# Patient Record
Sex: MALE | Race: WHITE | NOT HISPANIC OR LATINO | Employment: OTHER | URBAN - METROPOLITAN AREA
[De-identification: names, ages, dates, MRNs, and addresses within clinical notes are randomized per-mention and may not be internally consistent; named-entity substitution may affect disease eponyms.]

---

## 2017-01-09 RX ORDER — BUPROPION HYDROCHLORIDE 150 MG/1
TABLET, EXTENDED RELEASE ORAL
Qty: 180 TABLET | Refills: 1 | Status: SHIPPED | OUTPATIENT
Start: 2017-01-09 | End: 2017-05-15

## 2017-01-16 ENCOUNTER — OFFICE VISIT (OUTPATIENT)
Dept: PAIN MEDICINE | Facility: CLINIC | Age: 67
End: 2017-01-16
Payer: OTHER GOVERNMENT

## 2017-01-16 VITALS
WEIGHT: 189.81 LBS | DIASTOLIC BLOOD PRESSURE: 70 MMHG | RESPIRATION RATE: 18 BRPM | SYSTOLIC BLOOD PRESSURE: 130 MMHG | HEART RATE: 74 BPM | BODY MASS INDEX: 26.47 KG/M2 | TEMPERATURE: 98 F

## 2017-01-16 DIAGNOSIS — M25.561 CHRONIC PAIN OF RIGHT KNEE: ICD-10-CM

## 2017-01-16 DIAGNOSIS — G89.29 CHRONIC RIGHT SHOULDER PAIN: Primary | ICD-10-CM

## 2017-01-16 DIAGNOSIS — M25.511 CHRONIC RIGHT SHOULDER PAIN: Primary | ICD-10-CM

## 2017-01-16 DIAGNOSIS — G89.4 CHRONIC PAIN SYNDROME: ICD-10-CM

## 2017-01-16 DIAGNOSIS — G89.29 CHRONIC PAIN OF RIGHT KNEE: ICD-10-CM

## 2017-01-16 PROCEDURE — 99999 PR PBB SHADOW E&M-EST. PATIENT-LVL IV: CPT | Mod: PBBFAC,,, | Performed by: PHYSICIAN ASSISTANT

## 2017-01-16 PROCEDURE — 99213 OFFICE O/P EST LOW 20 MIN: CPT | Mod: S$PBB,,, | Performed by: PHYSICIAN ASSISTANT

## 2017-01-16 PROCEDURE — 99214 OFFICE O/P EST MOD 30 MIN: CPT | Mod: PBBFAC,PO | Performed by: PHYSICIAN ASSISTANT

## 2017-01-16 RX ORDER — BUPRENORPHINE 20 UG/H
1 PATCH, EXTENDED RELEASE TRANSDERMAL
Qty: 4 PATCH | Refills: 0 | Status: SHIPPED | OUTPATIENT
Start: 2017-01-20 | End: 2017-02-17

## 2017-01-16 RX ORDER — BUPRENORPHINE 20 UG/H
1 PATCH, EXTENDED RELEASE TRANSDERMAL
Qty: 4 PATCH | Refills: 0 | Status: SHIPPED | OUTPATIENT
Start: 2017-03-17 | End: 2017-04-13 | Stop reason: SDUPTHER

## 2017-01-16 RX ORDER — METHYLPREDNISOLONE 4 MG/1
TABLET ORAL
Qty: 1 PACKAGE | Refills: 0 | Status: SHIPPED | OUTPATIENT
Start: 2017-01-16 | End: 2017-02-06

## 2017-01-16 RX ORDER — BUPRENORPHINE 20 UG/H
1 PATCH, EXTENDED RELEASE TRANSDERMAL
Qty: 4 PATCH | Refills: 0 | Status: SHIPPED | OUTPATIENT
Start: 2017-02-17 | End: 2017-03-17

## 2017-01-16 NOTE — PROGRESS NOTES
This note was completed with dictation software and grammatical errors may exist.    CC:Knee pain, shoulder pain    HPI: The patient is a 66-year-old man with history of CAD and stent, hypertension, degenerative joint disease in the right shoulder and knee who presents in referral from Dr. Traore for help with pain.  He is status post right knee geniculate nerve block on 11/4/16 with 0% relief so radiofrequency ablation was not scheduled.  He complains of right shoulder greater than right knee pain.  This is worse with use and improved with pain medication and rest.  He denies any major back pain at this time, only reports low back pain from time to time.  He denies weakness, numbness, bladder or bowel incontinence.  He will be moving back to [place] for his job so this will be his last visit.    Pain intervention history:  He is status post right L3 and L4 transforaminal epidural steroid injections on 3/14/14 with 85% relief at first, now reporting what sounds like about 25% relief.  He is status post right L3 and L4 transforaminal epidural steroid injection on 6/4/14 with 0% relief.  He is status post right knee geniculate nerve block on 11/4/16 with 0% relief so radiofrequency ablation was not scheduled.    Urine drug screen on 1/22/14 was positive for buprenorphine and tramadol but negative for hydrocodone which he was apparently taking.    Repeat drug screen on 2/20/14 again positive for buprenorphine and tramadol but negative for hydrocodone.  5/20/14 and 6/25/14 urine drug screens both consistent.    ROS:He reports fatigability, chest pain at times, easy bruising, joint stiffness, joint swelling and back pain.  Balance of review of systems negative.    Medical, surgical, family and social history reviewed elsewhere in record.    Medications/Allergies: See med card    Vitals:    01/16/17 0746   BP: 130/70   Pulse: 74   Resp: 18   Temp: 98.2 °F (36.8 °C)   TempSrc: Oral   Weight: 86.1 kg (189 lb 13.1 oz)    PainSc:   6   PainLoc: Shoulder         Physical exam:  Gen: A and O x3, pleasant, well-groomed  Skin: No rashes or obvious lesions  HEENT: PERRLA  CVS: Regular rate and rhythm, normal S1 and S2, no murmurs.  Resp: Clear to auscultation bilaterally, no wheezes or rales.  Abdomen: Soft, NT/ND, normal bowel sounds present.  Musculoskeletal:  No antalgic gait.     Neuro:  Lower extremities: 5/5 strength bilaterally  Reflexes: Patellar 2+, Achilles 2+ bilaterally  Sensory:  Intact and symmetrical to light touch and pinprick in L2-S1 dermatomes bilaterally.    Right knee: Mild crepitus on range of motion with pain on passive flexion and extension appeared mild tenderness to superior portion of the joint, lateral portion, no redness or erythema or swelling.  Right shoulder: Range of motion is full but painful with flexion, abduction and internal/external rotation.  Empty can test positive.  Moderate generalized tenderness to palpation to the anterior and lateral shoulder.  Mild tenderness to palpation to the right scapular region.    Imagin11 Xray right knee:   THERE IS SPURRING OF THE SUPERIOR PATELLAR FACET AND NARROWING OF THE PATELLOFEMORAL JOINT SPACE. NO JOINT EFFUSION OR ACUTE OSSEOUS ABNORMALITY IS SEEN.    14 MRI lumbar spine  At the T12-L1 level, no significant disk bulge, central canal stenosis, or neural foraminal stenosis is noted. Mild ligamentous hypertrophy is noted  At the L1-L2 level, no significant disk bulge, central canal stenosis, or neural foraminal stenosis is noted. Mild ligamentous hypertrophy is noted   At the L2-L3 level, minimal disk bulging may be noted one to 2 mm. Facet arthropathy and ligamentous hypertrophy is noted. Minimal bilateral neural foraminal narrowing is noted. The central canal is approximately 11 mm and may be minimally narrowed.  At the L3-L4 level, broad-based disk bulging appears to be present of approximately 3 to 4 mm with an asymmetric component greater  towards the right neuroforamen a 4 mm that may relate to protrusion. Facet arthropathy and ligamentous hypertrophy is noted. Mild central canal stenosis is noted to 9 mm. Mild to moderate right neuroforaminal narrowing appears to present with possible contact of the exiting nerve root on the right. Mild left neural foraminal narrowing is noted.  At the L4-L5 level the disk osteophyte protrusion appears to be present paracentric to the right of approximately 5 mm. Mild to moderate central canal narrowing is noted to 8 mm. Mild to moderate right nerve foraminal narrowing is noted with mild left neuroforaminal narrowing. Contact of the exiting right nerve root may be present. Increased T2 signal is noted suggestive of an annular tear paracentric to the right  At the L5-S1 level, ligamentous hypertrophy is noted in. No significant disk bulge, central canal stenosis, or neural foraminal stenosis is noted.      Assessment:  The patient is a 66-year-old man with history of CAD and stent, hypertension, degenerative joint disease in the right shoulder and knee who presents in referral from Dr. Traore for help with pain.   1. Chronic right shoulder pain     2. Chronic pain of right knee     3. Chronic pain syndrome         Plan:  1.  He did not have relief following the geniculate nerve block so RFA will not be scheduled.  2.  His right shoulder pain is getting worse and he will make an appointment with an orthopedic surgeon in [place].  In the meantime, I have provided a Medrol Dosepak to see if this provides some relief.  3.  He continues to take Flexeril occasionally and currently does not need a prescription.  4.  Dr. Giron provided prescriptions for Butrans 20 mcg/h.  I have reviewed the [place] Board of Pharmacy website and there are no abberancies.    5.  He will not follow up because he is moving to [place] but is welcome to return here if needed.

## 2017-02-03 RX ORDER — ATORVASTATIN CALCIUM 40 MG/1
TABLET, FILM COATED ORAL
Qty: 30 TABLET | Refills: 0 | Status: SHIPPED | OUTPATIENT
Start: 2017-02-03 | End: 2017-05-11 | Stop reason: SDUPTHER

## 2017-02-16 ENCOUNTER — TELEPHONE (OUTPATIENT)
Dept: PAIN MEDICINE | Facility: CLINIC | Age: 67
End: 2017-02-16

## 2017-02-16 NOTE — TELEPHONE ENCOUNTER
----- Message from Arin Ayala sent at 2/15/2017  3:05 PM CST -----  Has a question regarding a refill (no further info)  /call wife  Ale [phone]

## 2017-02-16 NOTE — TELEPHONE ENCOUNTER
Spoke with the wife and patient is in [place] for a month. Refill on pain patches are due on Friday and wanted to find out if she can get a refill today. She will be heading out to see him.    Please advise.

## 2017-03-24 RX ORDER — LISINOPRIL 5 MG/1
TABLET ORAL
Qty: 90 TABLET | Refills: 2 | Status: SHIPPED | OUTPATIENT
Start: 2017-03-24 | End: 2017-12-21 | Stop reason: SDUPTHER

## 2017-04-04 ENCOUNTER — TELEPHONE (OUTPATIENT)
Dept: PAIN MEDICINE | Facility: CLINIC | Age: 67
End: 2017-04-04

## 2017-04-04 NOTE — TELEPHONE ENCOUNTER
"----- Message from Rochelle Farooq sent at 4/4/2017  3:12 PM CDT -----  Contact: pt's wife [phone]  Pt's wife called requesting "Jocelyne" to call her back on her cell # [phone]  Wants to discuss apt for her  with either one Dr Giron or Mr Allen upon his arrival.  Pt's wife was requesting 4/17.    Please call her at [phone]    Thanks  liz    "

## 2017-04-07 RX ORDER — RANOLAZINE 500 MG/1
TABLET, FILM COATED, EXTENDED RELEASE ORAL
Qty: 90 TABLET | Refills: 3 | Status: SHIPPED | OUTPATIENT
Start: 2017-04-07 | End: 2018-04-02 | Stop reason: SDUPTHER

## 2017-04-13 ENCOUNTER — TELEPHONE (OUTPATIENT)
Dept: PAIN MEDICINE | Facility: CLINIC | Age: 67
End: 2017-04-13

## 2017-04-13 RX ORDER — BUPRENORPHINE 20 UG/H
1 PATCH, EXTENDED RELEASE TRANSDERMAL
Qty: 4 PATCH | Refills: 0 | Status: SHIPPED | OUTPATIENT
Start: 2017-05-12 | End: 2017-06-09

## 2017-04-13 RX ORDER — BUPRENORPHINE 20 UG/H
1 PATCH, EXTENDED RELEASE TRANSDERMAL
Qty: 4 PATCH | Refills: 0 | Status: SHIPPED | OUTPATIENT
Start: 2017-04-14 | End: 2017-05-12

## 2017-04-13 RX ORDER — BUPRENORPHINE 20 UG/H
1 PATCH, EXTENDED RELEASE TRANSDERMAL
Qty: 4 PATCH | Refills: 0 | Status: SHIPPED | OUTPATIENT
Start: 2017-06-09 | End: 2017-05-15 | Stop reason: SDUPTHER

## 2017-04-13 RX ORDER — BUPRENORPHINE 20 UG/H
1 PATCH, EXTENDED RELEASE TRANSDERMAL
Qty: 4 PATCH | Refills: 0 | Status: SHIPPED | OUTPATIENT
Start: 2017-04-14 | End: 2017-04-13 | Stop reason: SDUPTHER

## 2017-04-17 ENCOUNTER — TELEPHONE (OUTPATIENT)
Dept: PAIN MEDICINE | Facility: CLINIC | Age: 67
End: 2017-04-17

## 2017-04-17 ENCOUNTER — OFFICE VISIT (OUTPATIENT)
Dept: PAIN MEDICINE | Facility: CLINIC | Age: 67
End: 2017-04-17
Payer: OTHER GOVERNMENT

## 2017-04-17 ENCOUNTER — HOSPITAL ENCOUNTER (OUTPATIENT)
Dept: RADIOLOGY | Facility: HOSPITAL | Age: 67
Discharge: HOME | End: 2017-04-17
Attending: ANESTHESIOLOGY
Payer: OTHER GOVERNMENT

## 2017-04-17 VITALS
BODY MASS INDEX: 26.58 KG/M2 | RESPIRATION RATE: 18 BRPM | WEIGHT: 190.63 LBS | TEMPERATURE: 98 F | HEART RATE: 72 BPM | SYSTOLIC BLOOD PRESSURE: 110 MMHG | DIASTOLIC BLOOD PRESSURE: 70 MMHG

## 2017-04-17 DIAGNOSIS — G89.4 CHRONIC PAIN SYNDROME: Primary | ICD-10-CM

## 2017-04-17 DIAGNOSIS — M25.561 CHRONIC PAIN OF RIGHT KNEE: ICD-10-CM

## 2017-04-17 DIAGNOSIS — G89.29 CHRONIC PAIN OF RIGHT KNEE: ICD-10-CM

## 2017-04-17 DIAGNOSIS — G89.29 CHRONIC RIGHT SHOULDER PAIN: ICD-10-CM

## 2017-04-17 DIAGNOSIS — M25.511 CHRONIC RIGHT SHOULDER PAIN: ICD-10-CM

## 2017-04-17 DIAGNOSIS — M25.511 STERNOCLAVICULAR JOINT PAIN, RIGHT: ICD-10-CM

## 2017-04-17 DIAGNOSIS — Z79.891 OPIOID CONTRACT EXISTS: ICD-10-CM

## 2017-04-17 PROCEDURE — 99999 PR PBB SHADOW E&M-EST. PATIENT-LVL IV: CPT | Mod: PBBFAC,,, | Performed by: PHYSICIAN ASSISTANT

## 2017-04-17 PROCEDURE — 71130 X-RAY STRENOCLAVIC JT 3/>VWS: CPT | Mod: TC,PO

## 2017-04-17 PROCEDURE — 99214 OFFICE O/P EST MOD 30 MIN: CPT | Mod: S$PBB,,, | Performed by: PHYSICIAN ASSISTANT

## 2017-04-17 PROCEDURE — 71130 X-RAY STRENOCLAVIC JT 3/>VWS: CPT | Mod: 26,,, | Performed by: RADIOLOGY

## 2017-04-17 PROCEDURE — 99214 OFFICE O/P EST MOD 30 MIN: CPT | Mod: PBBFAC,PO | Performed by: PHYSICIAN ASSISTANT

## 2017-04-17 NOTE — TELEPHONE ENCOUNTER
Patient came in this morning and on his way out wanted to inquire to see if Dr. Giron had any recommendations on providers in the [place] area that he can contact to take over his case. He has contacted several but is looking for someone with  experience. Please advise.

## 2017-04-18 NOTE — PROGRESS NOTES
This note was completed with dictation software and grammatical errors may exist.    CC:Knee pain, shoulder pain    HPI: The patient is a 66-year-old man with history of CAD and stent, hypertension, degenerative joint disease in the right shoulder and knee who presents in referral from Dr. Traore for help with pain.  He returns in follow-up today with multiple pain complaints.  He has been working in [place] and currently lives there but has not established care with a new pain management physician there to take over his medication.  He continues to have right shoulder and right knee pain, denies any changes to this.  He does have a new pain located in the right sternoclavicular joint.  This is been present for about 6 months and he describes it as searing.  It is a little swollen as compared to the left.    Pain intervention history:  He is status post right L3 and L4 transforaminal epidural steroid injections on 3/14/14 with 85% relief at first, now reporting what sounds like about 25% relief.  He is status post right L3 and L4 transforaminal epidural steroid injection on 6/4/14 with 0% relief.  He is status post right knee geniculate nerve block on 11/4/16 with 0% relief so radiofrequency ablation was not scheduled.    Urine drug screen on 1/22/14 was positive for buprenorphine and tramadol but negative for hydrocodone which he was apparently taking.    Repeat drug screen on 2/20/14 again positive for buprenorphine and tramadol but negative for hydrocodone.  5/20/14 and 6/25/14 urine drug screens both consistent.    ROS:He reports fatigability, chest pain at times, easy bruising, joint stiffness, joint swelling and back pain.  Balance of review of systems negative.    Medical, surgical, family and social history reviewed elsewhere in record.    Medications/Allergies: See med card    Vitals:    04/17/17 0817   BP: 110/70   Pulse: 72   Resp: 18   Temp: 98.2 °F (36.8 °C)   TempSrc: Oral   Weight: 86.5 kg (190 lb 9.6  oz)   PainSc:   4   PainLoc: Shoulder         Physical exam:  Gen: A and O x3, pleasant, well-groomed  Skin: No rashes or obvious lesions  HEENT: PERRLA  CVS: Regular rate and rhythm, normal S1 and S2, no murmurs.  Resp: Clear to auscultation bilaterally, no wheezes or rales.  Abdomen: Soft, NT/ND, normal bowel sounds present.  Musculoskeletal:  No antalgic gait.  Mild swelling of the right sternoclavicular joint with moderate tenderness to palpation.    Neuro:  Lower extremities: 5/5 strength bilaterally  Reflexes: Patellar 2+, Achilles 2+ bilaterally  Sensory:  Intact and symmetrical to light touch and pinprick in L2-S1 dermatomes bilaterally.    Right knee: Mild crepitus on range of motion with pain on passive flexion and extension appeared mild tenderness to superior portion of the joint, lateral portion, no redness or erythema or swelling.  Right shoulder: Range of motion is full but painful with flexion, abduction and internal/external rotation.  Empty can test positive.  Moderate generalized tenderness to palpation to the anterior and lateral shoulder.  Mild tenderness to palpation to the right scapular region.    Imagin11 Xray right knee:   THERE IS SPURRING OF THE SUPERIOR PATELLAR FACET AND NARROWING OF THE PATELLOFEMORAL JOINT SPACE. NO JOINT EFFUSION OR ACUTE OSSEOUS ABNORMALITY IS SEEN.    14 MRI lumbar spine  At the T12-L1 level, no significant disk bulge, central canal stenosis, or neural foraminal stenosis is noted. Mild ligamentous hypertrophy is noted  At the L1-L2 level, no significant disk bulge, central canal stenosis, or neural foraminal stenosis is noted. Mild ligamentous hypertrophy is noted   At the L2-L3 level, minimal disk bulging may be noted one to 2 mm. Facet arthropathy and ligamentous hypertrophy is noted. Minimal bilateral neural foraminal narrowing is noted. The central canal is approximately 11 mm and may be minimally narrowed.  At the L3-L4 level, broad-based disk  bulging appears to be present of approximately 3 to 4 mm with an asymmetric component greater towards the right neuroforamen a 4 mm that may relate to protrusion. Facet arthropathy and ligamentous hypertrophy is noted. Mild central canal stenosis is noted to 9 mm. Mild to moderate right neuroforaminal narrowing appears to present with possible contact of the exiting nerve root on the right. Mild left neural foraminal narrowing is noted.  At the L4-L5 level the disk osteophyte protrusion appears to be present paracentric to the right of approximately 5 mm. Mild to moderate central canal narrowing is noted to 8 mm. Mild to moderate right nerve foraminal narrowing is noted with mild left neuroforaminal narrowing. Contact of the exiting right nerve root may be present. Increased T2 signal is noted suggestive of an annular tear paracentric to the right  At the L5-S1 level, ligamentous hypertrophy is noted in. No significant disk bulge, central canal stenosis, or neural foraminal stenosis is noted.      Assessment:  The patient is a 66-year-old man with history of CAD and stent, hypertension, degenerative joint disease in the right shoulder and knee who presents in referral from Dr. Traore for help with pain.   1. Chronic pain syndrome     2. Sternoclavicular joint pain, right  X-Ray Sternoclavicular Joints Min 3 View   3. Chronic right shoulder pain     4. Chronic pain of right knee     5. Opioid contract exists         Plan:  1.  I have ordered an x-ray of the sternoclavicular joints for further evaluation.  2.  Dr. Giron provided prescriptions for Butrans 20 mcg/h.  I have reviewed the [place] Board of Pharmacy website and there are no abberancies.    3.  We will make a 3 month follow-up but he may establish care with a physician in [place] in the future.

## 2017-05-11 RX ORDER — ATORVASTATIN CALCIUM 40 MG/1
40 TABLET, FILM COATED ORAL DAILY
Qty: 30 TABLET | Refills: 0 | Status: SHIPPED | OUTPATIENT
Start: 2017-05-11 | End: 2017-05-15 | Stop reason: SDUPTHER

## 2017-05-11 NOTE — TELEPHONE ENCOUNTER
----- Message from Gaye Ayala sent at 5/11/2017  2:16 PM CDT -----  Contact: pt   Pt needs a refill on lipitor ,,, [facility], but [facility] need a local prescription sent  to [facility] for a 30 days supply until the mail order come,, please call pt back [phone] or [phone]

## 2017-05-11 NOTE — TELEPHONE ENCOUNTER
wife informed OV needed, working in [place], will be in on the 15th, OV scheduled , wife informed, will you do 30 day supply for now?

## 2017-05-12 ENCOUNTER — TELEPHONE (OUTPATIENT)
Dept: PAIN MEDICINE | Facility: CLINIC | Age: 67
End: 2017-05-12

## 2017-05-12 ENCOUNTER — PATIENT OUTREACH (OUTPATIENT)
Dept: ADMINISTRATIVE | Facility: HOSPITAL | Age: 67
End: 2017-05-12
Payer: OTHER GOVERNMENT

## 2017-05-12 DIAGNOSIS — I10 ESSENTIAL HYPERTENSION: ICD-10-CM

## 2017-05-12 DIAGNOSIS — Z11.59 NEED FOR HEPATITIS C SCREENING TEST: Primary | ICD-10-CM

## 2017-05-12 DIAGNOSIS — E78.5 HYPERLIPIDEMIA WITH TARGET LDL LESS THAN 100: ICD-10-CM

## 2017-05-12 NOTE — TELEPHONE ENCOUNTER
----- Message from Kelle Figueroa sent at 5/11/2017  3:24 PM CDT -----  Wife Ale called asking for a call back regarding the pt/pls call back at [phone]

## 2017-05-12 NOTE — TELEPHONE ENCOUNTER
Patient was told that patient could not get medication filled today. Spoke with pharmacy and they stated patient's medication is ready for . Start date is today. Informed patient's wife of this information.

## 2017-05-15 ENCOUNTER — TELEPHONE (OUTPATIENT)
Dept: PAIN MEDICINE | Facility: CLINIC | Age: 67
End: 2017-05-15

## 2017-05-15 ENCOUNTER — OFFICE VISIT (OUTPATIENT)
Dept: FAMILY MEDICINE | Facility: CLINIC | Age: 67
End: 2017-05-15
Payer: OTHER GOVERNMENT

## 2017-05-15 VITALS
WEIGHT: 185.31 LBS | SYSTOLIC BLOOD PRESSURE: 121 MMHG | HEIGHT: 70 IN | HEART RATE: 74 BPM | BODY MASS INDEX: 26.53 KG/M2 | DIASTOLIC BLOOD PRESSURE: 68 MMHG

## 2017-05-15 DIAGNOSIS — Z00.00 ROUTINE HISTORY AND PHYSICAL EXAMINATION OF ADULT: Primary | ICD-10-CM

## 2017-05-15 DIAGNOSIS — Z11.59 NEED FOR HEPATITIS C SCREENING TEST: ICD-10-CM

## 2017-05-15 DIAGNOSIS — M65.341 TRIGGER RING FINGER OF RIGHT HAND: ICD-10-CM

## 2017-05-15 DIAGNOSIS — F32.5 DEPRESSION, MAJOR, IN REMISSION: ICD-10-CM

## 2017-05-15 DIAGNOSIS — I10 ESSENTIAL HYPERTENSION: ICD-10-CM

## 2017-05-15 DIAGNOSIS — E78.5 HYPERLIPIDEMIA WITH TARGET LDL LESS THAN 100: ICD-10-CM

## 2017-05-15 DIAGNOSIS — I25.10 CAD IN NATIVE ARTERY: ICD-10-CM

## 2017-05-15 PROCEDURE — 99999 PR PBB SHADOW E&M-EST. PATIENT-LVL III: CPT | Mod: PBBFAC,,, | Performed by: FAMILY MEDICINE

## 2017-05-15 PROCEDURE — 99213 OFFICE O/P EST LOW 20 MIN: CPT | Mod: PBBFAC,PO | Performed by: FAMILY MEDICINE

## 2017-05-15 PROCEDURE — 99396 PREV VISIT EST AGE 40-64: CPT | Mod: S$PBB,,, | Performed by: FAMILY MEDICINE

## 2017-05-15 RX ORDER — ATORVASTATIN CALCIUM 40 MG/1
40 TABLET, FILM COATED ORAL DAILY
Qty: 90 TABLET | Refills: 3 | Status: SHIPPED | OUTPATIENT
Start: 2017-05-15 | End: 2018-05-11 | Stop reason: SDUPTHER

## 2017-05-15 RX ORDER — BUPROPION HYDROCHLORIDE 300 MG/1
300 TABLET ORAL DAILY
Qty: 90 TABLET | Refills: 3 | Status: SHIPPED | OUTPATIENT
Start: 2017-05-15 | End: 2018-04-23 | Stop reason: SDUPTHER

## 2017-05-15 NOTE — MR AVS SNAPSHOT
[facility]  [id number] [address] [facility] [address]  Phone: [phone]  Fax: [phone]                  Luigi Murillo   5/15/2017 11:20 AM   Office Visit    Description:  Male : 1950   Provider:  Sharath Traore MD   Department:  [facility]           Reason for Visit     Annual Exam           Diagnoses this Visit        Comments    Routine history and physical examination of adult    -  Primary     Essential hypertension         Hyperlipidemia with target LDL less than 100         CAD in native artery         Trigger ring finger of right hand         Need for hepatitis C screening test         Depression, major, in remission                To Do List           Future Appointments        Provider Department Dept Phone    2017 11:05 AM LABORATORY, [place] [facility] [phone]    2017 8:00 AM Vaughn Boyd MD [facility] [phone]    2017 3:00 PM Marcus Evans OD [facility] - Ophthalmology [phone]    2017 8:00 AM [facility]ST[facility] [place] - Pain Management [phone]      Goals (5 Years of Data)     None       These Medications        Disp Refills Start End    atorvastatin (LIPITOR) 40 MG tablet 90 tablet 3 5/15/2017     Take 1 tablet (40 mg total) by mouth once daily. - Oral    Ph[facility]acy: [facility] - [address] Ph #: [phone]       buPROPion (WELLBUTRIN XL) 300 MG 24 hr tablet 90 tablet 3 5/15/2017     Take 1 tablet (300 mg total) by mouth once daily. - Oral    Pharmacy: [facility] - [address] Ph #: [phone]         [facility]     [place] On Call Nurse Care Line - 24/ Assistance  Unless otherwise directed by your provider, please contact [facility], our nurse care line that is available for 24/7 assistance.     Registered nurses in the [facility]  provide: appointment scheduling, clinical advisement, health education, and other advisory services.  Call: [phone] (toll free)               Medications           Message regarding Medications     Verify the changes and/or additions to your medication regime listed below are the same as discussed with your clinician today.  If any of these changes or additions are incorrect, please notify your healthcare provider.        START taking these NEW medications        Refills    buPROPion (WELLBUTRIN XL) 300 MG 24 hr tablet 3    Sig: Take 1 tablet (300 mg total) by mouth once daily.    Class: Normal    Route: Oral      STOP taking these medications     buPROPion (WELLBUTRIN SR) 150 MG TBSR 12 hr tablet TAKE 1 TABLET TWICE A DAY           Verify that the below list of medications is an accurate representation of the medications you are currently taking.  If none reported, the list may be blank. If incorrect, please contact your healthcare provider. Carry this list with you in case of emergency.           Current Medications     aspirin (ECOTRIN) 81 MG EC tablet Take 81 mg by mouth once daily.     atorvastatin (LIPITOR) 40 MG tablet Take 1 tablet (40 mg total) by mouth once daily.    BUTRANS 20 mcg/hour PTWK Apply 1 patch topically every 7 days.    cyclobenzaprine (FLEXERIL) 10 MG tablet Take 1 tablet (10 mg total) by mouth nightly as needed for Muscle spasms.    diphenhydrAMINE (BENADRYL) 25 mg capsule Take 25 mg by mouth every 6 (six) hours as needed for Itching.    lisinopril (PRINIVIL,ZESTRIL) 5 MG tablet TAKE 1 TABLET DAILY    metoprolol succinate (TOPROL-XL) 25 MG 24 hr tablet Take 1 tablet (25 mg total) by mouth once daily.    naproxen (NAPROSYN) 500 MG tablet Take 1 tablet (500 mg total) by mouth 2 (two) times daily as needed.    RANEXA 500 mg Tb12 TAKE 1 TABLET NIGHTLY    tamsulosin (FLOMAX) 0.4 mg Cp24 TAKE 1 CAPSULE EVERY EVENING    buPROPion (WELLBUTRIN XL) 300 MG 24 hr tablet Take 1 tablet (300 mg  "total) by mouth once daily.    nitroGLYCERIN (NITROSTAT) 0.4 MG SL tablet Place 1 tablet (0.4 mg total) under the tongue every 5 (five) minutes as needed for Chest pain. Don't take more than 3 doses.           Clinical Reference Information           Your Vitals Were     BP Pulse Height Weight BMI    121/68 74 5' 10" (1.778 m) 84.1 kg (185 lb 4.8 oz) 26.59 kg/m2      Blood Pressure          Most Recent Value    BP  121/68      Allergies as of 5/15/2017     Acetaminophen-codeine    Codeine    Latex, Natural Rubber      Immunizations Administered on Date of Encounter - 5/15/2017     None      Orders Placed During Today's Visit      Normal Orders This Visit    Ambulatory referral to Orthopedics     Case request GI: COLONOSCOPY     Future Labs/Procedures Expected by Expires    Comprehensive metabolic panel  5/15/2017 5/15/2018    Hepatitis C antibody  5/15/2017 7/14/2018    Lipid panel  5/15/2017 (Approximate) 5/15/2018    PSA, Screening  5/15/2017 5/15/2018      Language Assistance Services     ATTENTION: Language assistance services are available, free of charge. Please call [phone].      ATENCIÓN: Si habla español, tiene a torres disposición servicios gratuitos de asistencia lingüística. Llame al [phone].     FAUZIA Ý: N?u b?n nói Ti?ng Vi?t, có các d?ch v? h? tr? ngôn ng? mi?n phí dành cho b?n. G?i s? [phone].         [facility] complies with applicable Federal civil rights laws and does not discriminate on the basis of race, color, national origin, age, disability, or sex.        "

## 2017-05-15 NOTE — TELEPHONE ENCOUNTER
----- Message from Jocelyne Bach sent at 5/12/2017 11:13 AM CDT -----  Contact: wife Ale [phone]  Please call her regarding the Butrans Patches.  He needs to change it one day prior to when the pharmacy will fill it.  She called in yesterday with no response.  Thank you!

## 2017-05-16 NOTE — PROGRESS NOTES
Patient presents for physical exam and follow-up medical problems as below to include coronary disease hypertension hyperlipidemia..  States compliance with medication.  No complaint of side effects.  He does complain of triggering of the right ring finger present for couple months.  Due for colonoscopy.  No chest pain or shortness of breath.  Depression controlled.  Would like to continue medication    Past Medical History:  Past Medical History:   Diagnosis Date    Anticoagulant long-term use     ASA 81mg, effient    BPH (benign prostatic hypertrophy) with urinary obstruction     CAD (coronary artery disease)     2 stents    Chronic pain     DDD (degenerative disc disease), lumbar     Depression     DJD (degenerative joint disease) of knee     Hyperlipidemia LDL goal < 100     Hypertension     MRSA cellulitis     S/P coronary artery stent placement May and Yarely 2011    x2     Past Surgical History:   Procedure Laterality Date    CORONARY ANGIOPLASTY      CORONARY STENT PLACEMENT      x 2    Epidural steroid injection      Pain management    INJURY      KNEE SURGERY      Right, x2    MANDIBLE FRACTURE SURGERY      accident during  service    ROTATOR CUFF REPAIR      right times 2    VASECTOMY       Social History     Social History    Marital status:      Spouse name: N/A    Number of children: N/A    Years of education: N/A     Occupational History    Not on file.     Social History Main Topics    Smoking status: Former Smoker     Packs/day: 1.00     Years: 25.00     Quit date: 5/11/2007    Smokeless tobacco: Never Used    Alcohol use No    Drug use: Yes     Special: Hydrocodone    Sexual activity: Not on file     Other Topics Concern    Not on file     Social History Narrative     Family History   Problem Relation Age of Onset    Heart attack Father     Heart disease Mother     Emphysema Mother     Cancer Mother      unknown    Prostate cancer Brother     Diabetes  Paternal Aunt      Review of patient's allergies indicates:   Allergen Reactions    Acetaminophen-codeine Rash     #3    Codeine Nausea Only    Latex, natural rubber Rash     Current Outpatient Prescriptions on File Prior to Visit   Medication Sig Dispense Refill    aspirin (ECOTRIN) 81 MG EC tablet Take 81 mg by mouth once daily.       BUTRANS 20 mcg/hour PTWK Apply 1 patch topically every 7 days. 4 patch 0    cyclobenzaprine (FLEXERIL) 10 MG tablet Take 1 tablet (10 mg total) by mouth nightly as needed for Muscle spasms. 90 tablet 2    diphenhydrAMINE (BENADRYL) 25 mg capsule Take 25 mg by mouth every 6 (six) hours as needed for Itching.      lisinopril (PRINIVIL,ZESTRIL) 5 MG tablet TAKE 1 TABLET DAILY 90 tablet 2    metoprolol succinate (TOPROL-XL) 25 MG 24 hr tablet Take 1 tablet (25 mg total) by mouth once daily. (Patient taking differently: Take 25 mg by mouth once daily. 12.5 mg) 90 tablet 3    naproxen (NAPROSYN) 500 MG tablet Take 1 tablet (500 mg total) by mouth 2 (two) times daily as needed. 30 tablet 0    RANEXA 500 mg Tb12 TAKE 1 TABLET NIGHTLY 90 tablet 3    tamsulosin (FLOMAX) 0.4 mg Cp24 TAKE 1 CAPSULE EVERY EVENING 90 capsule 3    [DISCONTINUED] atorvastatin (LIPITOR) 40 MG tablet Take 1 tablet (40 mg total) by mouth once daily. 30 tablet 0    [DISCONTINUED] buPROPion (WELLBUTRIN SR) 150 MG TBSR 12 hr tablet TAKE 1 TABLET TWICE A  tablet 1    nitroGLYCERIN (NITROSTAT) 0.4 MG SL tablet Place 1 tablet (0.4 mg total) under the tongue every 5 (five) minutes as needed for Chest pain. Don't take more than 3 doses. 25 tablet 6    [DISCONTINUED] BUTRANS 20 mcg/hour PTWK Apply 1 patch topically every 7 days. 4 patch 0     No current facility-administered medications on file prior to visit.              ROS:  GENERAL: No fever, chills,  or significant weight changes.  HEENT: No headache, vision or hearing complaints.  No dysphagia  CHEST: Denies ESCAMILLA, cyanosis, wheezing, cough and  "sputum production.  CARDIOVASCULAR: Denies chest pain, PND, orthopnea or reduced exercise tolerance.  ABDOMEN: Appetite fine. Denies diarrhea, abdominal pain, hematemesis or blood in stool.  URINARY: No flank pain, dysuria or hematuria.  MUSCULOSKELETAL: No warmth swelling or tenderness of the joints  NEUROLOGIC: No focal weakness numbness or paresthesia  PSYCHIATRIC: Denies depression      OBJECTIVE:   Vitals:    05/15/17 1137   BP: 121/68   Pulse: 74   Weight: 84.1 kg (185 lb 4.8 oz)   Height: 5' 10" (1.778 m)       Wt Readings from Last 3 Encounters:   05/15/17 84.1 kg (185 lb 4.8 oz)   04/17/17 86.5 kg (190 lb 9.6 oz)   01/16/17 86.1 kg (189 lb 13.1 oz)       APPEARANCE: Well nourished, well developed, in no acute distress.    HEAD: Normocephalic.  Atraumatic.  No sinus tenderness.  EYES:   Right eye: Pupil reactive.  Conjunctiva clear.    Left eye: Pupil reactive.  Conjunctiva clear.    Both fundi:  Grossly normal to nondilated exam. EOMI.    EARS: TM's intact. Light reflex normal. No retraction or perforation.    NOSE:  clear.  MOUTH & THROAT:  No pharyngeal erythema or exudate. No lesions.  NECK: Supple. No bruits.  No JVD.  No cervical lymphadenopathy.  No thyromegaly.    CHEST: Breath sounds clear bilaterally.  Normal respiratory effort  CARDIOVASCULAR: Normal rate.  Regular rhythm.  No murmurs.  No rub.  No gallops.  ABDOMEN: Bowel sounds normal.  Soft.  No tenderness.  No organomegaly.  PERIPHERAL VASCULAR: No cyanosis.  No clubbing.  No edema.  NEUROLOGIC: No focal findings.  MENTAL STATUS: Alert.  Oriented x 3.      Luigi was seen today for annual exam.    Diagnoses and all orders for this visit:    Routine history and physical examination of adult  -     Comprehensive metabolic panel; Future  -     Lipid panel; Future  -     PSA, Screening; Future  -     Case request GI: COLONOSCOPY    Essential hypertension  -     Comprehensive metabolic panel; Future    Hyperlipidemia with target LDL less than " 100  -     Lipid panel; Future    CAD in native artery    Trigger ring finger of right hand  -     Ambulatory referral to Orthopedics    Need for hepatitis C screening test  -     Hepatitis C antibody; Future    Depression, major, in remission    Other orders  -     atorvastatin (LIPITOR) 40 MG tablet; Take 1 tablet (40 mg total) by mouth once daily.  -     buPROPion (WELLBUTRIN XL) 300 MG 24 hr tablet; Take 1 tablet (300 mg total) by mouth once daily.        Continue current medication with refills as above.   Anticipatory guidance: Don't smoke.  Healthy diet and regular exercise recommended.

## 2017-05-29 ENCOUNTER — LAB VISIT (OUTPATIENT)
Dept: LAB | Facility: HOSPITAL | Age: 67
End: 2017-05-29
Attending: FAMILY MEDICINE
Payer: OTHER GOVERNMENT

## 2017-05-29 DIAGNOSIS — E78.5 HYPERLIPIDEMIA WITH TARGET LDL LESS THAN 100: ICD-10-CM

## 2017-05-29 DIAGNOSIS — I10 ESSENTIAL HYPERTENSION: ICD-10-CM

## 2017-05-29 DIAGNOSIS — Z00.00 ROUTINE HISTORY AND PHYSICAL EXAMINATION OF ADULT: ICD-10-CM

## 2017-05-29 DIAGNOSIS — Z11.59 NEED FOR HEPATITIS C SCREENING TEST: ICD-10-CM

## 2017-05-29 LAB
ALBUMIN SERPL BCP-MCNC: 3.8 G/DL
ALP SERPL-CCNC: 81 U/L
ALT SERPL W/O P-5'-P-CCNC: 14 U/L
ANION GAP SERPL CALC-SCNC: 7 MMOL/L
AST SERPL-CCNC: 16 U/L
BILIRUB SERPL-MCNC: 0.9 MG/DL
BUN SERPL-MCNC: 14 MG/DL
CALCIUM SERPL-MCNC: 9.2 MG/DL
CHLORIDE SERPL-SCNC: 105 MMOL/L
CHOLEST/HDLC SERPL: 3 {RATIO}
CO2 SERPL-SCNC: 27 MMOL/L
COMPLEXED PSA SERPL-MCNC: 0.62 NG/ML
CREAT SERPL-MCNC: 1.4 MG/DL
EST. GFR  (AFRICAN AMERICAN): >60 ML/MIN/1.73 M^2
EST. GFR  (NON AFRICAN AMERICAN): 52.7 ML/MIN/1.73 M^2
GLUCOSE SERPL-MCNC: 98 MG/DL
HDL/CHOLESTEROL RATIO: 33.1 %
HDLC SERPL-MCNC: 151 MG/DL
HDLC SERPL-MCNC: 50 MG/DL
LDLC SERPL CALC-MCNC: 84 MG/DL
NONHDLC SERPL-MCNC: 101 MG/DL
POTASSIUM SERPL-SCNC: 4.7 MMOL/L
PROT SERPL-MCNC: 7 G/DL
SODIUM SERPL-SCNC: 139 MMOL/L
TRIGL SERPL-MCNC: 85 MG/DL

## 2017-05-29 PROCEDURE — 36415 COLL VENOUS BLD VENIPUNCTURE: CPT | Mod: PO

## 2017-05-29 PROCEDURE — 80061 LIPID PANEL: CPT

## 2017-05-29 PROCEDURE — 80053 COMPREHEN METABOLIC PANEL: CPT

## 2017-05-29 PROCEDURE — 86803 HEPATITIS C AB TEST: CPT

## 2017-05-29 PROCEDURE — 84153 ASSAY OF PSA TOTAL: CPT

## 2017-05-30 LAB — HCV AB SERPL QL IA: NEGATIVE

## 2017-06-22 DIAGNOSIS — M79.646 PAIN OF FINGER, UNSPECIFIED LATERALITY: Primary | ICD-10-CM

## 2017-07-07 ENCOUNTER — TELEPHONE (OUTPATIENT)
Dept: FAMILY MEDICINE | Facility: CLINIC | Age: 67
End: 2017-07-07

## 2017-07-07 NOTE — TELEPHONE ENCOUNTER
This patient had an appointment with orthopedic dr. Vaughn pulido. He rescheduled with Dr Roberto Gonzales for 7/17/17. Do I need to put in a new referral for Dr. Gonzales?

## 2017-07-14 DIAGNOSIS — M79.641 PAIN OF RIGHT HAND: Primary | ICD-10-CM

## 2017-07-17 ENCOUNTER — OFFICE VISIT (OUTPATIENT)
Dept: PAIN MEDICINE | Facility: CLINIC | Age: 67
End: 2017-07-17
Payer: OTHER GOVERNMENT

## 2017-07-17 ENCOUNTER — HOSPITAL ENCOUNTER (OUTPATIENT)
Dept: RADIOLOGY | Facility: HOSPITAL | Age: 67
Discharge: HOME | End: 2017-07-17
Attending: ORTHOPAEDIC SURGERY
Payer: OTHER GOVERNMENT

## 2017-07-17 ENCOUNTER — OFFICE VISIT (OUTPATIENT)
Dept: ORTHOPEDICS | Facility: CLINIC | Age: 67
End: 2017-07-17
Payer: OTHER GOVERNMENT

## 2017-07-17 VITALS
SYSTOLIC BLOOD PRESSURE: 130 MMHG | BODY MASS INDEX: 26.9 KG/M2 | HEART RATE: 78 BPM | TEMPERATURE: 98 F | WEIGHT: 187.5 LBS | DIASTOLIC BLOOD PRESSURE: 70 MMHG

## 2017-07-17 VITALS — HEART RATE: 75 BPM | RESPIRATION RATE: 12 BRPM | DIASTOLIC BLOOD PRESSURE: 82 MMHG | SYSTOLIC BLOOD PRESSURE: 133 MMHG

## 2017-07-17 DIAGNOSIS — G89.29 CHRONIC PAIN OF RIGHT KNEE: ICD-10-CM

## 2017-07-17 DIAGNOSIS — G89.29 CHRONIC RIGHT SHOULDER PAIN: Primary | ICD-10-CM

## 2017-07-17 DIAGNOSIS — M79.641 PAIN OF RIGHT HAND: ICD-10-CM

## 2017-07-17 DIAGNOSIS — Z79.891 OPIOID CONTRACT EXISTS: ICD-10-CM

## 2017-07-17 DIAGNOSIS — M25.561 CHRONIC PAIN OF RIGHT KNEE: ICD-10-CM

## 2017-07-17 DIAGNOSIS — R25.3 TWITCHING: ICD-10-CM

## 2017-07-17 DIAGNOSIS — M65.341 TRIGGER FINGER, RIGHT RING FINGER: Primary | ICD-10-CM

## 2017-07-17 DIAGNOSIS — M25.511 STERNOCLAVICULAR JOINT PAIN, RIGHT: ICD-10-CM

## 2017-07-17 DIAGNOSIS — M79.18 MYOFASCIAL PAIN: ICD-10-CM

## 2017-07-17 DIAGNOSIS — M25.511 CHRONIC RIGHT SHOULDER PAIN: Primary | ICD-10-CM

## 2017-07-17 PROCEDURE — 73130 X-RAY EXAM OF HAND: CPT | Mod: TC,RT

## 2017-07-17 PROCEDURE — 99215 OFFICE O/P EST HI 40 MIN: CPT | Mod: PBBFAC,PO | Performed by: PHYSICIAN ASSISTANT

## 2017-07-17 PROCEDURE — 99213 OFFICE O/P EST LOW 20 MIN: CPT | Mod: PBBFAC,25,27 | Performed by: ORTHOPAEDIC SURGERY

## 2017-07-17 PROCEDURE — 99999 PR PBB SHADOW E&M-EST. PATIENT-LVL V: CPT | Mod: PBBFAC,,, | Performed by: PHYSICIAN ASSISTANT

## 2017-07-17 PROCEDURE — 99999 PR PBB SHADOW E&M-EST. PATIENT-LVL III: CPT | Mod: PBBFAC,,, | Performed by: ORTHOPAEDIC SURGERY

## 2017-07-17 PROCEDURE — 99204 OFFICE O/P NEW MOD 45 MIN: CPT | Mod: S$PBB,,, | Performed by: ORTHOPAEDIC SURGERY

## 2017-07-17 PROCEDURE — 73130 X-RAY EXAM OF HAND: CPT | Mod: 26,RT,, | Performed by: RADIOLOGY

## 2017-07-17 PROCEDURE — 99214 OFFICE O/P EST MOD 30 MIN: CPT | Mod: S$PBB,,, | Performed by: PHYSICIAN ASSISTANT

## 2017-07-17 RX ORDER — MELOXICAM 15 MG/1
15 TABLET ORAL DAILY
Qty: 30 TABLET | Refills: 2 | Status: SHIPPED | OUTPATIENT
Start: 2017-07-17 | End: 2017-08-21

## 2017-07-17 RX ORDER — BUPRENORPHINE 20 UG/H
1 PATCH, EXTENDED RELEASE TRANSDERMAL
Qty: 4 PATCH | Refills: 0 | Status: SHIPPED | OUTPATIENT
Start: 2017-09-27 | End: 2017-10-23 | Stop reason: SDUPTHER

## 2017-07-17 RX ORDER — BUPRENORPHINE 20 UG/H
1 PATCH, EXTENDED RELEASE TRANSDERMAL
Qty: 4 PATCH | Refills: 0 | Status: ON HOLD | OUTPATIENT
Start: 2017-08-30 | End: 2017-09-14 | Stop reason: HOSPADM

## 2017-07-17 RX ORDER — BUPRENORPHINE 20 UG/H
1 PATCH, EXTENDED RELEASE TRANSDERMAL
Qty: 4 PATCH | Refills: 0 | Status: SHIPPED | OUTPATIENT
Start: 2017-08-02 | End: 2017-08-30

## 2017-07-17 NOTE — PROGRESS NOTES
Subjective:      Patient ID: Luigi Murillo is a 66 y.o. male.    Chief Complaint: Finger Pain of the Right Hand    HPI: The patient presents with classic triggering of his right ring finger.  The patient is right-hand dominant.  He denies any injury.  He has been having progressively worsening and painful symptoms over the last several months.    Review of Systems   Constitution: Negative for chills, decreased appetite, weight gain and weight loss.   HENT: Negative for congestion, ear pain, hearing loss and sore throat.    Eyes: Negative for blurred vision, double vision, vision loss in left eye, vision loss in right eye and visual disturbance.   Cardiovascular: Negative for chest pain, irregular heartbeat, leg swelling and palpitations.   Respiratory: Negative for cough and shortness of breath.    Endocrine: Negative for cold intolerance and heat intolerance.   Hematologic/Lymphatic: Negative for adenopathy. Does not bruise/bleed easily.   Skin: Negative for color change, nail changes, rash and suspicious lesions.   Musculoskeletal:        Positive triggering of right ring finger   Gastrointestinal: Negative for abdominal pain, constipation, diarrhea, heartburn, nausea and vomiting.   Genitourinary: Negative for bladder incontinence and dysuria.   Neurological: Negative for dizziness, paresthesias, sensory change and tremors.   Psychiatric/Behavioral: Negative for altered mental status, depression, memory loss and substance abuse.   Allergic/Immunologic: Negative for hives and persistent infections.         Objective:            General    Nursing note and vitals reviewed.  Constitutional: He is oriented to person, place, and time. He appears well-developed and well-nourished.   HENT:   Head: Normocephalic.   Nose: Nose normal.   Eyes: EOM are normal. Pupils are equal, round, and reactive to light.   Neck: Normal range of motion. Neck supple.   Cardiovascular: Normal rate and regular rhythm.    Pulmonary/Chest:  Effort normal.   Abdominal: Soft. He exhibits no distension. There is no tenderness.   Neurological: He is alert and oriented to person, place, and time.   Psychiatric: He has a normal mood and affect. His behavior is normal.     General Musculoskeletal Exam   Gait: normal   Pelvic Obliquity: none    Right Ankle/Foot Exam   Right ankle exam is normal.    Range of Motion   The patient has normal right ankle ROM.    Alignment   Forefoot Alignment: normal    Muscle Strength   The patient has normal right ankle strength.    Tests   Anterior drawer: negative  Varus tilt: negative  Heel Walk: able to perform  Tiptoe Walk: able to perform    Other   Sensation: normal  Peroneal Subluxation: negative    Left Ankle/Foot Exam   Left ankle exam is normal.    Range of Motion   The patient has normal left ankle ROM.     Alignment   Forefoot Alignment: normal    Muscle Strength   The patient has normal left ankle strength.    Tests   Anterior drawer: negative  Varus tilt: negative  Heel Walk: able to perform  Tiptoe Walk: able to perform    Other   Sensation: normal  Peroneal Subluxation: negative    Right Knee Exam   Right knee exam is normal.    Inspection   Erythema: absent  Swelling: absent  Effusion: effusion  Deformity: deformity  Bruising: absent    Range of Motion   The patient has normal right knee ROM.    Tests   Meniscus   Goran:  Medial - negative Lateral - negative  Ligament Examination Lachman: normal (-1 to 2mm) PCL-Posterior Drawer: normal (0 to 2mm)     MCL - Valgus: normal (0 to 2mm)  LCL - Varus: normalPivot Shift: normal (Equal)  Posterolateral Corner: unstable (>15 degrees difference)  Patella   Patellar Apprehension: negative  Passive Patellar Tilt: neutral  Patellar Tracking: normal  Patellar Grind: negative    Other   Sensation: normal    Left Knee Exam   Left knee exam is normal.    Inspection   Erythema: absent  Swelling: absent  Effusion: absent  Deformity: deformity  Bruising: absent    Range of  Motion   The patient has normal left knee ROM.    Tests   Meniscus   Goran:  Medial - negative Lateral - negative  Stability Lachman: normal (-1 to 2mm) PCL-Posterior Drawer: normal (0 to 2mm)  MCL - Valgus: normal (0 to 2mm)  LCL - Varus: normal (0 to 2mm)Pivot Shift: normal (Equal)  Posterolateral Corner: unstable (>15 degrees difference)  Patella   Patellar Apprehension: negative  Passive Patellar Tilt: neutral  Patellar Tracking: normal  Patellar Grind: negative    Other   Sensation: normal    Right Hip Exam   Right hip exam is normal.     Inspection   Swelling: absent  Bruising: absent    Muscle Strength   The patient has normal right hip strength.    Other   Sensation: normal  Left Hip Exam   Left hip exam is normal.    Inspection   Swelling: absent  Bruising: absent    Range of Motion   The patient has normal left hip ROM.    Muscle Strength   The patient has normal left hip strength.     Other   Sensation: normal      Back (L-Spine & T-Spine) / Neck (C-Spine) Exam   Back exam is normal.    Neck (C-Spine) Range of Motion   Flexion:     Normal  Extension: Normal  Right Lateral Bend: normal  Left Lateral Bend: normal  Right Rotation: normal  Left Rotation: normal    Spinal Sensation   Right Side Sensation  C-Spine Level: normal   L-Spine Level: normal  S-Spine Level: normal  T-Spine Level: normal  Left Side Sensation  C-Spine Level: normal  L-Spine Level: normal  S-Spine Level: normal  T-Spine Level: normal    Other He has no scoliosis .  Head Tilt:  Negative      Right Hand/Wrist Exam     Inspection   Deformity: Wrist - deformity     Pain   Hand - The patient exhibits pain of the ring MCP.    Swelling   Hand - The patient is swollen on the ring MCP.    Tenderness   The patient is tender to palpation of the watson area.    Range of Motion   The patient has normal right hand/wrist ROM.    Tests   Phalens Sign: negative  Tinels Sign (Medial Nerve): negative  Finkelstein: negative  Cubital Tunnel Compression  Test: negative      Other     Neuorologic Exam    Median Distribution: normal  Ulnar Distribution: normal  Radial Distribution: normal    Comments:  Positive triggering of right ring finger, with pain over the A1 pulley      Left Hand/Wrist Exam   Left hand exam is normal.    Inspection   Deformity: Wrist - absent     Range of Motion   The patient has normal left hand/wrist ROM.    Tests   Phalens Sign: negative  Tinels Sign (Medial Nerve): negative  Finkelstein: negative  Cubital Tunnel Compression Test: negative      Other     Sensory Exam  Median Distribution: normal  Ulnar Distribution: normal  Radial Distribution: normal      Right Elbow Exam   Right elbow exam is normal.    Inspection   Effusion: absent  Bruising: absent  Deformity: absent    Range of Motion   The patient has normal right elbow ROM.    Tests Tinel's Sign (cubital tunnel): negative    Other   Sensation: normal      Left Elbow Exam   Left elbow exam is normal.    Inspection   Effusion: absent  Bruising: absent  Deformity: absent    Range of Motion   The patient has normal left elbow ROM.    Tests Tinel's Sign (cubital tunnel): negative    Other   Sensation: normal    Right Shoulder Exam   Right shoulder exam is normal.    Tenderness   The patient is tender to palpation of the greater tuberosity.    Range of Motion   Active Abduction: normal   Passive Abduction: normal   Extension: normal   Forward Flexion: normal   Forward Elevation: normal  Adduction: normal  External Rotation 0 degrees: normal   Internal Rotation 0 degrees: normal     Muscle Strength   The patient has normal right shoulder strength.    Tests & Signs   Apprehension: negative  Cross Arm: negative  Impingement: negative    Other   Sensation: normal    Left Shoulder Exam   Left shoulder exam is normal.    Range of Motion   Active Abduction: normal   Passive Abduction: normal   Extension: normal   Forward Flexion: normal   Forward Elevation: normal  Adduction: normal  External  Rotation 0 degrees: normal   Internal Rotation 0 degrees: normal     Muscle Strength   The patient has normal left shoulder strength.    Tests & Signs   Apprehension: negative  Cross Arm: negative  Impingement: negative    Other   Sensation: normal       Muscle Strength   Right Upper Extremity   Wrist Extension:    Wrist Flexion:    :    Elbow Pronation:     Elbow Supination:     Elbow Extension:   Elbow Flexion:   Intrinsics:   Left Upper Extremity  Wrist Extension:    Wrist Flexion:    :     Elbow Pronation:     Elbow Supination:     Elbow Extension:   Elbow Flexion:   Intrinsics:   Right Lower Extremity   Hip Abduction:    Quadriceps:     Hamstrin/5   Left Lower Extremity   Hip Abduction:    Quadriceps:     Hamstrin/5     Reflexes     Left Side  Biceps:  2+  Triceps:  2+  Quadriceps:  2+  Achilles:  2+    Right Side   Biceps:  2+  Triceps:  2+  Quadriceps:  2+  Achilles:  2+    Vascular Exam     Right Pulses  Dorsalis Pedis:      2+  Posterior Tibial:      2+  Radial:                    2+      Left Pulses  Dorsalis Pedis:      2+  Posterior Tibial:      2+  Radial:                    2+      Capillary Refill  Right Hand: normal capillary refill  Left Hand: normal capillary refill                X-RAY: Hand x-rays are negative for acute fracture or bony destructive lesion.    Assessment:           Encounter Diagnosis   Name Primary?    Trigger finger, right ring finger Yes          Plan:     The patient is not a particularly good candidate for a local steroid injection due to his essentially locked triggering of the right ring finger.  He has been scheduled for an outpatient trigger finger release on 2017.  The patient understands the material risks of surgery, what is involved, and desires to proceed.

## 2017-07-17 NOTE — PROGRESS NOTES
This note was completed with dictation software and grammatical errors may exist.    CC:Knee pain, shoulder pain    HPI: The patient is a 66-year-old man with history of CAD and stent, hypertension, degenerative joint disease in the right shoulder and knee who presents in referral from Dr. Traore for help with pain.  He returns in follow-up today with multiple pain complaints.  He denies any changes to his chronic right knee or right shoulder pain but has a newer pain in the right neck.  He feels that this is connected to his right shoulder.  He also continues to have the pain that he mentioned to me during last visit and has right sternoclavicular joint.  He also feels that when his right shoulder bothers him more, this does as well.  He is going to see an orthopedic surgeon in [place] for right fourth digit trigger finger.  He also has another complaint of a random twitching.  He is not able to pinpoint a pattern of when this happens but it is starting to concern him.  He states this may have been going on for several months now.  He denies any major back pain at this time.    Pain intervention history:  He is status post right L3 and L4 transforaminal epidural steroid injections on 3/14/14 with 85% relief at first, now reporting what sounds like about 25% relief.  He is status post right L3 and L4 transforaminal epidural steroid injection on 6/4/14 with 0% relief.  He is status post right knee geniculate nerve block on 11/4/16 with 0% relief so radiofrequency ablation was not scheduled.    Urine drug screen on 1/22/14 was positive for buprenorphine and tramadol but negative for hydrocodone which he was apparently taking.    Repeat drug screen on 2/20/14 again positive for buprenorphine and tramadol but negative for hydrocodone.  5/20/14 and 6/25/14 urine drug screens both consistent.    ROS:He reports fatigability, chest pain at times, easy bruising, joint stiffness, joint swelling and back pain.  Balance of  review of systems negative.    Medical, surgical, family and social history reviewed elsewhere in record.    Medications/Allergies: See med card    Vitals:    17 0752   BP: 130/70   Pulse: 78   Temp: 98.2 °F (36.8 °C)   TempSrc: Oral   Weight: 85 kg (187 lb 8 oz)   PainSc:   4   PainLoc: Back         Physical exam:  Gen: A and O x3, pleasant, well-groomed  Skin: No rashes or obvious lesions  HEENT: PERRLA  CVS: Regular rate and rhythm, normal S1 and S2, no murmurs.  Resp: Clear to auscultation bilaterally, no wheezes or rales.  Abdomen: Soft, NT/ND, normal bowel sounds present.  Musculoskeletal:  No antalgic gait.  Mild swelling of the right sternoclavicular joint with moderate tenderness to palpation.    Neuro:  Upper extremities: 5/5 strength bilaterally   Lower extremities: 5/5 strength bilaterally  Reflexes: Brachioradialis 2+, Bicep 2+, Tricep 2+. Patellar 2+, Achilles 2+ bilaterally  Sensory: Intact and symmetrical to light touch and pinprick in C2-T1 dermatomes bilaterally.  Intact and symmetrical to light touch and pinprick in L2-S1 dermatomes bilaterally.    Cervical Spine:  Cervical spine: ROM is full in flexion, extension and lateral rotation without increased pain.  Spurling's maneuver causes no neck pain to either side.  Myofascial exam: Mild tenderness to palpation to the right cervical paraspinous muscles and right trapezius muscle.    Right knee: Mild crepitus on range of motion with pain on passive flexion and extension appeared mild tenderness to superior portion of the joint, lateral portion, no redness or erythema or swelling.  Right shoulder: Range of motion is full but painful with flexion, abduction and internal/external rotation.  Empty can test positive.  Moderate generalized tenderness to palpation to the anterior and lateral shoulder.  Mild tenderness to palpation to the right scapular region.    Imagin11 Xray right knee:   THERE IS SPURRING OF THE SUPERIOR PATELLAR FACET  AND NARROWING OF THE PATELLOFEMORAL JOINT SPACE. NO JOINT EFFUSION OR ACUTE OSSEOUS ABNORMALITY IS SEEN.    1/22/14 MRI lumbar spine  At the T12-L1 level, no significant disk bulge, central canal stenosis, or neural foraminal stenosis is noted. Mild ligamentous hypertrophy is noted  At the L1-L2 level, no significant disk bulge, central canal stenosis, or neural foraminal stenosis is noted. Mild ligamentous hypertrophy is noted   At the L2-L3 level, minimal disk bulging may be noted one to 2 mm. Facet arthropathy and ligamentous hypertrophy is noted. Minimal bilateral neural foraminal narrowing is noted. The central canal is approximately 11 mm and may be minimally narrowed.  At the L3-L4 level, broad-based disk bulging appears to be present of approximately 3 to 4 mm with an asymmetric component greater towards the right neuroforamen a 4 mm that may relate to protrusion. Facet arthropathy and ligamentous hypertrophy is noted. Mild central canal stenosis is noted to 9 mm. Mild to moderate right neuroforaminal narrowing appears to present with possible contact of the exiting nerve root on the right. Mild left neural foraminal narrowing is noted.  At the L4-L5 level the disk osteophyte protrusion appears to be present paracentric to the right of approximately 5 mm. Mild to moderate central canal narrowing is noted to 8 mm. Mild to moderate right nerve foraminal narrowing is noted with mild left neuroforaminal narrowing. Contact of the exiting right nerve root may be present. Increased T2 signal is noted suggestive of an annular tear paracentric to the right  At the L5-S1 level, ligamentous hypertrophy is noted in. No significant disk bulge, central canal stenosis, or neural foraminal stenosis is noted.      Assessment:  The patient is a 66-year-old man with history of CAD and stent, hypertension, degenerative joint disease in the right shoulder and knee who presents in referral from Dr. Traore for help with pain.   1.  Chronic right shoulder pain  Ambulatory Referral to Physical Medicine Rehab   2. Sternoclavicular joint pain, right  Ambulatory Referral to Physical Medicine Rehab   3. Myofascial pain  Ambulatory Referral to Physical Medicine Rehab   4. Twitching  Ambulatory consult to Neurology   5. Chronic pain of right knee     6. Opioid contract exists         Plan:  1.  Dr. Giron provided prescriptions for Butrans 20 µg per hour.  I have reviewed the [place] Board of Pharmacy website and there are no abberancies.    2.  I discussed his right shoulder, right neck and right sternoclavicular joint symptoms with him.  His right neck pain may be facet mediated but he feels like it is connected to his shoulder.  I will have him see Dr. Faust for further evaluation.  Consideration can be made for cervical medial branch blocks in the future if necessary but I would obtain cervical spine x-rays first.  3.  In regards to the twitching that he is describing, I have placed a referral to neurology for further evaluation.  4.  Follow-up in 3 months or sooner as needed.

## 2017-07-17 NOTE — LETTER
July 17, 2017      Sharath Traore MD  [address] [facility] [address]           [facility]  [address] [place] [id number]  Phone: [phone]  Fax: [phone]          Patient: Luigi Murillo   MR Number: [id number]   YOB: 1950   Date of Visit: 7/17/2017       Dear Dr. Sharath Traore:    Thank you for referring Luigi Murillo to me for evaluation. Attached you will find relevant portions of my assessment and plan of care.    If you have questions, please do not hesitate to call me. I look forward to following Luigi Murillo along with you.    Sincerely,    Roberto Gonzales MD    Enclosure  CC:  No Recipients    If you would like to receive this communication electronically, please contact [email or web address] or [phone] to request more information on [employer] Link access.    For providers and/or their staff who would like to refer a patient to [facility], please contact us through our one-stop-shop provider referral line, [facility], at [phone].    If you feel you have received this communication in error or would no longer like to receive these types of communications, please e-mail [email or web address]

## 2017-07-25 DIAGNOSIS — M65.341 TRIGGER RING FINGER OF RIGHT HAND: Primary | ICD-10-CM

## 2017-07-25 DIAGNOSIS — Z01.818 PREOP TESTING: Primary | ICD-10-CM

## 2017-08-07 ENCOUNTER — CLINICAL SUPPORT (OUTPATIENT)
Dept: CARDIOLOGY | Facility: CLINIC | Age: 67
End: 2017-08-07
Payer: OTHER GOVERNMENT

## 2017-08-07 ENCOUNTER — HOSPITAL ENCOUNTER (OUTPATIENT)
Dept: RADIOLOGY | Facility: HOSPITAL | Age: 67
Discharge: HOME | End: 2017-08-07
Attending: ORTHOPAEDIC SURGERY
Payer: OTHER GOVERNMENT

## 2017-08-07 DIAGNOSIS — Z01.818 PREOP TESTING: ICD-10-CM

## 2017-08-07 PROCEDURE — 71020 XR CHEST PA AND LATERAL PRE-OP: CPT | Mod: 26,,, | Performed by: RADIOLOGY

## 2017-08-07 PROCEDURE — 93005 ELECTROCARDIOGRAM TRACING: CPT | Mod: PBBFAC | Performed by: NUCLEAR MEDICINE

## 2017-08-07 PROCEDURE — 71020 XR CHEST PA AND LATERAL PRE-OP: CPT | Mod: TC

## 2017-08-07 PROCEDURE — 93010 ELECTROCARDIOGRAM REPORT: CPT | Mod: S$PBB,,, | Performed by: NUCLEAR MEDICINE

## 2017-08-09 ENCOUNTER — TELEPHONE (OUTPATIENT)
Dept: FAMILY MEDICINE | Facility: CLINIC | Age: 67
End: 2017-08-09

## 2017-08-09 NOTE — TELEPHONE ENCOUNTER
Per wife, has appt Monday for surgery clearance, wants to know why he needs to come in as you saw him 5/15/17 and did the surgery referral, please advise.

## 2017-08-09 NOTE — TELEPHONE ENCOUNTER
If this is for the orthopedic surgery for the trigger finger that we had looked at before, then Unless something has changed I probably don't need to see him and he is clear for surgery.    If he is having any kind of chest pain or shortness of breath  we would need to see him.  We would also need to see him if his surgeon or anesthesiologist requires us to see him before the surgery .

## 2017-08-09 NOTE — TELEPHONE ENCOUNTER
----- Message from Gaye Ayala sent at 8/9/2017  1:24 PM CDT -----  Contact: pt wife Ale  Pt calling and wants to know why he has to come to the appt on Monday,,, please call pt wife back at [phone]

## 2017-08-21 ENCOUNTER — OFFICE VISIT (OUTPATIENT)
Dept: FAMILY MEDICINE | Facility: CLINIC | Age: 67
End: 2017-08-21
Payer: OTHER GOVERNMENT

## 2017-08-21 VITALS
SYSTOLIC BLOOD PRESSURE: 121 MMHG | TEMPERATURE: 98 F | HEART RATE: 88 BPM | WEIGHT: 182 LBS | BODY MASS INDEX: 25.48 KG/M2 | HEIGHT: 71 IN | DIASTOLIC BLOOD PRESSURE: 79 MMHG

## 2017-08-21 DIAGNOSIS — M65.341 TRIGGER FINGER, RIGHT RING FINGER: ICD-10-CM

## 2017-08-21 DIAGNOSIS — E78.5 HYPERLIPIDEMIA WITH TARGET LDL LESS THAN 100: ICD-10-CM

## 2017-08-21 DIAGNOSIS — N13.8 BENIGN PROSTATIC HYPERPLASIA WITH URINARY OBSTRUCTION: ICD-10-CM

## 2017-08-21 DIAGNOSIS — N40.1 BENIGN PROSTATIC HYPERPLASIA WITH URINARY OBSTRUCTION: ICD-10-CM

## 2017-08-21 DIAGNOSIS — G89.4 CHRONIC PAIN SYNDROME: ICD-10-CM

## 2017-08-21 DIAGNOSIS — F32.5 DEPRESSION, MAJOR, IN REMISSION: ICD-10-CM

## 2017-08-21 DIAGNOSIS — I10 ESSENTIAL HYPERTENSION: ICD-10-CM

## 2017-08-21 DIAGNOSIS — Z01.818 PRE-OPERATIVE EXAMINATION: Primary | ICD-10-CM

## 2017-08-21 DIAGNOSIS — I25.10 CAD IN NATIVE ARTERY: ICD-10-CM

## 2017-08-21 DIAGNOSIS — I20.9 AP (ANGINA PECTORIS): ICD-10-CM

## 2017-08-21 PROCEDURE — 99999 PR PBB SHADOW E&M-EST. PATIENT-LVL III: CPT | Mod: PBBFAC,,, | Performed by: FAMILY MEDICINE

## 2017-08-21 PROCEDURE — 99244 OFF/OP CNSLTJ NEW/EST MOD 40: CPT | Mod: S$PBB,,, | Performed by: FAMILY MEDICINE

## 2017-08-21 PROCEDURE — 99213 OFFICE O/P EST LOW 20 MIN: CPT | Mod: PBBFAC,PO | Performed by: FAMILY MEDICINE

## 2017-08-21 RX ORDER — METOPROLOL SUCCINATE 25 MG/1
12.5 TABLET, EXTENDED RELEASE ORAL DAILY
Qty: 45 TABLET | Refills: 3 | Status: SHIPPED | OUTPATIENT
Start: 2017-08-21 | End: 2017-12-21 | Stop reason: SDUPTHER

## 2017-08-21 NOTE — Clinical Note
Hilton, Patient is not clear for surgery due to some recent chest pains.  We'll have him see cardiology for clearance. Thanks

## 2017-08-21 NOTE — PROGRESS NOTES
Patient presents for preoperative clearance exam for trigger finger release to be performed by Dr. Gonzales in September.  Medical problems as per below past medical history to include coronary disease, hypertension, hyperlipidemia.  He has actually had some intermittent chest pains during the past several months with some increased symptoms last night.  Symptoms seem to be both with exertion or without exertion.  Does get some cramping sensation at the left lower chest.  The longest episode was yesterday at 30 minutes.  He has had some symptoms similar to prior to previous coronary stents.  He has not seen cardiology in over 2 years.    Past Medical History:  Past Medical History:   Diagnosis Date    Anticoagulant long-term use     ASA 81mg, effient    BPH (benign prostatic hypertrophy) with urinary obstruction     CAD (coronary artery disease)     2 stents    Chronic pain     DDD (degenerative disc disease), lumbar     Depression     DJD (degenerative joint disease) of knee     Hyperlipidemia LDL goal < 100     Hypertension     MRSA cellulitis     S/P coronary artery stent placement May and Yarely 2011    x2     Past Surgical History:   Procedure Laterality Date    CORONARY ANGIOPLASTY      CORONARY STENT PLACEMENT      x 2    Epidural steroid injection      Pain management    INJURY      KNEE SURGERY      Right, x2    MANDIBLE FRACTURE SURGERY      accident during  service    ROTATOR CUFF REPAIR      right times 2    VASECTOMY       Social History     Social History    Marital status:      Spouse name: N/A    Number of children: N/A    Years of education: N/A     Occupational History    Not on file.     Social History Main Topics    Smoking status: Former Smoker     Packs/day: 1.00     Years: 25.00     Quit date: 5/11/2007    Smokeless tobacco: Never Used    Alcohol use No    Drug use:      Types: Hydrocodone    Sexual activity: Not on file     Other Topics Concern    Not on  file     Social History Narrative    No narrative on file     Family History   Problem Relation Age of Onset    Heart attack Father     Heart disease Mother     Emphysema Mother     Cancer Mother      unknown    Prostate cancer Brother     Diabetes Paternal Aunt      Review of patient's allergies indicates:   Allergen Reactions    Acetaminophen-codeine Rash     #3    Codeine Nausea Only    Latex, natural rubber Rash     Current Outpatient Prescriptions on File Prior to Visit   Medication Sig Dispense Refill    aspirin (ECOTRIN) 81 MG EC tablet Take 81 mg by mouth once daily.       atorvastatin (LIPITOR) 40 MG tablet Take 1 tablet (40 mg total) by mouth once daily. 90 tablet 3    buPROPion (WELLBUTRIN XL) 300 MG 24 hr tablet Take 1 tablet (300 mg total) by mouth once daily. 90 tablet 3    BUTRANS 20 mcg/hour PTWK Apply 1 patch topically every 7 days. 4 patch 0    [START ON 8/30/2017] BUTRANS 20 mcg/hour PTWK Apply 1 patch topically every 7 days. 4 patch 0    [START ON 9/27/2017] BUTRANS 20 mcg/hour PTWK Apply 1 patch topically every 7 days. 4 patch 0    cyclobenzaprine (FLEXERIL) 10 MG tablet Take 1 tablet (10 mg total) by mouth nightly as needed for Muscle spasms. 90 tablet 2    lisinopril (PRINIVIL,ZESTRIL) 5 MG tablet TAKE 1 TABLET DAILY 90 tablet 2    RANEXA 500 mg Tb12 TAKE 1 TABLET NIGHTLY 90 tablet 3    tamsulosin (FLOMAX) 0.4 mg Cp24 TAKE 1 CAPSULE EVERY EVENING 90 capsule 3    [DISCONTINUED] metoprolol succinate (TOPROL-XL) 25 MG 24 hr tablet Take 1 tablet (25 mg total) by mouth once daily. (Patient taking differently: Take 25 mg by mouth once daily. 12.5 mg) 90 tablet 3    [DISCONTINUED] diphenhydrAMINE (BENADRYL) 25 mg capsule Take 25 mg by mouth every 6 (six) hours as needed for Itching.      [DISCONTINUED] meloxicam (MOBIC) 15 MG tablet Take 1 tablet (15 mg total) by mouth once daily. 30 tablet 2    [DISCONTINUED] nitroGLYCERIN (NITROSTAT) 0.4 MG SL tablet Place 1 tablet (0.4 mg  "total) under the tongue every 5 (five) minutes as needed for Chest pain. Don't take more than 3 doses. 25 tablet 6     No current facility-administered medications on file prior to visit.            ROS:  GENERAL: No fever, chills,  or significant weight changes.   CARDIOVASCULAR: Positive chest pain as above  ABDOMEN: Appetite fine. Denies diarrhea, abdominal pain, hematemesis or blood in stool.  URINARY: No flank pain, dysuria or hematuria.      OBJECTIVE:     Vitals:    08/21/17 0805   BP: 121/79   Pulse: 88   Temp: 98.2 °F (36.8 °C)   Weight: 82.6 kg (182 lb)   Height: 5' 11" (1.803 m)     Wt Readings from Last 3 Encounters:   08/21/17 82.6 kg (182 lb)   07/17/17 85 kg (187 lb 8 oz)   05/15/17 84.1 kg (185 lb 4.8 oz)     APPEARANCE: Well nourished, well developed, in no acute distress.    HEAD: Normocephalic.  Atraumatic.  No sinus tenderness.  EYES:   Right eye: Pupil reactive.  Conjunctiva clear.    Left eye: Pupil reactive.  Conjunctiva clear.    Both fundi:  Grossly normal to nondilated exam. EOMI.    EARS: TM's intact. Light reflex normal. No retraction or perforation.    NOSE:  clear.  MOUTH & THROAT:  No pharyngeal erythema or exudate. No lesions.  NECK: Supple. No bruits.  No JVD.  No cervical lymphadenopathy.  No thyromegaly.    CHEST: Breath sounds clear bilaterally.  Normal respiratory effort  CARDIOVASCULAR: Normal rate.  Regular rhythm.  No murmurs.  No rub.  No gallops.  ABDOMEN: Bowel sounds normal.  Soft.  No tenderness.  No organomegaly.  PERIPHERAL VASCULAR: No cyanosis.  No clubbing.  No edema.  NEUROLOGIC: No focal findings.  MENTAL STATUS: Alert.  Oriented x 3.    Reviewed recent laboratory and EKG.    Luigi was seen today for post-op problem, chest pain and cramping.    Diagnoses and all orders for this visit:    Pre-operative examination    Trigger finger, right ring finger    CAD in native artery  -     Ambulatory referral to Cardiology  -     metoprolol succinate (TOPROL-XL) 25 MG 24 hr " tablet; Take 0.5 tablets (12.5 mg total) by mouth once daily.    AP (angina pectoris)  -     Ambulatory referral to Cardiology    Essential hypertension    Hyperlipidemia with target LDL less than 100    Chronic pain syndrome    Depression, major, in remission    Benign prostatic hyperplasia with urinary obstruction        Due to the patient's recent angina type symptoms he is NOT clear for planned surgery.  We'll have him see cardiology for clearance.  ER precautions if persistent prolonged symptoms.

## 2017-08-22 ENCOUNTER — TELEPHONE (OUTPATIENT)
Dept: FAMILY MEDICINE | Facility: CLINIC | Age: 67
End: 2017-08-22

## 2017-08-22 NOTE — TELEPHONE ENCOUNTER
Wife reports has appt with Dr Joyce, in [place], on 8/25/17.  Referral sent to preservice to authorize

## 2017-08-22 NOTE — TELEPHONE ENCOUNTER
----- Message from Gaye Ayala sent at 8/22/2017  1:27 PM CDT -----  Contact: pt wife  Pt wife calling about the referral to cardiologist,, please call back [phone]

## 2017-08-25 ENCOUNTER — OFFICE VISIT (OUTPATIENT)
Dept: CARDIOLOGY | Facility: CLINIC | Age: 67
End: 2017-08-25
Payer: OTHER GOVERNMENT

## 2017-08-25 VITALS
HEIGHT: 70 IN | BODY MASS INDEX: 26.67 KG/M2 | WEIGHT: 186.31 LBS | SYSTOLIC BLOOD PRESSURE: 137 MMHG | HEART RATE: 88 BPM | DIASTOLIC BLOOD PRESSURE: 82 MMHG

## 2017-08-25 DIAGNOSIS — I10 ESSENTIAL HYPERTENSION: ICD-10-CM

## 2017-08-25 DIAGNOSIS — E78.5 HYPERLIPIDEMIA WITH TARGET LDL LESS THAN 100: ICD-10-CM

## 2017-08-25 DIAGNOSIS — Z95.5 S/P CORONARY ARTERY STENT PLACEMENT: Primary | ICD-10-CM

## 2017-08-25 DIAGNOSIS — Z01.810 PREOP CARDIOVASCULAR EXAM: ICD-10-CM

## 2017-08-25 DIAGNOSIS — Z95.5 STATUS POST CORONARY ARTERY STENT PLACEMENT: ICD-10-CM

## 2017-08-25 PROCEDURE — 3079F DIAST BP 80-89 MM HG: CPT | Mod: ,,, | Performed by: INTERNAL MEDICINE

## 2017-08-25 PROCEDURE — 3075F SYST BP GE 130 - 139MM HG: CPT | Mod: ,,, | Performed by: INTERNAL MEDICINE

## 2017-08-25 PROCEDURE — 99999 PR PBB SHADOW E&M-EST. PATIENT-LVL II: CPT | Mod: PBBFAC,,, | Performed by: INTERNAL MEDICINE

## 2017-08-25 PROCEDURE — 99214 OFFICE O/P EST MOD 30 MIN: CPT | Mod: S$PBB,,, | Performed by: INTERNAL MEDICINE

## 2017-08-25 PROCEDURE — 3008F BODY MASS INDEX DOCD: CPT | Mod: ,,, | Performed by: INTERNAL MEDICINE

## 2017-08-25 PROCEDURE — 99212 OFFICE O/P EST SF 10 MIN: CPT | Mod: PBBFAC,PO | Performed by: INTERNAL MEDICINE

## 2017-08-25 NOTE — PROGRESS NOTES
Subjective:    Patient ID:  Luigi Murillo is a 66 y.o. male who presents for evaluation of Dizziness; Coronary Artery Disease (Stents 2011); Chest Pain; Hyperlipidemia; Hypertension; Shortness of Breath; and Breathing Problem      HPI 65 yo WM with hx of coronary stents in 2011. Since that time he has had two nuclear stress test the last in 2015 for atypical CP that have been normal. He is having hand surgery and needs clearance. Has occasional SOB, dizziness and CP that he  describes as a cramping under left rib cage that occurs at rest. He is very active with no exertional symptoms. Has chronic pain and on pain patches for multiple arthritic symptoms.    Impression: NORMAL MYOCARDIAL PERFUSION  1. The perfusion scan is free of evidence for myocardial ischemia or injury.   2. Resting wall motion is physiologic.   3. Resting LV function is normal.   4. The ventricular volumes are normal at rest and stress.   5. The extracardiac distribution of radioactivity is normal.       This document has been electronically    SIGNED BY: Cristian Jennings MD On: 06/17/2015 21:58    CONCLUSIONS     1 - Concentric hypertrophy.     2 - Normal left ventricular systolic function (EF 60-65%).     3 - Normal left ventricular diastolic function.     4 - Normal right ventricular systolic function .     5 - Trivial to mild mitral regurgitation.     6 - Trivial to mild tricuspid regurgitation.         This document has been electronically    SIGNED BY: Elisa Kuhn MD On: 06/17/2015 22:23  Review of Systems   Constitution: Negative for decreased appetite, fever, weakness, malaise/fatigue, weight gain and weight loss.   HENT: Negative for headaches, hearing loss and nosebleeds.    Eyes: Negative for visual disturbance.   Cardiovascular: Positive for chest pain. Negative for claudication, cyanosis, dyspnea on exertion, irregular heartbeat, leg swelling, near-syncope, orthopnea, palpitations, paroxysmal nocturnal dyspnea and syncope.  "  Respiratory: Positive for shortness of breath. Negative for cough, hemoptysis, sleep disturbances due to breathing, snoring and wheezing.    Endocrine: Negative for cold intolerance, heat intolerance, polydipsia and polyuria.   Hematologic/Lymphatic: Negative for adenopathy and bleeding problem. Does not bruise/bleed easily.   Skin: Negative for color change, itching, poor wound healing, rash and suspicious lesions.   Musculoskeletal: Positive for arthritis, back pain, joint pain, muscle cramps and myalgias. Negative for falls, joint swelling and muscle weakness.   Gastrointestinal: Negative for bloating, abdominal pain, change in bowel habit, constipation, flatus, heartburn, hematemesis, hematochezia, hemorrhoids, jaundice, melena, nausea and vomiting.   Genitourinary: Negative for bladder incontinence, decreased libido, frequency, hematuria, hesitancy and urgency.   Neurological: Positive for light-headedness. Negative for brief paralysis, difficulty with concentration, excessive daytime sleepiness, dizziness, focal weakness, loss of balance, numbness and vertigo.   Psychiatric/Behavioral: Negative for altered mental status, depression and memory loss. The patient does not have insomnia and is not nervous/anxious.    Allergic/Immunologic: Negative for environmental allergies, hives and persistent infections.        Objective:    Physical Exam   Constitutional: He is oriented to person, place, and time. He appears well-developed and well-nourished. No distress.   /82   Pulse 88   Ht 5' 10" (1.778 m)   Wt 84.5 kg (186 lb 4.6 oz)   BMI 26.73 kg/m²      HENT:   Head: Normocephalic and atraumatic.   Eyes: Conjunctivae and lids are normal. Pupils are equal, round, and reactive to light. Right eye exhibits no discharge. No scleral icterus.   Neck: Normal range of motion. Neck supple. No JVD present. No tracheal deviation present. No thyromegaly present.   Cardiovascular: Normal rate, regular rhythm, S1 " normal, S2 normal, normal heart sounds and intact distal pulses.  Exam reveals no gallop and no friction rub.    No murmur heard.  Pulses:       Carotid pulses are 2+ on the right side, and 2+ on the left side.       Radial pulses are 2+ on the right side, and 2+ on the left side.        Femoral pulses are 2+ on the right side, and 2+ on the left side.       Popliteal pulses are 2+ on the right side, and 2+ on the left side.        Dorsalis pedis pulses are 2+ on the right side, and 2+ on the left side.        Posterior tibial pulses are 2+ on the right side, and 2+ on the left side.   Pulmonary/Chest: Effort normal and breath sounds normal. No respiratory distress. He has no wheezes. He has no rales. He exhibits no tenderness.   Abdominal: Soft. Bowel sounds are normal. He exhibits no distension and no mass. There is no hepatosplenomegaly or hepatomegaly. There is no tenderness. There is no rebound and no guarding.   Musculoskeletal: Normal range of motion. He exhibits no edema or tenderness.   Lymphadenopathy:     He has no cervical adenopathy.   Neurological: He is alert and oriented to person, place, and time. He has normal reflexes. No cranial nerve deficit. Coordination normal.   Skin: Skin is warm and dry. No rash noted. He is not diaphoretic. No erythema. No pallor.   Psychiatric: He has a normal mood and affect. His speech is normal and behavior is normal. Judgment and thought content normal. Cognition and memory are normal.         Assessment:       1. S/P coronary artery stent placement    2. Essential hypertension    3. Hyperlipidemia with target LDL less than 100    4. Status post coronary artery stent placement    5. Preop cardiovascular exam         Plan:     Pain and symptoms very atypical for CAD.     Patient cleared for the hand surgery from cardiac standpoint    His wife would like him to have a stress test since he works out of town      Orders Placed This Encounter   Procedures    Exercise  stress echo     Return in about 6 months (around 2/25/2018).

## 2017-08-25 NOTE — LETTER
August 25, 2017      Sharath Traore MD  [address] [facility] [address]           [place] [facility] [address] [facility] [address] [place] [address]  Phone: [phone]          Patient: Luigi Murillo   MR Number: [id number]   YOB: 1950   Date of Visit: 8/25/2017       Dear Dr. Sharaht Traore:    Thank you for referring Luigi Murillo to me for evaluation. Attached you will find relevant portions of my assessment and plan of care.    If you have questions, please do not hesitate to call me. I look forward to following Luigi Murillo along with you.    Sincerely,    Jamshid Joyce Jr., MD    Enclosure  CC:  No Recipients    If you would like to receive this communication electronically, please contact [email or web address] or [phone] to request more information on [employer] access.    For providers and/or their staff who would like to refer a patient to [facility], please contact us through our one-stop-shop provider referral line, Jonathan Alvarez, at [phone].    If you feel you have received this communication in error or would no longer like to receive these types of communications, please e-mail [email or web address]

## 2017-08-29 ENCOUNTER — CLINICAL SUPPORT (OUTPATIENT)
Dept: CARDIOLOGY | Facility: CLINIC | Age: 67
End: 2017-08-29
Payer: OTHER GOVERNMENT

## 2017-08-29 DIAGNOSIS — E78.5 HYPERLIPIDEMIA WITH TARGET LDL LESS THAN 100: ICD-10-CM

## 2017-08-29 DIAGNOSIS — Z95.5 S/P CORONARY ARTERY STENT PLACEMENT: ICD-10-CM

## 2017-08-29 DIAGNOSIS — I10 ESSENTIAL HYPERTENSION: ICD-10-CM

## 2017-08-29 DIAGNOSIS — Z95.5 STATUS POST CORONARY ARTERY STENT PLACEMENT: ICD-10-CM

## 2017-08-29 LAB
DIASTOLIC DYSFUNCTION: NO
ESTIMATED PA SYSTOLIC PRESSURE: 33.03
RETIRED EF AND QEF - SEE NOTES: 60 (ref 55–65)
TRICUSPID VALVE REGURGITATION: NORMAL

## 2017-08-29 PROCEDURE — 93351 STRESS TTE COMPLETE: CPT | Mod: PBBFAC,PO | Performed by: INTERNAL MEDICINE

## 2017-08-29 PROCEDURE — 93321 DOPPLER ECHO F-UP/LMTD STD: CPT | Mod: 26,S$PBB,, | Performed by: INTERNAL MEDICINE

## 2017-08-30 ENCOUNTER — TELEPHONE (OUTPATIENT)
Dept: CARDIOLOGY | Facility: CLINIC | Age: 67
End: 2017-08-30

## 2017-08-30 NOTE — TELEPHONE ENCOUNTER
Test results discussed with patient. There are no absolute contraindications to surgery from cardiac standpoint and would use routine precautions. No further cardiac testing required prior to surgery.

## 2017-09-11 NOTE — PRE-PROCEDURE INSTRUCTIONS
Pre op instructions reviewed with patient per phone:    To confirm, Your surgeon has instructed you:  Surgery is scheduled 9/14/17 at 0930.      Please report to [facility] 1st floor main lobby by 0800 Pre admit office will call afternoon prior to surgery for final arrival time.      INSTRUCTIONS IMPORTANT!!!  ¨ Do not eat, drink, or smoke after 12 midnight-including water. OK to brush teeth, no gum, candy or mints!    ¨ Take only these medicines with a small swallow of water-morning of surgery.  Lisinopril    ____  Do not wear makeup, including mascara.  ____  No powder, lotions or creams to surgical area.  ____  Please remove all jewelry, including piercings and leave at home.  ____  No money or valuables needed. Please leave at home.  ____  Please bring identification and insurance information to hospital.  ____  If going home the same day, arrange for a ride home. You will not be able to   drive if Anesthesia was used.  ____  Children, under 12 years old, must remain in the waiting room with an adult.  They are not allowed in patient areas.  ____  Wear loose fitting clothing. Allow for dressings, bandages.  ____  Stop Aspirin, Ibuprofen, Motrin and Aleve at least 5-7 days before surgery, unless otherwise instructed by your doctor, or the nurse.   You MAY use Tylenol/acetaminophen until day of surgery.  ____  If you take diabetic medication, do not take am of surgery unless instructed by   Doctor.  ____ Stop taking any Fish Oil supplement or any Vitamins that contain Vitamin E at least 5 days prior to surgery.          Bathing Instructions-- The night before surgery and the morning prior to coming to the hospital:   -Do not shave the surgical area.   -Shower and wash your hair and body as usual with anti-bacterial  soap and shampoo.   -Rinse your hair and body completely.   -Use one packet of hibiclens to wash the surgical site (using your hand) gently for 5 minutes.  Do not scrub you  skin too hard.   -Do not use hibiclens on your head, face, or genitals.   -Do not wash with anti-bacterial soap after you use the hibiclens.   -Rinse your body thoroughly.   -Dry with clean, soft towel.  Do not use lotion, cream, deodorant, or powders on   the surgical site.    Use antibacterial soap in place of hibiclens if your surgery is on the head, face or genitals.         Surgical Site Infection    Prevention of surgical site infections:     -Keep incisions clean and dry.   -Do not soak/submerge incisions in water until completely healed.   -Do not apply lotions, powders, creams, or deodorants to site.   -Always make sure hands are cleaned with antibacterial soap/ alcohol-based   prior to touching the surgical site.  (This includes doctors, nurses, staff, and yourself.)    Signs and symptoms:   -Redness and pain around the area where you had surgery   -Drainage of cloudy fluid from your surgical wound   -Fever over 100.4  I have read or had read and explained to me, and understand the above information.

## 2017-09-13 ENCOUNTER — ANESTHESIA EVENT (OUTPATIENT)
Dept: SURGERY | Facility: HOSPITAL | Age: 67
End: 2017-09-13
Payer: OTHER GOVERNMENT

## 2017-09-14 ENCOUNTER — HOSPITAL ENCOUNTER (OUTPATIENT)
Facility: HOSPITAL | Age: 67
Discharge: HOME | End: 2017-09-14
Attending: ORTHOPAEDIC SURGERY | Admitting: ORTHOPAEDIC SURGERY
Payer: OTHER GOVERNMENT

## 2017-09-14 ENCOUNTER — ANESTHESIA (OUTPATIENT)
Dept: SURGERY | Facility: HOSPITAL | Age: 67
End: 2017-09-14
Payer: OTHER GOVERNMENT

## 2017-09-14 DIAGNOSIS — M65.341 TRIGGER FINGER, RIGHT RING FINGER: Primary | ICD-10-CM

## 2017-09-14 PROCEDURE — 37000009 HC ANESTHESIA EA ADD 15 MINS: Performed by: ORTHOPAEDIC SURGERY

## 2017-09-14 PROCEDURE — 37000008 HC ANESTHESIA 1ST 15 MINUTES: Performed by: ORTHOPAEDIC SURGERY

## 2017-09-14 PROCEDURE — 36000706: Performed by: ORTHOPAEDIC SURGERY

## 2017-09-14 PROCEDURE — 26055 INCISE FINGER TENDON SHEATH: CPT | Mod: F8,,, | Performed by: ORTHOPAEDIC SURGERY

## 2017-09-14 PROCEDURE — 25000003 PHARM REV CODE 250: Performed by: ORTHOPAEDIC SURGERY

## 2017-09-14 PROCEDURE — 71000033 HC RECOVERY, INTIAL HOUR: Performed by: ORTHOPAEDIC SURGERY

## 2017-09-14 PROCEDURE — 63600175 PHARM REV CODE 636 W HCPCS: Performed by: NURSE ANESTHETIST, CERTIFIED REGISTERED

## 2017-09-14 PROCEDURE — 25000003 PHARM REV CODE 250: Performed by: NURSE ANESTHETIST, CERTIFIED REGISTERED

## 2017-09-14 PROCEDURE — 25000003 PHARM REV CODE 250: Performed by: ANESTHESIOLOGY

## 2017-09-14 PROCEDURE — 71000015 HC POSTOP RECOV 1ST HR: Performed by: ORTHOPAEDIC SURGERY

## 2017-09-14 PROCEDURE — 36000707: Performed by: ORTHOPAEDIC SURGERY

## 2017-09-14 RX ORDER — LIDOCAINE HYDROCHLORIDE 10 MG/ML
1 INJECTION, SOLUTION EPIDURAL; INFILTRATION; INTRACAUDAL; PERINEURAL ONCE
Status: DISCONTINUED | OUTPATIENT
Start: 2017-09-14 | End: 2017-09-14 | Stop reason: HOSPADM

## 2017-09-14 RX ORDER — SODIUM CHLORIDE, SODIUM LACTATE, POTASSIUM CHLORIDE, CALCIUM CHLORIDE 600; 310; 30; 20 MG/100ML; MG/100ML; MG/100ML; MG/100ML
INJECTION, SOLUTION INTRAVENOUS CONTINUOUS PRN
Status: DISCONTINUED | OUTPATIENT
Start: 2017-09-14 | End: 2017-09-14

## 2017-09-14 RX ORDER — DEXAMETHASONE SODIUM PHOSPHATE 4 MG/ML
INJECTION, SOLUTION INTRA-ARTICULAR; INTRALESIONAL; INTRAMUSCULAR; INTRAVENOUS; SOFT TISSUE
Status: DISCONTINUED | OUTPATIENT
Start: 2017-09-14 | End: 2017-09-14

## 2017-09-14 RX ORDER — LIDOCAINE HCL/PF 100 MG/5ML
SYRINGE (ML) INTRAVENOUS
Status: DISCONTINUED | OUTPATIENT
Start: 2017-09-14 | End: 2017-09-14

## 2017-09-14 RX ORDER — METOCLOPRAMIDE HYDROCHLORIDE 5 MG/ML
10 INJECTION INTRAMUSCULAR; INTRAVENOUS EVERY 10 MIN PRN
Status: DISCONTINUED | OUTPATIENT
Start: 2017-09-14 | End: 2017-09-14 | Stop reason: HOSPADM

## 2017-09-14 RX ORDER — MEPERIDINE HYDROCHLORIDE 50 MG/ML
12.5 INJECTION INTRAMUSCULAR; INTRAVENOUS; SUBCUTANEOUS ONCE AS NEEDED
Status: DISCONTINUED | OUTPATIENT
Start: 2017-09-14 | End: 2017-09-14 | Stop reason: HOSPADM

## 2017-09-14 RX ORDER — SODIUM CHLORIDE, SODIUM LACTATE, POTASSIUM CHLORIDE, CALCIUM CHLORIDE 600; 310; 30; 20 MG/100ML; MG/100ML; MG/100ML; MG/100ML
INJECTION, SOLUTION INTRAVENOUS CONTINUOUS
Status: DISCONTINUED | OUTPATIENT
Start: 2017-09-14 | End: 2017-09-14 | Stop reason: HOSPADM

## 2017-09-14 RX ORDER — SODIUM CHLORIDE 0.9 % (FLUSH) 0.9 %
3 SYRINGE (ML) INJECTION EVERY 8 HOURS
Status: DISCONTINUED | OUTPATIENT
Start: 2017-09-14 | End: 2017-09-14 | Stop reason: HOSPADM

## 2017-09-14 RX ORDER — FENTANYL CITRATE 50 UG/ML
INJECTION, SOLUTION INTRAMUSCULAR; INTRAVENOUS
Status: DISCONTINUED | OUTPATIENT
Start: 2017-09-14 | End: 2017-09-14

## 2017-09-14 RX ORDER — ONDANSETRON 2 MG/ML
INJECTION INTRAMUSCULAR; INTRAVENOUS
Status: DISCONTINUED | OUTPATIENT
Start: 2017-09-14 | End: 2017-09-14

## 2017-09-14 RX ORDER — BUPIVACAINE HYDROCHLORIDE 2.5 MG/ML
INJECTION, SOLUTION EPIDURAL; INFILTRATION; INTRACAUDAL
Status: DISCONTINUED | OUTPATIENT
Start: 2017-09-14 | End: 2017-09-14 | Stop reason: HOSPADM

## 2017-09-14 RX ORDER — SODIUM CHLORIDE 9 MG/ML
INJECTION, SOLUTION INTRAVENOUS CONTINUOUS
Status: DISCONTINUED | OUTPATIENT
Start: 2017-09-15 | End: 2017-09-14 | Stop reason: HOSPADM

## 2017-09-14 RX ORDER — SODIUM CHLORIDE 0.9 % (FLUSH) 0.9 %
3 SYRINGE (ML) INJECTION
Status: DISCONTINUED | OUTPATIENT
Start: 2017-09-14 | End: 2017-09-14 | Stop reason: HOSPADM

## 2017-09-14 RX ORDER — PROPOFOL 10 MG/ML
INJECTION, EMULSION INTRAVENOUS
Status: DISCONTINUED | OUTPATIENT
Start: 2017-09-14 | End: 2017-09-14

## 2017-09-14 RX ORDER — HYDROCODONE BITARTRATE AND ACETAMINOPHEN 10; 325 MG/1; MG/1
TABLET ORAL
Qty: 40 TABLET | Refills: 0 | Status: SHIPPED | OUTPATIENT
Start: 2017-09-14 | End: 2018-01-19

## 2017-09-14 RX ORDER — CEFAZOLIN SODIUM 2 G/50ML
2 SOLUTION INTRAVENOUS
Status: DISCONTINUED | OUTPATIENT
Start: 2017-09-14 | End: 2017-09-14 | Stop reason: HOSPADM

## 2017-09-14 RX ORDER — OXYCODONE HYDROCHLORIDE 5 MG/1
5 TABLET ORAL ONCE AS NEEDED
Status: COMPLETED | OUTPATIENT
Start: 2017-09-14 | End: 2017-09-14

## 2017-09-14 RX ADMIN — FENTANYL CITRATE 100 MCG: 50 INJECTION, SOLUTION INTRAMUSCULAR; INTRAVENOUS at 07:09

## 2017-09-14 RX ADMIN — LIDOCAINE HYDROCHLORIDE 80 MG: 20 INJECTION, SOLUTION INTRAVENOUS at 07:09

## 2017-09-14 RX ADMIN — OXYCODONE HYDROCHLORIDE 5 MG: 5 TABLET ORAL at 08:09

## 2017-09-14 RX ADMIN — FENTANYL CITRATE 50 MCG: 50 INJECTION, SOLUTION INTRAMUSCULAR; INTRAVENOUS at 08:09

## 2017-09-14 RX ADMIN — ONDANSETRON 4 MG: 2 INJECTION, SOLUTION INTRAMUSCULAR; INTRAVENOUS at 08:09

## 2017-09-14 RX ADMIN — PROPOFOL 160 MG: 10 INJECTION, EMULSION INTRAVENOUS at 07:09

## 2017-09-14 RX ADMIN — SODIUM CHLORIDE, SODIUM LACTATE, POTASSIUM CHLORIDE, AND CALCIUM CHLORIDE: 600; 310; 30; 20 INJECTION, SOLUTION INTRAVENOUS at 07:09

## 2017-09-14 RX ADMIN — DEXAMETHASONE SODIUM PHOSPHATE 4 MG: 4 INJECTION, SOLUTION INTRA-ARTICULAR; INTRALESIONAL; INTRAMUSCULAR; INTRAVENOUS; SOFT TISSUE at 07:09

## 2017-09-14 NOTE — ANESTHESIA PREPROCEDURE EVALUATION
09/14/2017  Luigi Murillo is a 66 y.o., male.    Anesthesia Evaluation    I have reviewed the Patient Summary Reports.    I have reviewed the Nursing Notes.   I have reviewed the Medications.     Review of Systems  Anesthesia Hx:  Denies Family Hx of Anesthesia complications.   Denies Personal Hx of Anesthesia complications.   Social:  Non-Smoker    Cardiovascular:   Hypertension CAD  CABG/stent  Angina   CONCLUSIONS     1 - Concentric remodeling.     2 - No wall motion abnormalities.     3 - Normal left ventricular systolic function (EF 60-65%).     4 - Normal left ventricular diastolic function.     5 - Normal right ventricular systolic function .     6 - The estimated PA systolic pressure is 33 mmHg.     7 - Trivial tricuspid regurgitation.     No evidence of stress induced myocardial ischemia.    Pulmonary:  Pulmonary Normal    Musculoskeletal:   Arthritis     Neurological:   Headaches    Psych:   Psychiatric History          Physical Exam  General:  Well nourished    Airway/Jaw/Neck:  Airway Findings: Mouth Opening: Normal Tongue: Normal  Mallampati: I      Dental:  Dental Findings: upper front caps   Chest/Lungs:  Chest/Lungs Findings: Normal Respiratory Rate     Heart/Vascular:  Heart Findings: Normal            Anesthesia Plan  Type of Anesthesia, risks & benefits discussed:  Anesthesia Type:  general  Patient's Preference:   Intra-op Monitoring Plan:   Intra-op Monitoring Plan Comments:   Post Op Pain Control Plan:   Post Op Pain Control Plan Comments:   Induction:   IV  Beta Blocker:  Patient is on a Beta-Blocker and has received one dose within the past 24 hours (No further documentation required).       Informed Consent: Patient understands risks and agrees with Anesthesia plan.  Questions answered. Anesthesia consent signed with patient.  ASA Score: 3     Day of Surgery Review of History &  Physical: I have interviewed and examined the patient. I have reviewed the patient's H&P dated:  There are no significant changes.          Ready For Surgery From Anesthesia Perspective.

## 2017-09-14 NOTE — TRANSFER OF CARE
"Anesthesia Transfer of Care Note    Patient: Luigi Murillo    Procedure(s) Performed: Procedure(s) (LRB):  RELEASE-FINGER-TRIGGER, RIGHT RING FINGER. RIGHT (Right)    Patient location: PACU    Anesthesia Type: general    Transport from OR: Transported from OR on room air with adequate spontaneous ventilation    Post pain: adequate analgesia    Post assessment: no apparent anesthetic complications    Post vital signs: stable    Level of consciousness: awake    Nausea/Vomiting: no nausea/vomiting    Complications: none    Transfer of care protocol was followed      Last vitals:   Visit Vitals  BP (!) 109/57 (BP Location: Left arm, Patient Position: Lying)   Pulse 74   Temp 36.3 °C (97.3 °F) (Temporal)   Resp 16   Ht 5' 11" (1.803 m)   Wt 83.9 kg (185 lb)   SpO2 95%   BMI 25.80 kg/m²     "

## 2017-09-14 NOTE — ANESTHESIA POSTPROCEDURE EVALUATION
"Anesthesia Post Evaluation    Patient: Luigi Murillo    Procedure(s) Performed: Procedure(s) (LRB):  RELEASE-FINGER-TRIGGER, RIGHT RING FINGER. RIGHT (Right)    Final Anesthesia Type: general  Patient location during evaluation: PACU  Patient participation: Yes- Able to Participate  Level of consciousness: awake and alert  Post-procedure vital signs: reviewed and stable  Pain management: adequate  Airway patency: patent  PONV status at discharge: No PONV  Anesthetic complications: no      Cardiovascular status: blood pressure returned to baseline  Respiratory status: unassisted  Hydration status: euvolemic  Follow-up not needed.        Visit Vitals  /67   Pulse 77   Temp 36.8 °C (98.2 °F) (Temporal)   Resp 13   Ht 5' 11" (1.803 m)   Wt 83.9 kg (185 lb)   SpO2 98%   BMI 25.80 kg/m²       Pain/Surya Score: Pain Assessment Performed: Yes (9/14/2017  8:27 AM)  Presence of Pain: complains of pain/discomfort (9/14/2017  9:00 AM)  Pain Rating Prior to Med Admin: 4 (9/14/2017  8:58 AM)  Pain Rating Post Med Admin: 4 (pt states pain tolerable) (9/14/2017  9:00 AM)  Surya Score: 10 (9/14/2017  9:00 AM)      "

## 2017-09-14 NOTE — H&P
Subjective:     Patient is a 66 y.o. male presented with a history of right ring finger triggering. He is right hand dominant.     Onset of symptoms was gradual starting several months ago with gradually worsening course since that time. He is being admitted for outpatient surgical management of this condition.     Patient Active Problem List    Diagnosis Date Noted    Preop cardiovascular exam 08/25/2017    Trigger finger, right ring finger 07/17/2017    CAD in native artery 06/02/2015    Headache 06/02/2015    Knee pain, right 05/12/2015    Right shoulder pain 05/12/2015    Knee pain 05/20/2014    DDD (degenerative disc disease), lumbar 02/20/2014    AP (angina pectoris) 11/18/2013    Hyperlipidemia with target LDL less than 100     Hypertension     Depression, major, in remission     S/P coronary artery stent placement     DJD (degenerative joint disease) of knee     Chronic pain     Benign prostatic hyperplasia with urinary obstruction     Status post coronary artery stent placement 05/11/2012     Past Medical History:   Diagnosis Date    Anticoagulant long-term use     ASA 81mg, effient    BPH (benign prostatic hypertrophy) with urinary obstruction     CAD (coronary artery disease)     2 stents    Chronic pain     DDD (degenerative disc disease), lumbar     Depression     DJD (degenerative joint disease) of knee     Hyperlipidemia LDL goal < 100     Hypertension     MRSA cellulitis     S/P coronary artery stent placement May and Yarely 2011    x2      Past Surgical History:   Procedure Laterality Date    CORONARY ANGIOPLASTY      2 Stents placed a few year ago    CORONARY STENT PLACEMENT      x 2    Epidural steroid injection      Pain management    INJURY      KNEE SURGERY      Right, x2    MANDIBLE FRACTURE SURGERY      accident during  service    ROTATOR CUFF REPAIR      right times 2    VASECTOMY        No prescriptions prior to admission.     Review of patient's  allergies indicates:   Allergen Reactions    Acetaminophen-codeine Rash     #3    Codeine Nausea Only    Latex, natural rubber Rash      Social History   Substance Use Topics    Smoking status: Former Smoker     Packs/day: 1.00     Years: 25.00     Quit date: 5/11/2007    Smokeless tobacco: Never Used    Alcohol use No      Family History   Problem Relation Age of Onset    Heart attack Father     Heart disease Mother     Emphysema Mother     Cancer Mother      unknown    Prostate cancer Brother     Diabetes Paternal Aunt       Review of Systems  A comprehensive review of systems was negative except for: Musculoskeletal: positive for arthralgias    Objective:     No data found.    General     Nursing note and vitals reviewed.  Constitutional: He is oriented to person, place, and time. He appears well-developed and well-nourished.   HENT:   Head: Normocephalic.   Nose: Nose normal.   Eyes: EOM are normal. Pupils are equal, round, and reactive to light.   Neck: Normal range of motion. Neck supple.   Cardiovascular: Normal rate and regular rhythm.    Pulmonary/Chest: Effort normal.   Abdominal: Soft. He exhibits no distension. There is no tenderness.   Neurological: He is alert and oriented to person, place, and time.   Psychiatric: He has a normal mood and affect. His behavior is normal.      General Musculoskeletal Exam   Gait: normal   Pelvic Obliquity: none     Right Ankle/Foot Exam   Right ankle exam is normal.     Range of Motion   The patient has normal right ankle ROM.     Alignment   Forefoot Alignment: normal     Muscle Strength   The patient has normal right ankle strength.     Tests   Anterior drawer: negative  Varus tilt: negative  Heel Walk: able to perform  Tiptoe Walk: able to perform     Other   Sensation: normal  Peroneal Subluxation: negative     Left Ankle/Foot Exam   Left ankle exam is normal.     Range of Motion   The patient has normal left ankle ROM.      Alignment   Forefoot  Alignment: normal     Muscle Strength   The patient has normal left ankle strength.     Tests   Anterior drawer: negative  Varus tilt: negative  Heel Walk: able to perform  Tiptoe Walk: able to perform     Other   Sensation: normal  Peroneal Subluxation: negative  Right Knee Exam   Right knee exam is normal.     Inspection   Erythema: absent  Swelling: absent  Effusion: effusion  Deformity: deformity  Bruising: absent     Range of Motion   The patient has normal right knee ROM.     Tests   Meniscus   Goran:  Medial - negative Lateral - negative  Ligament Examination Lachman: normal (-1 to 2mm) PCL-Posterior Drawer: normal (0 to 2mm)     MCL - Valgus: normal (0 to 2mm)  LCL - Varus: normalPivot Shift: normal (Equal)  Posterolateral Corner: unstable (>15 degrees difference)  Patella   Patellar Apprehension: negative  Passive Patellar Tilt: neutral  Patellar Tracking: normal  Patellar Grind: negative     Other   Sensation: normal     Left Knee Exam   Left knee exam is normal.     Inspection   Erythema: absent  Swelling: absent  Effusion: absent  Deformity: deformity  Bruising: absent     Range of Motion   The patient has normal left knee ROM.     Tests   Meniscus   Goran:  Medial - negative Lateral - negative  Stability Lachman: normal (-1 to 2mm) PCL-Posterior Drawer: normal (0 to 2mm)  MCL - Valgus: normal (0 to 2mm)  LCL - Varus: normal (0 to 2mm)Pivot Shift: normal (Equal)  Posterolateral Corner: unstable (>15 degrees difference)  Patella   Patellar Apprehension: negative  Passive Patellar Tilt: neutral  Patellar Tracking: normal  Patellar Grind: negative     Other   Sensation: normal     Right Hip Exam   Right hip exam is normal.      Inspection   Swelling: absent  Bruising: absent     Muscle Strength   The patient has normal right hip strength.     Other   Sensation: normal  Left Hip Exam   Left hip exam is normal.     Inspection   Swelling: absent  Bruising: absent     Range of Motion   The patient has  normal left hip ROM.     Muscle Strength   The patient has normal left hip strength.      Other   Sensation: normal        Back (L-Spine & T-Spine) / Neck (C-Spine) Exam   Back exam is normal.     Neck (C-Spine) Range of Motion   Flexion:     Normal  Extension: Normal  Right Lateral Bend: normal  Left Lateral Bend: normal  Right Rotation: normal  Left Rotation: normal     Spinal Sensation   Right Side Sensation  C-Spine Level: normal   L-Spine Level: normal  S-Spine Level: normal  T-Spine Level: normal  Left Side Sensation  C-Spine Level: normal  L-Spine Level: normal  S-Spine Level: normal  T-Spine Level: normal     Other He has no scoliosis .  Head Tilt:  Negative     Right Hand/Wrist Exam      Inspection   Deformity: Wrist - deformity      Pain   Hand - The patient exhibits pain of the ring MCP.     Swelling   Hand - The patient is swollen on the ring MCP.     Tenderness   The patient is tender to palpation of the watson area.     Range of Motion   The patient has normal right hand/wrist ROM.     Tests   Phalens Sign: negative  Tinels Sign (Medial Nerve): negative  Finkelstein: negative  Cubital Tunnel Compression Test: negative        Other      Neuorologic Exam    Median Distribution: normal  Ulnar Distribution: normal  Radial Distribution: normal     Comments:  Positive triggering of right ring finger, with pain over the A1 pulley        Left Hand/Wrist Exam   Left hand exam is normal.     Inspection   Deformity: Wrist - absent      Range of Motion   The patient has normal left hand/wrist ROM.     Tests   Phalens Sign: negative  Tinels Sign (Medial Nerve): negative  Finkelstein: negative  Cubital Tunnel Compression Test: negative        Other      Sensory Exam  Median Distribution: normal  Ulnar Distribution: normal  Radial Distribution: normal        Right Elbow Exam   Right elbow exam is normal.     Inspection   Effusion: absent  Bruising: absent  Deformity: absent     Range of Motion   The patient has  normal right elbow ROM.     Tests Tinel's Sign (cubital tunnel): negative     Other   Sensation: normal        Left Elbow Exam   Left elbow exam is normal.     Inspection   Effusion: absent  Bruising: absent  Deformity: absent     Range of Motion   The patient has normal left elbow ROM.     Tests Tinel's Sign (cubital tunnel): negative     Other   Sensation: normal     Right Shoulder Exam   Right shoulder exam is normal.     Tenderness   The patient is tender to palpation of the greater tuberosity.     Range of Motion   Active Abduction: normal   Passive Abduction: normal   Extension: normal   Forward Flexion: normal   Forward Elevation: normal  Adduction: normal  External Rotation 0 degrees: normal   Internal Rotation 0 degrees: normal      Muscle Strength   The patient has normal right shoulder strength.     Tests & Signs   Apprehension: negative  Cross Arm: negative  Impingement: negative     Other   Sensation: normal     Left Shoulder Exam   Left shoulder exam is normal.     Range of Motion   Active Abduction: normal   Passive Abduction: normal   Extension: normal   Forward Flexion: normal   Forward Elevation: normal  Adduction: normal  External Rotation 0 degrees: normal   Internal Rotation 0 degrees: normal      Muscle Strength   The patient has normal left shoulder strength.     Tests & Signs   Apprehension: negative  Cross Arm: negative  Impingement: negative     Other   Sensation: normal      Muscle Strength   Right Upper Extremity   Wrist Extension: 5/5/5   Wrist Flexion: 5//5   : 5/5/5   Elbow Pronation:  5/5   Elbow Supination:  5/5   Elbow Extension: 55  Elbow Flexion: 55  Intrinsics: 5/5  Left Upper Extremity  Wrist Extension: /   Wrist Flexion: /   :  /   Elbow Pronation:  5/5   Elbow Supination:  5/5   Elbow Extension: 5/5  Elbow Flexion: /5  Intrinsics: 5/5  Right Lower Extremity   Hip Abduction:    Quadriceps:  5   Hamstrin/5   Left Lower Extremity   Hip Abduction:  5/5   Quadriceps:  5/5   Hamstrin/5      Reflexes      Left Side  Biceps:  2+  Triceps:  2+  Quadriceps:  2+  Achilles:  2+     Right Side   Biceps:  2+  Triceps:  2+  Quadriceps:  2+  Achilles:  2+     Vascular Exam      Right Pulses  Dorsalis Pedis:      2+  Posterior Tibial:      2+  Radial:                    2+        Left Pulses  Dorsalis Pedis:      2+  Posterior Tibial:      2+  Radial:                    2+        Capillary Refill  Right Hand: normal capillary refill  Left Hand: normal capillary refill       Imaging Review  Interval progression prominent degenerative changes at the first CMC joint.  Juxta-articular calcific densities noted.  There is subchondral sclerosis and cystic change noted with mild proximal subluxation of the first metacarpal.  Minimal degenerative change throughout the remainder of the wrist and hand.  Faint calcific density projects along the distal margin the ulnar styloid.  Minimal cystic change base of first proximal phalanx with sclerotic margins unchanged.  No discrete erosion or periosteal reaction throughout the remainder of the hand.  No acute or healing fracture.   Impression           As above.          Assessment:     Trigger finger, right ring finger    Plan:     The various methods of treatment have been discussed with the patient and family.   After consideration of risks, benefits and other options for treatment, the patient has consented to surgical interventions for a right ring trigger finger release on 2017.  The patient understands the material risks of surgery, what is involved, and desires to proceed.

## 2017-09-14 NOTE — BRIEF OP NOTE
[facility]  Brief Operative Note     SUMMARY     Surgery Date: 9/14/2017     Surgeon(s) and Role:     * Roberto Gonzales MD - Primary    Assisting Surgeon: None    Pre-op Diagnosis:  Trigger ring finger of right hand [M65.341]    Post-op Diagnosis:  Post-Op Diagnosis Codes:     * Trigger ring finger of right hand [M65.341]    Procedure(s) (LRB):  RELEASE-FINGER-TRIGGER, RIGHT RING FINGER. RIGHT (Right)    Anesthesia: General    Description of the findings of the procedure: Right ring trigger finger    Findings/Key Components: Right ring trigger finger    Estimated Blood Loss: 2 ccSpecimens:   Specimen (12h ago through future)    None          Discharge Note    SUMMARY     Admit Date: 9/14/2017    Discharge Date and Time:  09/14/2017 9:37 AM    Hospital Course (synopsis of major diagnoses, care, treatment, and services provided during the course of the hospital stay): The patient had their procedure performed, had a routine post operative recovery, and was discharged home.       Final Diagnosis: Post-Op Diagnosis Codes:     * Trigger ring finger of right hand [M65.341]    Disposition: Home or Self Care    Follow Up/Patient Instructions:     Medications:  Reconciled Home Medications:   Current Discharge Medication List      START taking these medications    Details   hydrocodone-acetaminophen 10-325mg (NORCO)  mg Tab Take 1 or 2 tablets every 4-8 hrs as needed for pain.  Qty: 40 tablet, Refills: 0         CONTINUE these medications which have NOT CHANGED    Details   atorvastatin (LIPITOR) 40 MG tablet Take 1 tablet (40 mg total) by mouth once daily.  Qty: 90 tablet, Refills: 3      buPROPion (WELLBUTRIN XL) 300 MG 24 hr tablet Take 1 tablet (300 mg total) by mouth once daily.  Qty: 90 tablet, Refills: 3      BUTRANS 20 mcg/hour PTWK Apply 1 patch topically every 7 days.  Qty: 4 patch, Refills: 0      cyclobenzaprine (FLEXERIL) 10 MG tablet Take 1 tablet (10 mg total) by mouth nightly as needed  for Muscle spasms.  Qty: 90 tablet, Refills: 2      lisinopril (PRINIVIL,ZESTRIL) 5 MG tablet TAKE 1 TABLET DAILY  Qty: 90 tablet, Refills: 2      metoprolol succinate (TOPROL-XL) 25 MG 24 hr tablet Take 0.5 tablets (12.5 mg total) by mouth once daily.  Qty: 45 tablet, Refills: 3    Associated Diagnoses: CAD in native artery      RANEXA 500 mg Tb12 TAKE 1 TABLET NIGHTLY  Qty: 90 tablet, Refills: 3      tamsulosin (FLOMAX) 0.4 mg Cp24 TAKE 1 CAPSULE EVERY EVENING  Qty: 90 capsule, Refills: 3      aspirin (ECOTRIN) 81 MG EC tablet Take 81 mg by mouth once daily.              Discharge Procedure Orders  Diet general     Activity as tolerated     Call MD for:  temperature >100.4     Call MD for:  persistent nausea and vomiting     Call MD for:  severe uncontrolled pain     Call MD for:  redness, tenderness, or signs of infection (pain, swelling, redness, odor or green/yellow discharge around incision site)     Call MD for:  hives     Remove dressing in 72 hours   Order Comments: In 3 days, remove dressing and cover incision with a Band-aid.       Follow-up Information     Roberto Gonzales MD. Schedule an appointment as soon as possible for a visit on 9/25/2017.    Specialty:  Orthopedic Surgery  Why:  For suture removal, For wound re-check, office to call pt with appt date and time, pt can only do mondays or fridays, cannot make sept 27th appt  Contact information:  [address] [place] [address] DR Abigail CHAIREZ [id number] [phone]

## 2017-09-14 NOTE — DISCHARGE INSTRUCTIONS
General Information:  1.  Do not drink alcoholic beverages including beer for 24 hours or as long as you are on pain medication..  2.  Do not drive a motor vehicle, operate machinery or power tools, or signs legal papers for 24 hours or as long as you are on pain medication.   3.  You may experience light-headedness, dizziness, and sleepiness following surgery. Please do not stay alone. A responsible adult should be with you for this 24 hour period.  4.  Go home and rest.  5. Progress slowly to a normal diet unless instructed.  Otherwise, begin with liquids such as soft drinks, then soup and crackers working up to solid foods. Drink plenty of nonalcoholic fluids.  6.  Certain anesthetics and pain medications produce nausea and vomiting in certain       individuals. If nausea becomes a problem at home, call you doctor.  7. A nurse will be calling you sometime after surgery. Do not be alarmed. This is our way of finding out how you are doing.  8. Several times every hour while you are awake, take 2-3 deep breaths and cough. If you had stomach surgery hold a pillow or rolled towel firmly against your stomach before you cough. This will help with any pain the cough might cause.  9. Several times every hour while you are awake, pump and flex your feet 5-6 times and do foot circles. This will help prevent blood clots.  10.Call your doctor for severe pain, bleeding, fever, or signs or symptoms of infection (pain, swelling, redness, foul odor, drainage).  11.You can contact your doctor anytime by [phone] for the [facility] (at [facility]) or [phone] for the [facility] on [facility].   [email or web address] is another way to contact your doctor if you are an active participant online with [facility].

## 2017-09-14 NOTE — OP NOTE
Surgery date: 9/14/ 2017     Preoperative diagnosis: Right ring trigger finger     Postoperative diagnosis: Right ring trigger finger     Operation performed: Right ring trigger finger release     Surgeon: Roberto Gonzales M.D.     First Asst.: Imer Miller PA-C     Anesthesia: Gen.     Drains: None     Findings: Right ring trigger finger     Specimen: None     Estimated blood loss:  2 cc     Indications: This 66-year-old male presented with painful triggering of his right ring finger.  He was indicated for a right ring trigger finger release to decrease his pain, diminish his morbidity, and improve his ultimate functional ability.     Description of operation: The patient was placed in the supine position on the  operating table and satisfactory general anesthesia was administered by anesthesia.  A tourniquet was placed high on the patient's right upper arm and the patient's right upper extremity was then exsanguinated with an Esmarch bandage and the tourniquet inflated to 250 mmHg pressure.  The patient's right hand and arm were then prepped and draped in usual sterile fashion and positioned on an arm board.  A transverse palmar incision was made over the A1 pulley of the ring finger.  Blunt dissection was utilized to identify the A1 pulley and the digital neurovascular bundles were retracted to the radial and ulnar side.  Longitudinal division of the A1 pulley was performed with sharp and blunt dissection.  Complete release of the entire pulley was achieved.  Nodularity of the underlying flexor tendons was noted.  Complete relief of triggering was verified intraoperatively.  Hemostasis was maintained with needle tip electrocautery.  The wound was then closed utilizing 4-0 nylon running horizontal mattress sutures.  A sterile compression dressing was then applied to the wound followed by an Ace wrap.  The patient was awakened and taken to recovery room in satisfactory condition.  Blood loss was less than 2 cc,  there were no complications, and the patient tolerated the procedure well.

## 2017-09-14 NOTE — INTERVAL H&P NOTE
The patient has been examined and the H&P has been reviewed:    I concur with the findings and no changes have occurred since H&P was written.    Anesthesia/Surgery risks, benefits and alternative options discussed and understood by patient/family.          Active Hospital Problems    Diagnosis  POA    Trigger finger, right ring finger [M65.341]  Yes      Resolved Hospital Problems    Diagnosis Date Resolved POA   No resolved problems to display.

## 2017-09-22 RX ORDER — TAMSULOSIN HYDROCHLORIDE 0.4 MG/1
CAPSULE ORAL
Qty: 90 CAPSULE | Refills: 3 | Status: SHIPPED | OUTPATIENT
Start: 2017-09-22 | End: 2019-02-13 | Stop reason: SDUPTHER

## 2017-09-25 ENCOUNTER — OFFICE VISIT (OUTPATIENT)
Dept: ORTHOPEDICS | Facility: CLINIC | Age: 67
End: 2017-09-25
Payer: OTHER GOVERNMENT

## 2017-09-25 VITALS
DIASTOLIC BLOOD PRESSURE: 73 MMHG | SYSTOLIC BLOOD PRESSURE: 108 MMHG | WEIGHT: 185 LBS | BODY MASS INDEX: 25.9 KG/M2 | HEART RATE: 69 BPM | HEIGHT: 71 IN

## 2017-09-25 DIAGNOSIS — Z09 POSTOP CHECK: ICD-10-CM

## 2017-09-25 DIAGNOSIS — M65.341 TRIGGER FINGER, RIGHT RING FINGER: Primary | ICD-10-CM

## 2017-09-25 PROCEDURE — 99024 POSTOP FOLLOW-UP VISIT: CPT | Mod: ,,, | Performed by: PHYSICIAN ASSISTANT

## 2017-09-25 PROCEDURE — 99213 OFFICE O/P EST LOW 20 MIN: CPT | Mod: PBBFAC | Performed by: PHYSICIAN ASSISTANT

## 2017-09-25 PROCEDURE — 99999 PR PBB SHADOW E&M-EST. PATIENT-LVL III: CPT | Mod: PBBFAC,,, | Performed by: PHYSICIAN ASSISTANT

## 2017-09-25 NOTE — PROGRESS NOTES
Subjective:      Patient ID: Luigi Murillo is a 66 y.o. male.    Chief Complaint: Pain of the Right Hand    HPI   The patient is seen today status-post dominant right ring trigger finger release. This was performed on 9/14/17. He has no postop issues. Sutures are to be removed today.     Review of Systems   Constitution: Negative for chills, fever and night sweats.   Respiratory: Negative for cough, shortness of breath and wheezing.    Musculoskeletal: Negative for joint pain.   Gastrointestinal: Negative for diarrhea, nausea and vomiting.   Neurological: Negative for brief paralysis.   Psychiatric/Behavioral: Negative for altered mental status.         Objective:            General    Constitutional: He is oriented to person, place, and time. He appears well-developed and well-nourished.   Neck: Normal range of motion.   Cardiovascular: Normal rate and regular rhythm.    Pulmonary/Chest: Effort normal.   Abdominal: Soft.   Neurological: He is alert and oriented to person, place, and time.   Psychiatric: He has a normal mood and affect. His behavior is normal.             Right Hand/Wrist Exam     Comments:  Incision on right palm- A1 pulley of ring finger is clean, dry and intact.   No evidence for infection.                          Assessment:       Encounter Diagnoses   Name Primary?    Trigger finger, right ring finger Yes    Postop check           Plan:       Luigi was seen today for pain.    Diagnoses and all orders for this visit:    Trigger finger, right ring finger    Postop check        Sutures were removed today without incident.   The patient was educated on scar massage. If he has any concerns, he will return in 4 weeks.         Imer Miller PA-C

## 2017-09-28 VITALS
SYSTOLIC BLOOD PRESSURE: 129 MMHG | BODY MASS INDEX: 25.9 KG/M2 | HEIGHT: 71 IN | DIASTOLIC BLOOD PRESSURE: 67 MMHG | WEIGHT: 185 LBS | HEART RATE: 77 BPM | RESPIRATION RATE: 13 BRPM | OXYGEN SATURATION: 98 % | TEMPERATURE: 98 F

## 2017-10-05 ENCOUNTER — TELEPHONE (OUTPATIENT)
Dept: PAIN MEDICINE | Facility: CLINIC | Age: 67
End: 2017-10-05

## 2017-10-05 NOTE — TELEPHONE ENCOUNTER
Spoke with patient's wife. She needed to reschedule patient's appointment. Gave her some available dates and she will call back to reschedule.

## 2017-10-05 NOTE — TELEPHONE ENCOUNTER
----- Message from Keri Richard sent at 10/5/2017 11:51 AM CDT -----  Contact: wife  Pt wife states that she had just spoken to Debra and would like to schedule 10/23 at 2:30 pm for the pt monthly follow up...[phone]

## 2017-10-05 NOTE — TELEPHONE ENCOUNTER
----- Message from Nasrin Junior sent at 10/4/2017  8:40 AM CDT -----  Please call patients wife/Ale at [phone] in reference to patients scheduled appointment.

## 2017-10-23 ENCOUNTER — OFFICE VISIT (OUTPATIENT)
Dept: PAIN MEDICINE | Facility: CLINIC | Age: 67
End: 2017-10-23
Payer: OTHER GOVERNMENT

## 2017-10-23 VITALS
SYSTOLIC BLOOD PRESSURE: 120 MMHG | BODY MASS INDEX: 26.2 KG/M2 | RESPIRATION RATE: 18 BRPM | WEIGHT: 187.81 LBS | DIASTOLIC BLOOD PRESSURE: 70 MMHG | HEART RATE: 74 BPM | TEMPERATURE: 98 F

## 2017-10-23 DIAGNOSIS — M25.561 CHRONIC PAIN OF RIGHT KNEE: ICD-10-CM

## 2017-10-23 DIAGNOSIS — Z79.891 OPIOID CONTRACT EXISTS: ICD-10-CM

## 2017-10-23 DIAGNOSIS — G89.4 CHRONIC PAIN SYNDROME: Primary | ICD-10-CM

## 2017-10-23 DIAGNOSIS — G89.29 CHRONIC RIGHT SHOULDER PAIN: ICD-10-CM

## 2017-10-23 DIAGNOSIS — M51.36 DDD (DEGENERATIVE DISC DISEASE), LUMBAR: ICD-10-CM

## 2017-10-23 DIAGNOSIS — G89.29 CHRONIC PAIN OF RIGHT KNEE: ICD-10-CM

## 2017-10-23 DIAGNOSIS — M25.511 CHRONIC RIGHT SHOULDER PAIN: ICD-10-CM

## 2017-10-23 PROCEDURE — 99213 OFFICE O/P EST LOW 20 MIN: CPT | Mod: S$PBB,,, | Performed by: PHYSICIAN ASSISTANT

## 2017-10-23 PROCEDURE — 99214 OFFICE O/P EST MOD 30 MIN: CPT | Mod: PBBFAC,PO | Performed by: PHYSICIAN ASSISTANT

## 2017-10-23 PROCEDURE — 99999 PR PBB SHADOW E&M-EST. PATIENT-LVL IV: CPT | Mod: PBBFAC,,, | Performed by: PHYSICIAN ASSISTANT

## 2017-10-23 RX ORDER — BUPRENORPHINE 20 UG/H
1 PATCH, EXTENDED RELEASE TRANSDERMAL
Qty: 4 PATCH | Refills: 0 | Status: SHIPPED | OUTPATIENT
Start: 2017-10-25 | End: 2017-11-22

## 2017-10-23 RX ORDER — BUPRENORPHINE 20 UG/H
1 PATCH, EXTENDED RELEASE TRANSDERMAL
Qty: 4 PATCH | Refills: 0 | Status: SHIPPED | OUTPATIENT
Start: 2017-11-22 | End: 2017-12-20

## 2017-10-23 RX ORDER — BUPRENORPHINE 20 UG/H
1 PATCH, EXTENDED RELEASE TRANSDERMAL
Qty: 4 PATCH | Refills: 0 | Status: SHIPPED | OUTPATIENT
Start: 2017-12-20 | End: 2018-01-19 | Stop reason: SDUPTHER

## 2017-10-25 NOTE — PROGRESS NOTES
This note was completed with dictation software and grammatical errors may exist.    CC:Knee pain, shoulder pain    HPI: The patient is a 66-year-old man with history of CAD and stent, hypertension, degenerative joint disease in the right shoulder and knee who presents in referral from Dr. Traore for help with pain.  He returns in follow-up today with multiple pain complaints.  He denies any major changes to his right shoulder or right knee pain.  He has been having some increasing low back pain recently.  He feels that his pain is tolerable with his medication.  He reports recently having right fourth finger trigger finger release.  He denies having weakness, numbness, bladder or bowel incontinence.    Pain intervention history:  He is status post right L3 and L4 transforaminal epidural steroid injections on 3/14/14 with 85% relief at first, now reporting what sounds like about 25% relief.  He is status post right L3 and L4 transforaminal epidural steroid injection on 6/4/14 with 0% relief.  He is status post right knee geniculate nerve block on 11/4/16 with 0% relief so radiofrequency ablation was not scheduled.    Urine drug screen on 1/22/14 was positive for buprenorphine and tramadol but negative for hydrocodone which he was apparently taking.    Repeat drug screen on 2/20/14 again positive for buprenorphine and tramadol but negative for hydrocodone.  5/20/14 and 6/25/14 urine drug screens both consistent.    ROS:He reports fatigability, chest pain at times, easy bruising, joint stiffness, joint swelling and back pain.  Balance of review of systems negative.    Medical, surgical, family and social history reviewed elsewhere in record.    Medications/Allergies: See med card    Vitals:    10/23/17 1445   BP: 120/70   Pulse: 74   Resp: 18   Temp: 97.8 °F (36.6 °C)   TempSrc: Oral   Weight: 85.2 kg (187 lb 13.3 oz)   PainSc:   4   PainLoc: Back         Physical exam:  Gen: A and O x3, pleasant, well-groomed  Skin:  No rashes or obvious lesions  HEENT: PERRLA  CVS: Regular rate and rhythm, normal S1 and S2, no murmurs.  Resp: Clear to auscultation bilaterally, no wheezes or rales.  Abdomen: Soft, NT/ND, normal bowel sounds present.  Musculoskeletal:  No antalgic gait.      Neuro:  Upper extremities: 5/5 strength bilaterally   Lower extremities: 5/5 strength bilaterally  Reflexes: Brachioradialis 2+, Bicep 2+, Tricep 2+. Patellar 2+, Achilles 2+ bilaterally  Sensory: Intact and symmetrical to light touch and pinprick in C2-T1 dermatomes bilaterally.  Intact and symmetrical to light touch and pinprick in L2-S1 dermatomes bilaterally.    Lumbar spine:  Range of motion is mildly limited with extension and full with flexion with increased pain during each maneuver.  Oblique extension causes mild increased pain on the corresponding side.  Jamshid's test is negative bilaterally.  Straight leg raise is negative bilaterally.  Internal/external rotation of hip is negative bilaterally.  Myofascial exam: Mild tenderness to palpation to the bilateral lumbar paraspinous muscles.    Right knee: Mild crepitus on range of motion with pain on passive flexion and extension appeared mild tenderness to superior portion of the joint, lateral portion, no redness or erythema or swelling.  Right shoulder: Range of motion is full but painful with flexion, abduction and internal/external rotation.  Empty can test positive.  Moderate generalized tenderness to palpation to the anterior and lateral shoulder.  Mild tenderness to palpation to the right scapular region.    Imagin11 Xray right knee:   THERE IS SPURRING OF THE SUPERIOR PATELLAR FACET AND NARROWING OF THE PATELLOFEMORAL JOINT SPACE. NO JOINT EFFUSION OR ACUTE OSSEOUS ABNORMALITY IS SEEN.    14 MRI lumbar spine  At the T12-L1 level, no significant disk bulge, central canal stenosis, or neural foraminal stenosis is noted. Mild ligamentous hypertrophy is noted  At the L1-L2 level, no  significant disk bulge, central canal stenosis, or neural foraminal stenosis is noted. Mild ligamentous hypertrophy is noted   At the L2-L3 level, minimal disk bulging may be noted one to 2 mm. Facet arthropathy and ligamentous hypertrophy is noted. Minimal bilateral neural foraminal narrowing is noted. The central canal is approximately 11 mm and may be minimally narrowed.  At the L3-L4 level, broad-based disk bulging appears to be present of approximately 3 to 4 mm with an asymmetric component greater towards the right neuroforamen a 4 mm that may relate to protrusion. Facet arthropathy and ligamentous hypertrophy is noted. Mild central canal stenosis is noted to 9 mm. Mild to moderate right neuroforaminal narrowing appears to present with possible contact of the exiting nerve root on the right. Mild left neural foraminal narrowing is noted.  At the L4-L5 level the disk osteophyte protrusion appears to be present paracentric to the right of approximately 5 mm. Mild to moderate central canal narrowing is noted to 8 mm. Mild to moderate right nerve foraminal narrowing is noted with mild left neuroforaminal narrowing. Contact of the exiting right nerve root may be present. Increased T2 signal is noted suggestive of an annular tear paracentric to the right  At the L5-S1 level, ligamentous hypertrophy is noted in. No significant disk bulge, central canal stenosis, or neural foraminal stenosis is noted.      Assessment:  The patient is a 66-year-old man with history of CAD and stent, hypertension, degenerative joint disease in the right shoulder and knee who presents in referral from Dr. Traore for help with pain.   1. Chronic pain syndrome     2. Opioid contract exists     3. Chronic pain of right knee     4. Chronic right shoulder pain     5. DDD (degenerative disc disease), lumbar         Plan:  1.  Dr. Giron provided prescriptions for Butrans 20 µg per hour.  I have reviewed the [place] Board of Pharmacy  website and there are no abberancies.    2.  Follow-up in 3 months or sooner as needed.    Greater than 50% of this 15 minute visit was spent on counseling the patient.

## 2017-12-21 DIAGNOSIS — I25.10 CAD IN NATIVE ARTERY: ICD-10-CM

## 2017-12-21 RX ORDER — LISINOPRIL 5 MG/1
TABLET ORAL
Qty: 90 TABLET | Refills: 2 | Status: SHIPPED | OUTPATIENT
Start: 2017-12-21 | End: 2018-09-17 | Stop reason: SDUPTHER

## 2017-12-21 RX ORDER — METOPROLOL SUCCINATE 25 MG/1
12.5 TABLET, EXTENDED RELEASE ORAL DAILY
Qty: 45 TABLET | Refills: 2 | Status: SHIPPED | OUTPATIENT
Start: 2017-12-21 | End: 2018-10-02 | Stop reason: SDUPTHER

## 2017-12-21 NOTE — TELEPHONE ENCOUNTER
----- Message from Jaki Lynch sent at 12/21/2017 10:06 AM CST -----  Contact: pt spouse-Ale Aguilera requesting a refill for Metoprolol 25 mg.    Pt use..    [facility] - [place], [place] - [address] [place] [id number]  Phone: [phone] Fax: [phone]    Please adv/call [phone].//thanks. cw

## 2018-01-19 ENCOUNTER — OFFICE VISIT (OUTPATIENT)
Dept: PAIN MEDICINE | Facility: CLINIC | Age: 68
End: 2018-01-19
Payer: MEDICARE

## 2018-01-19 VITALS
TEMPERATURE: 98 F | SYSTOLIC BLOOD PRESSURE: 110 MMHG | BODY MASS INDEX: 26.81 KG/M2 | WEIGHT: 192.25 LBS | DIASTOLIC BLOOD PRESSURE: 70 MMHG | HEART RATE: 78 BPM

## 2018-01-19 DIAGNOSIS — M25.561 CHRONIC PAIN OF RIGHT KNEE: ICD-10-CM

## 2018-01-19 DIAGNOSIS — Z79.891 OPIOID CONTRACT EXISTS: ICD-10-CM

## 2018-01-19 DIAGNOSIS — M51.36 DDD (DEGENERATIVE DISC DISEASE), LUMBAR: ICD-10-CM

## 2018-01-19 DIAGNOSIS — M25.511 CHRONIC RIGHT SHOULDER PAIN: ICD-10-CM

## 2018-01-19 DIAGNOSIS — G89.29 CHRONIC PAIN OF RIGHT KNEE: ICD-10-CM

## 2018-01-19 DIAGNOSIS — G89.4 CHRONIC PAIN SYNDROME: Primary | ICD-10-CM

## 2018-01-19 DIAGNOSIS — G89.29 CHRONIC RIGHT SHOULDER PAIN: ICD-10-CM

## 2018-01-19 PROCEDURE — 99213 OFFICE O/P EST LOW 20 MIN: CPT | Mod: S$PBB,,, | Performed by: PHYSICIAN ASSISTANT

## 2018-01-19 PROCEDURE — 99999 PR PBB SHADOW E&M-EST. PATIENT-LVL IV: CPT | Mod: PBBFAC,,, | Performed by: PHYSICIAN ASSISTANT

## 2018-01-19 PROCEDURE — 99214 OFFICE O/P EST MOD 30 MIN: CPT | Mod: PBBFAC,PN | Performed by: PHYSICIAN ASSISTANT

## 2018-01-19 RX ORDER — BUPRENORPHINE 20 UG/H
1 PATCH, EXTENDED RELEASE TRANSDERMAL
Qty: 4 PATCH | Refills: 0 | Status: SHIPPED | OUTPATIENT
Start: 2018-02-16 | End: 2018-02-26

## 2018-01-19 RX ORDER — BUPRENORPHINE 20 UG/H
1 PATCH, EXTENDED RELEASE TRANSDERMAL
Qty: 4 PATCH | Refills: 0 | Status: SHIPPED | OUTPATIENT
Start: 2018-03-16 | End: 2018-03-09 | Stop reason: SDUPTHER

## 2018-01-19 RX ORDER — BUPRENORPHINE 20 UG/H
1 PATCH, EXTENDED RELEASE TRANSDERMAL
Qty: 4 PATCH | Refills: 0 | Status: SHIPPED | OUTPATIENT
Start: 2018-01-19 | End: 2018-02-16

## 2018-01-19 NOTE — PROGRESS NOTES
This note was completed with dictation software and grammatical errors may exist.    CC:Knee pain, shoulder pain    HPI: The patient is a 67-year-old man with history of CAD and stent, hypertension, degenerative joint disease in the right shoulder and knee who presents in referral from Dr. Traore for help with pain.  He returns in follow-up today with right shoulder pain, right knee pain and low back pain.  He denies any major changes.  Pain is worse with activity and improved with medication and rest.  He denies weakness, numbness, bladder or bowel incontinence.    Pain intervention history:  He is status post right L3 and L4 transforaminal epidural steroid injections on 3/14/14 with 85% relief at first, now reporting what sounds like about 25% relief.  He is status post right L3 and L4 transforaminal epidural steroid injection on 6/4/14 with 0% relief.  He is status post right knee geniculate nerve block on 11/4/16 with 0% relief so radiofrequency ablation was not scheduled.    Urine drug screen on 1/22/14 was positive for buprenorphine and tramadol but negative for hydrocodone which he was apparently taking.    Repeat drug screen on 2/20/14 again positive for buprenorphine and tramadol but negative for hydrocodone.  5/20/14 and 6/25/14 urine drug screens both consistent.    ROS:He reports fatigability, chest pain at times, easy bruising, joint stiffness, joint swelling and back pain.  Balance of review of systems negative.    Medical, surgical, family and social history reviewed elsewhere in record.    Medications/Allergies: See med card    Vitals:    01/19/18 1110   BP: 110/70   Pulse: 78   Temp: 98.2 °F (36.8 °C)   TempSrc: Oral   Weight: 87.2 kg (192 lb 3.9 oz)   PainSc:   2   PainLoc: Back         Physical exam:  Gen: A and O x3, pleasant, well-groomed  Skin: No rashes or obvious lesions  HEENT: PERRLA  CVS: Regular rate and rhythm, normal S1 and S2, no murmurs.  Resp: Clear to auscultation bilaterally, no  wheezes or rales.  Abdomen: Soft, NT/ND, normal bowel sounds present.  Musculoskeletal:  No antalgic gait.      Neuro:  Upper extremities: 5/5 strength bilaterally   Lower extremities: 5/5 strength bilaterally  Reflexes: Brachioradialis 2+, Bicep 2+, Tricep 2+. Patellar 2+, Achilles 2+ bilaterally  Sensory: Intact and symmetrical to light touch and pinprick in C2-T1 dermatomes bilaterally.  Intact and symmetrical to light touch and pinprick in L2-S1 dermatomes bilaterally.    Lumbar spine:  Range of motion is mildly limited with extension and full with flexion with increased pain during each maneuver.  Oblique extension causes mild increased pain on the corresponding side.  Jamshid's test is negative bilaterally.  Straight leg raise is negative bilaterally.  Internal/external rotation of hip is negative bilaterally.  Myofascial exam: Mild tenderness to palpation to the bilateral lumbar paraspinous muscles.    Right knee: Mild crepitus on range of motion with pain on passive flexion and extension appeared mild tenderness to superior portion of the joint, lateral portion, no redness or erythema or swelling.    Right shoulder: Range of motion is full but painful with flexion, abduction and internal/external rotation.  Empty can test positive.  Moderate generalized tenderness to palpation to the anterior and lateral shoulder.  Mild tenderness to palpation to the right scapular region.    Imagin11 Xray right knee:   THERE IS SPURRING OF THE SUPERIOR PATELLAR FACET AND NARROWING OF THE PATELLOFEMORAL JOINT SPACE. NO JOINT EFFUSION OR ACUTE OSSEOUS ABNORMALITY IS SEEN.    14 MRI lumbar spine  At the T12-L1 level, no significant disk bulge, central canal stenosis, or neural foraminal stenosis is noted. Mild ligamentous hypertrophy is noted  At the L1-L2 level, no significant disk bulge, central canal stenosis, or neural foraminal stenosis is noted. Mild ligamentous hypertrophy is noted   At the L2-L3 level,  minimal disk bulging may be noted one to 2 mm. Facet arthropathy and ligamentous hypertrophy is noted. Minimal bilateral neural foraminal narrowing is noted. The central canal is approximately 11 mm and may be minimally narrowed.  At the L3-L4 level, broad-based disk bulging appears to be present of approximately 3 to 4 mm with an asymmetric component greater towards the right neuroforamen a 4 mm that may relate to protrusion. Facet arthropathy and ligamentous hypertrophy is noted. Mild central canal stenosis is noted to 9 mm. Mild to moderate right neuroforaminal narrowing appears to present with possible contact of the exiting nerve root on the right. Mild left neural foraminal narrowing is noted.  At the L4-L5 level the disk osteophyte protrusion appears to be present paracentric to the right of approximately 5 mm. Mild to moderate central canal narrowing is noted to 8 mm. Mild to moderate right nerve foraminal narrowing is noted with mild left neuroforaminal narrowing. Contact of the exiting right nerve root may be present. Increased T2 signal is noted suggestive of an annular tear paracentric to the right  At the L5-S1 level, ligamentous hypertrophy is noted in. No significant disk bulge, central canal stenosis, or neural foraminal stenosis is noted.      Assessment:  The patient is a 67-year-old man with history of CAD and stent, hypertension, degenerative joint disease in the right shoulder and knee who presents in referral from Dr. Traore for help with pain.   1. Chronic pain syndrome     2. Chronic pain of right knee     3. Chronic right shoulder pain     4. DDD (degenerative disc disease), lumbar     5. Opioid contract exists         Plan:  1.  Dr. Giron has provided prescriptions for Butrans 20 µg per hour.  I have reviewed the [place] Board of Pharmacy website and there are no abberancies.    2.  Follow-up in 3 months or sooner as needed.    Greater than 50% of this 15 minute visit was spent on  counseling the patient.

## 2018-02-02 ENCOUNTER — TELEPHONE (OUTPATIENT)
Dept: PAIN MEDICINE | Facility: CLINIC | Age: 68
End: 2018-02-02

## 2018-02-02 RX ORDER — CYCLOBENZAPRINE HCL 10 MG
10 TABLET ORAL NIGHTLY PRN
Qty: 15 TABLET | Refills: 0 | Status: SHIPPED | OUTPATIENT
Start: 2018-02-02 | End: 2018-02-02 | Stop reason: SDUPTHER

## 2018-02-02 RX ORDER — CYCLOBENZAPRINE HCL 10 MG
10 TABLET ORAL NIGHTLY PRN
Qty: 90 TABLET | Refills: 3 | Status: SHIPPED | OUTPATIENT
Start: 2018-02-02 | End: 2019-02-22 | Stop reason: SDUPTHER

## 2018-02-02 RX ORDER — CYCLOBENZAPRINE HCL 10 MG
10 TABLET ORAL NIGHTLY PRN
Qty: 90 TABLET | Refills: 3 | Status: SHIPPED | OUTPATIENT
Start: 2018-02-02 | End: 2018-02-02 | Stop reason: SDUPTHER

## 2018-02-02 NOTE — TELEPHONE ENCOUNTER
It is not allowing me to electronically send to [facility] for some reason.  Please phone it in.

## 2018-02-02 NOTE — TELEPHONE ENCOUNTER
----- Message from Makayla Peguero sent at 2/2/2018 11:52 AM CST -----  Contact: Wife Ale  Patients wife is requesting a refill of his cyclobenzaprine (FLEXERIL) 10 MG tablet.  Would you please call in a couple of weeks to [facility] and send the rest to [facility].  Call back at [phone]. Thank you!    [facility] [place] - [address] [facility]  [address] [facility]  [place] [address]  Phone: [phone] Fax: [phone]    [facility] - [place] [address]  Phone: [phone] Fax: [phone]    [email or web address] - [place] [address]  Phone: [phone] Fax: [phone]

## 2018-02-02 NOTE — TELEPHONE ENCOUNTER
Spoke with patient's wife. She stated patient is out of flexeril. She is asking if you can send a 2 week rx to [facility] and sent another [facility]. She stated they take about 10 days to process and patient is out.

## 2018-02-16 DIAGNOSIS — M25.511 ACUTE PAIN OF RIGHT SHOULDER: Primary | ICD-10-CM

## 2018-02-19 ENCOUNTER — OFFICE VISIT (OUTPATIENT)
Dept: ORTHOPEDICS | Facility: CLINIC | Age: 68
End: 2018-02-19
Payer: MEDICARE

## 2018-02-19 ENCOUNTER — HOSPITAL ENCOUNTER (OUTPATIENT)
Dept: RADIOLOGY | Facility: HOSPITAL | Age: 68
Discharge: HOME | End: 2018-02-19
Attending: ORTHOPAEDIC SURGERY
Payer: MEDICARE

## 2018-02-19 ENCOUNTER — TELEPHONE (OUTPATIENT)
Dept: PAIN MEDICINE | Facility: CLINIC | Age: 68
End: 2018-02-19

## 2018-02-19 ENCOUNTER — TELEPHONE (OUTPATIENT)
Dept: ORTHOPEDICS | Facility: CLINIC | Age: 68
End: 2018-02-19

## 2018-02-19 VITALS
WEIGHT: 191 LBS | HEART RATE: 76 BPM | HEIGHT: 71 IN | DIASTOLIC BLOOD PRESSURE: 66 MMHG | SYSTOLIC BLOOD PRESSURE: 131 MMHG | BODY MASS INDEX: 26.74 KG/M2

## 2018-02-19 DIAGNOSIS — M25.511 ACUTE PAIN OF RIGHT SHOULDER: ICD-10-CM

## 2018-02-19 DIAGNOSIS — S49.91XA INJURY OF RIGHT SHOULDER, INITIAL ENCOUNTER: Primary | ICD-10-CM

## 2018-02-19 DIAGNOSIS — M25.511 STERNOCLAVICULAR JOINT PAIN, RIGHT: Primary | ICD-10-CM

## 2018-02-19 PROCEDURE — 73030 X-RAY EXAM OF SHOULDER: CPT | Mod: TC,FY,PO,RT

## 2018-02-19 PROCEDURE — 99212 OFFICE O/P EST SF 10 MIN: CPT | Mod: PBBFAC,25,PO | Performed by: ORTHOPAEDIC SURGERY

## 2018-02-19 PROCEDURE — 73030 X-RAY EXAM OF SHOULDER: CPT | Mod: 26,RT,, | Performed by: RADIOLOGY

## 2018-02-19 PROCEDURE — 99999 PR PBB SHADOW E&M-EST. PATIENT-LVL II: CPT | Mod: PBBFAC,,, | Performed by: ORTHOPAEDIC SURGERY

## 2018-02-19 PROCEDURE — 99214 OFFICE O/P EST MOD 30 MIN: CPT | Mod: S$PBB,,, | Performed by: ORTHOPAEDIC SURGERY

## 2018-02-19 RX ORDER — MELOXICAM 15 MG/1
15 TABLET ORAL DAILY
Qty: 30 TABLET | Refills: 2 | Status: SHIPPED | OUTPATIENT
Start: 2018-02-19 | End: 2018-10-15

## 2018-02-19 NOTE — PROGRESS NOTES
Subjective:     Patient ID: Luigi Murillo is a 67 y.o. male.    Chief Complaint: Pain of the Right Shoulder     Patient is here right SC joint pain. Bothering him approx 1 year. No known injury      Shoulder Pain    The pain is present in the right shoulder. The pain radiates to the right neck. This is a chronic problem. The current episode started more than 1 year ago. There has been no history of extremity trauma. The problem occurs constantly. The problem has been waxing and waning. The quality of the pain is described as sharp. The pain is at a severity of 3/10. Pertinent negatives include no fever or numbness. He has tried NSAIDs (Ibuprofen) for the symptoms. The treatment provided no relief. Physical therapy was not tried.      Past Medical History:   Diagnosis Date    Anticoagulant long-term use     ASA 81mg, effient    BPH (benign prostatic hypertrophy) with urinary obstruction     CAD (coronary artery disease)     2 stents    Chronic pain     DDD (degenerative disc disease), lumbar     Depression     DJD (degenerative joint disease) of knee     Hyperlipidemia LDL goal < 100     Hypertension     MRSA cellulitis     S/P coronary artery stent placement May and Yarely 2011    x2     Past Surgical History:   Procedure Laterality Date    CORONARY ANGIOPLASTY      2 Stents placed a few year ago    CORONARY STENT PLACEMENT      x 2    Epidural steroid injection      Pain management    INJURY      KNEE SURGERY      Right, x2    MANDIBLE FRACTURE SURGERY      accident during  service    ROTATOR CUFF REPAIR      right times 2    VASECTOMY       Family History   Problem Relation Age of Onset    Heart attack Father     Heart disease Mother     Emphysema Mother     Cancer Mother      unknown    Prostate cancer Brother     Diabetes Paternal Aunt      Social History     Social History    Marital status:      Spouse name: N/A    Number of children: N/A    Years of education: N/A      Occupational History    Not on file.     Social History Main Topics    Smoking status: Former Smoker     Packs/day: 1.00     Years: 25.00     Quit date: 5/11/2007    Smokeless tobacco: Never Used    Alcohol use No    Drug use: Yes     Types: Hydrocodone    Sexual activity: Not on file     Other Topics Concern    Not on file     Social History Narrative    No narrative on file     Medication List with Changes/Refills   Current Medications    ASPIRIN (ECOTRIN) 81 MG EC TABLET    Take 81 mg by mouth once daily.     ATORVASTATIN (LIPITOR) 40 MG TABLET    Take 1 tablet (40 mg total) by mouth once daily.    BUPROPION (WELLBUTRIN XL) 300 MG 24 HR TABLET    Take 1 tablet (300 mg total) by mouth once daily.    BUTRANS 20 MCG/HOUR PTWK    Apply 1 patch topically every 7 days.    BUTRANS 20 MCG/HOUR PTWK    Apply 1 patch topically every 7 days.    CYCLOBENZAPRINE (FLEXERIL) 10 MG TABLET    Take 1 tablet (10 mg total) by mouth nightly as needed for Muscle spasms.    LISINOPRIL (PRINIVIL,ZESTRIL) 5 MG TABLET    TAKE 1 TABLET DAILY    METOPROLOL SUCCINATE (TOPROL-XL) 25 MG 24 HR TABLET    Take 0.5 tablets (12.5 mg total) by mouth once daily.    RANEXA 500 MG TB12    TAKE 1 TABLET NIGHTLY    TAMSULOSIN (FLOMAX) 0.4 MG CP24    TAKE 1 CAPSULE EVERY EVENING     Review of patient's allergies indicates:   Allergen Reactions    Acetaminophen-codeine Rash     #3    Codeine Nausea Only    Latex, natural rubber Rash     Review of Systems   Constitution: Negative for fever and night sweats.   HENT: Negative for hearing loss.    Eyes: Negative for blurred vision and visual disturbance.   Cardiovascular: Negative for chest pain and leg swelling.   Respiratory: Positive for shortness of breath.    Endocrine: Negative for polyuria.   Hematologic/Lymphatic: Negative for bleeding problem.   Skin: Negative for rash.   Musculoskeletal: Positive for joint pain and muscle cramps. Negative for back pain, joint swelling and muscle  weakness.   Gastrointestinal: Negative for melena.   Genitourinary: Negative for hematuria.   Neurological: Negative for loss of balance, numbness and paresthesias.   Psychiatric/Behavioral: Negative for altered mental status.       Objective:   Body mass index is 26.64 kg/m².  Vitals:    02/19/18 0740   BP: 131/66   Pulse: 76       General: Luigi is well-developed, well-nourished, appears stated age, in no acute distress, alert and oriented to time, place and person.       General    Vitals reviewed.  Constitutional: He is oriented to person, place, and time. He appears well-developed and well-nourished. No distress.   HENT:   Nose: Nose normal.   Mouth/Throat: No oropharyngeal exudate.   Eyes: Pupils are equal, round, and reactive to light. Right eye exhibits no discharge. Left eye exhibits no discharge.   Neck: Normal range of motion.   Cardiovascular: Normal rate and intact distal pulses.    Pulmonary/Chest: Effort normal and breath sounds normal. No respiratory distress.   Neurological: He is alert and oriented to person, place, and time. He has normal reflexes. He displays normal reflexes. No cranial nerve deficit. Coordination normal.   Psychiatric: He has a normal mood and affect. His behavior is normal. Judgment and thought content normal.         Back (L-Spine & T-Spine) / Neck (C-Spine) Exam     Tenderness   The patient is tender to palpation of the right SC joint.   Right Shoulder Exam     Inspection/Observation   Swelling: absent  Bruising: absent  Scars: absent  Deformity: absent  Scapular Winging: absent  Scapular Dyskinesia: negative  Atrophy: absent    Range of Motion   Active Abduction:  90 normal   Passive Abduction:  160 normal   Extension:  0 normal   Forward Flexion:  110 normal   Forward Elevation: 180 normal  Adduction: 40 normal  External Rotation 0 degrees:  50 normal   External Rotation 90 degrees: 90 normal  Internal Rotation 0 degrees:  T10 normal   Internal Rotation 90 degrees:  30  normal     Tests & Signs   Apprehension: negative  Cross Arm: negative  Drop Arm: negative  Hawkin's test: negative  Impingement: negative  Sulcus: absent  Anterosuperior Escape: negative  Lag Sign 0 degrees: negative  Lag Sign 90 degrees: negative  Lift Off Sign: negative  Belly Press: negative  Active Compression Test (Wrangell's Sign): negative  Yergason's Test: negative  Speed's Test: negative  Anterior Drawer Test: 1+   Posterior Drawer Test: 1+  Relocation 90 degrees: negative  Relocation > 90 degrees: negative  Bear Hug: negative  Moving Valgus: negative  Jerk Test: negative    Other   Sensation: normal    Comments:  Prominence anteriorly of R SC joint    Left Shoulder Exam     Inspection/Observation   Swelling: absent  Bruising: absent  Scars: absent  Deformity: absent  Scapular Winging: absent  Scapular Dyskinesia: negative  Atrophy: absent    Tenderness   The patient is experiencing no tenderness.         Range of Motion   Active Abduction:  160 normal   Passive Abduction:  170 normal   Extension:  0 normal   Forward Flexion:  170 normal   Forward Elevation: 180 normal  Adduction: 40 normal  External Rotation 0 degrees:  50 normal   External Rotation 90 degrees: 90 normal  Internal Rotation 0 degrees:  T7 normal   Internal Rotation 90 degrees:  30 normal     Tests & Signs   Apprehension: negative  Cross Arm: negative  Drop Arm: negative  Hawkin's test: negative  Impingement: negative  Sulcus: absent  Anterosuperior Escape: negative  Lag Sign 0 degrees: negative  Lag Sign 90 degrees: negative  Lift Off Sign: negative  Belly Press: negative  Active Compression test (Wrangell's Sign): negative  Yergasons's Test: negative  Speed's Test: negative  Anterior Drawer Test: 1+  Posterior Drawer Test: 1+  Relocation 90 degrees: negative  Relocation > 90 degrees: negative  Bear Hug: negative  Moving Valgus: negative  Jerk Test: negative    Other   Sensation: normal       Muscle Strength   Right Upper Extremity    Shoulder Abduction: 5/5   Shoulder Internal Rotation: 5/5   Shoulder External Rotation: 5/5   Supraspinatus: 5/5/5   Subscapularis: 5/5/5   Biceps: 5/5/5   Left Upper Extremity  Shoulder Abduction: 5/5   Shoulder Internal Rotation: 5/5   Shoulder External Rotation: 5/5   Supraspinatus: 5/5/5   Subscapularis: 5/5/5   Biceps: 5/5/5     Reflexes     Left Side  Biceps:  2+  Triceps:  2+  Brachioradialis:  2+    Right Side   Biceps:  2+  Triceps:  2+  Brachioradialis:  2+    Vascular Exam     Right Pulses      Radial:                    2+      Left Pulses      Radial:                    2+      Capillary Refill  Right Hand: normal capillary refill  Left Hand: normal capillary refill    XRAYS:  Xrays including AP, Outlet and Axillary Lateral of Left shoulder are ordered / images reviewed by me:    No fracture dislocation or other pathology   Proximal migration of humeral head: None   GH arthritis: None    Technique: 4 views were obtained of the right shoulder.    Comparison: 12/16/2010    Findings: No acute fractures or dislocations visualized.  There is a well-defined 14 mm lucency in the proximal diaphysis of the humerus with sclerotic margins which appears nonaggressive but was not definitely present on prior exam. Joint spaces are well preserved.   Impression       As above. Further characterization of findings could be performed with contrast-enhanced MRI.         Assessment:     Encounter Diagnoses   Name Primary?    Sternoclavicular joint pain, right Yes    Acute pain of right shoulder         Plan:     1. We reviewed with Luigi today, the pathology and natural history of his diagnosis. We had an extensive discussion as to the conservative treatment and management of their condition. We also discussed the variety of treatment options to include medication, physical therapy, diagnostic testing as well as other treatments.The decision was made to go forward with PT and Mobic    2. Will call office if pain  flares up for consideration for referral for Xr guided SC joint injection      3. Will refer to Dr. Miles

## 2018-02-19 NOTE — TELEPHONE ENCOUNTER
Spoke with the patient this morning.  Contacted him to explain he needs to schedule an appointment to be seen by  in [place]. The patient is scheduling an appt to follow with Dr. Miles on a humeral lesion. The patient was busy at the time of the phone call and will call back to get the information listed below.           Schedule with Dr. Alfred Miles (new patient)      Reason for visit: Humeral Lesion (x-rays done)     Address : [address] [place] [address] [id number], [place] [address]     Phone number to Dr. Miles's office: [phone]

## 2018-02-19 NOTE — TELEPHONE ENCOUNTER
Patient is requesting you call him regarding an x-ray. He would not give me any information. Thanks.

## 2018-02-19 NOTE — TELEPHONE ENCOUNTER
I have contacted the patient this morning to explain he needs to schedule an appointment to be seen by  in [place]. The patient is scheduling an appt to follow with Dr. Miles on a humeral lesion. The patient was busy at the time of the phone call and will call back to get the information listed below.        Schedule with Dr. Alfred Miles (new patient)     Reason for visit: Humeral Lesion (x-rays done)    Address : [address] [place] [address] [address], [place] [address]    Phone number to Dr. Miles's office: [phone]      Please give this patient information verbatim when he calls back.   -AS

## 2018-02-19 NOTE — TELEPHONE ENCOUNTER
----- Message from Tammi Gentile sent at 2/19/2018 11:55 AM CST -----  Contact: self  Patient needs a call back to speak to Dr about something that was found on an xray he did today in [place]     Please call back [phone]

## 2018-02-20 ENCOUNTER — TELEPHONE (OUTPATIENT)
Dept: ORTHOPEDICS | Facility: CLINIC | Age: 68
End: 2018-02-20

## 2018-02-20 DIAGNOSIS — M89.9 BONE LESION: Primary | ICD-10-CM

## 2018-02-20 NOTE — TELEPHONE ENCOUNTER
Spoke with patient briefly.  He states that he will call me back because there was an emergency at work.

## 2018-02-20 NOTE — TELEPHONE ENCOUNTER
I have found an orthopedic surgeon in the [place] area that's closer to the patient's job.   jen has been instructed to call the office back to get more infromation. -AS   ----- Message from Leo Toussaint MD sent at 2/20/2018 11:11 AM CST -----  Contact: Pt  I am unaware of any orthopaedic oncologist names in the other areas. Dr Miles only one local. He could probably find one in [place] as well.    ----- Message -----  From: Freeman Broussard MA  Sent: 2/20/2018  11:03 AM  To: Leo Toussaint MD    The patient Works in [place] lives in [place], pt wants to know if he can be seen somewhere in [place] or in [place] for the reason he's being referred to Dr. Miles in [place]. I explained ill get back with him. He said ok. Please advise.     Last appt date: 2/19/2018   ----- Message -----  From: Onelia Cornelius  Sent: 2/20/2018  10:19 AM  To: [facility] Staff    Please give pt a call at [phone] regarding the test he's suppose to have and wants to see if it can be done in [place] or in [place].

## 2018-02-21 ENCOUNTER — TELEPHONE (OUTPATIENT)
Dept: PAIN MEDICINE | Facility: CLINIC | Age: 68
End: 2018-02-21

## 2018-02-21 NOTE — TELEPHONE ENCOUNTER
----- Message from Lucy Joiner sent at 2/20/2018  2:34 PM CST -----  Contact: Patient  Patient returned call. He can be contacted at [phone].    Thanks,  Lucy

## 2018-02-22 NOTE — TELEPHONE ENCOUNTER
----- Message from Nasrin Junior sent at 2/22/2018 12:28 PM CST -----  Returning your call.  Please call patient at [phone]

## 2018-02-26 ENCOUNTER — OFFICE VISIT (OUTPATIENT)
Dept: ORTHOPEDICS | Facility: CLINIC | Age: 68
End: 2018-02-26
Payer: MEDICARE

## 2018-02-26 VITALS — HEIGHT: 71 IN | WEIGHT: 191 LBS | BODY MASS INDEX: 26.74 KG/M2

## 2018-02-26 DIAGNOSIS — M89.9 BONE LESION: ICD-10-CM

## 2018-02-26 DIAGNOSIS — M25.511 RIGHT SHOULDER PAIN, UNSPECIFIED CHRONICITY: Primary | ICD-10-CM

## 2018-02-26 DIAGNOSIS — M25.511 STERNOCLAVICULAR JOINT PAIN, RIGHT: ICD-10-CM

## 2018-02-26 DIAGNOSIS — M75.81 ROTATOR CUFF TENDINITIS, RIGHT: ICD-10-CM

## 2018-02-26 PROCEDURE — 99999 PR PBB SHADOW E&M-EST. PATIENT-LVL III: CPT | Mod: PBBFAC,,, | Performed by: ORTHOPAEDIC SURGERY

## 2018-02-26 PROCEDURE — 99204 OFFICE O/P NEW MOD 45 MIN: CPT | Mod: S$PBB,,, | Performed by: ORTHOPAEDIC SURGERY

## 2018-02-26 PROCEDURE — 99213 OFFICE O/P EST LOW 20 MIN: CPT | Mod: PBBFAC,PN | Performed by: ORTHOPAEDIC SURGERY

## 2018-02-26 NOTE — LETTER
February 26, 2018      Leo Toussaint MD  [address] [address] [place] [address]           [facility] [address] [facility] [address] [place] [address]  Phone: [phone]          Patient: Luigi Murillo   MR Number: [id number]   YOB: 1950   Date of Visit: 2/26/2018       Dear Dr. Leo Toussaint:    Thank you for referring Luigi Murillo to me for evaluation. Attached you will find relevant portions of my assessment and plan of care.    If you have questions, please do not hesitate to call me. I look forward to following Luigi Murillo along with you.    Sincerely,    Terrell Herman MD    Enclosure  CC:  No Recipients    If you would like to receive this communication electronically, please contact [email or web address] or [phone] to request more information on [employer] Link access.    For providers and/or their staff who would like to refer a patient to [facility], please contact us through our one-stop-shop provider referral line, Jonathan Alvarez, at [phone].    If you feel you have received this communication in error or would no longer like to receive these types of communications, please e-mail [email or web address]

## 2018-02-27 NOTE — PROGRESS NOTES
"DATE: 2/26/2018  PATIENT: Luigi Murillo  REFERRING MD:   CHIEF COMPLAINT:   Chief Complaint   Patient presents with    Shoulder Pain     right        HISTORY:  Luigi Murillo is a 67 y.o. right hand dominant male  who presents for initial evaluation of right shoulder pain.  He is employed as a .  He notes increased operative right shoulder.  Notes a few year history.  Of pain.  He said 2 surgeries to his shoulder for rotator cuff problem and a biceps problem.  He had persistent pain and x-rays performed recently which revealed a "lesion of the humerus".  He is now referred here for further evaluation and management.  He notes pain in the shoulder which radiates up to his neck states that it also goes down his arm.  He notes intermittent numbness in leg.  He does go to pain management & pain patch.  He is complaining of pain at the sternoclavicular joint as well as right shoulder.  He presents for evaluation.  Pain is reported at 1/10 today.    PAST MEDICAL/SURGICAL HISTORY:  Past Medical History:   Diagnosis Date    Anticoagulant long-term use     ASA 81mg, effient    BPH (benign prostatic hypertrophy) with urinary obstruction     CAD (coronary artery disease)     2 stents    Chronic pain     DDD (degenerative disc disease), lumbar     Depression     DJD (degenerative joint disease) of knee     Hyperlipidemia LDL goal < 100     Hypertension     MRSA cellulitis     S/P coronary artery stent placement May and Yarely 2011    x2     Past Surgical History:   Procedure Laterality Date    CORONARY ANGIOPLASTY      2 Stents placed a few year ago    CORONARY STENT PLACEMENT      x 2    Epidural steroid injection      Pain management    INJURY      KNEE SURGERY      Right, x2    MANDIBLE FRACTURE SURGERY      accident during  service    ROTATOR CUFF REPAIR      right times 2    VASECTOMY         Current Medications:   Current Outpatient Prescriptions:     aspirin (ECOTRIN) " 81 MG EC tablet, Take 81 mg by mouth once daily. , Disp: , Rfl:     atorvastatin (LIPITOR) 40 MG tablet, Take 1 tablet (40 mg total) by mouth once daily. (Patient taking differently: Take 40 mg by mouth every evening. ), Disp: 90 tablet, Rfl: 3    buPROPion (WELLBUTRIN XL) 300 MG 24 hr tablet, Take 1 tablet (300 mg total) by mouth once daily. (Patient taking differently: Take 300 mg by mouth every evening. ), Disp: 90 tablet, Rfl: 3    [START ON 3/16/2018] BUTRANS 20 mcg/hour PTWK, Apply 1 patch topically every 7 days., Disp: 4 patch, Rfl: 0    cyclobenzaprine (FLEXERIL) 10 MG tablet, Take 1 tablet (10 mg total) by mouth nightly as needed for Muscle spasms., Disp: 90 tablet, Rfl: 3    lisinopril (PRINIVIL,ZESTRIL) 5 MG tablet, TAKE 1 TABLET DAILY, Disp: 90 tablet, Rfl: 2    meloxicam (MOBIC) 15 MG tablet, Take 1 tablet (15 mg total) by mouth once daily., Disp: 30 tablet, Rfl: 2    metoprolol succinate (TOPROL-XL) 25 MG 24 hr tablet, Take 0.5 tablets (12.5 mg total) by mouth once daily., Disp: 45 tablet, Rfl: 2    RANEXA 500 mg Tb12, TAKE 1 TABLET NIGHTLY, Disp: 90 tablet, Rfl: 3    tamsulosin (FLOMAX) 0.4 mg Cp24, TAKE 1 CAPSULE EVERY EVENING, Disp: 90 capsule, Rfl: 3    Family History: family history was reviewed and is noncontributory  Social History:   Social History     Social History    Marital status:      Spouse name: N/A    Number of children: N/A    Years of education: N/A     Occupational History    Not on file.     Social History Main Topics    Smoking status: Former Smoker     Packs/day: 1.00     Years: 25.00     Quit date: 5/11/2007    Smokeless tobacco: Never Used    Alcohol use No    Drug use: Yes     Types: Hydrocodone    Sexual activity: Not on file     Other Topics Concern    Not on file     Social History Narrative    No narrative on file       ROS:  Constitution: Negative for chills, fever, and sweats. Negative for unexplained weight loss.  HENT: Negative for headaches  "and blurry vision.   Cardiovascular: Negative for chest pain, irregular heartbeat, leg swelling and palpitations.   Respiratory: Negative for cough and shortness of breath.   Gastrointestinal: Negative for abdominal pain, heartburn, nausea and vomiting.   Genitourinary: Negative for bladder incontinence and dysuria.   Musculoskeletal: Negative for systemic arthritis, joint swelling, muscle weakness and myalgias.   Neurological: Negative for numbness.   Psychiatric/Behavioral: Negative for depression.   Endocrine: Negative for polyuria.   Hematologic/Lymphatic: Negative for bleeding disorders.  Skin: Negative for poor wound healing.       PHYSICAL EXAM:  Ht 5' 11" (1.803 m)   Wt 86.6 kg (191 lb)   BMI 26.64 kg/m²   Luigi Murillo is a well developed, well nourished male in no acute distress. Physical examination of the right shoulder evaluated the following:    Inspection, palpation and ROM of the cervical spine  Disc compression testing bilaterally  Inspection for swelling, ecchymosis, erythema, deformity and atrophy  Tenderness to palpation of the soft tissue and bony structures  Active and passive range of motion  Sensation of the shoulder and upper extremity  Motor strength in the deltoid, supraspinatus, internal rotators and external rotators  Impingement, apprehension, relocation and Speed's tests  Upper extremity vascular exam (skin temp,color, capillary refill)  Inspection for pseudomotor signs    Remarkable findings included:  Mildly decreased range of motion of the cervical spine.  Negative disc compression sign.  Examination right shoulder reveals well-healed arthroscopic portals.  There is a small incision in the axillary crease consistent with biceps tenodesis.  Range of motion shoulders 120° of forward elevation, 100° of abduction, X rotation with the arm abducted 90° he 5°.  Internal rotation to L3.  Motor strength nearly 5/5 in the supraspinatus and nearly 5/5 in the external rotators.  Positive " impingement sign.  Ross over sign is positive.  Tenderness over the sternoclavicular joint on the right with prominence and mild swelling.          IMAGING:   X-rays the right shoulder performed and reviewed.  There is a small lesion in the proximal humeral shaft drill hole site.  No significant degenerative changes at the glenohumeral joint and acromioclavicular joint are noted.     ASSESSMENT:   Persistent right shoulder pain.  Right sternoclavicular synovitis/arthrosis    PLAN:  The nature of the diagnosis, using models and diagrams when appropriate, was explained to the patient and his wife in detail.  Luigi reports that he does have moderate discomfort although pain is tolerable at this very moment.  He like to pursue this aggressively.  I recommended x-rays of the cervical spine, MRI of the sternoclavicular joint to evaluate for synovitis of arthrosis as well as MRI of the right shoulder to 1 evaluate the lesion in the proximal humerus as well as evaluate 2 prior surgeries.  Follow-up after MRIs of been performed discussed results and further treatment recommendations.      This note was dictated using voice recognition software. Please excuse any grammatical or typographical errors.

## 2018-03-08 NOTE — TELEPHONE ENCOUNTER
----- Message from Barbara Fischer sent at 3/7/2018  3:44 PM CST -----  Wife/Trini is calling to speak to Jocelyne concerning his medications. Please call back at [phone]. She did not want to tell me anything more.

## 2018-03-08 NOTE — TELEPHONE ENCOUNTER
Patient wife called wanting to know if patient could get refill of patches he will be in town on Friday if not he will have to miss work to come get it filled. He has appointment on tomorrow and Monday please advise.

## 2018-03-09 RX ORDER — BUPRENORPHINE 20 UG/H
1 PATCH, EXTENDED RELEASE TRANSDERMAL
Qty: 4 PATCH | Refills: 0 | Status: SHIPPED | OUTPATIENT
Start: 2018-03-16 | End: 2018-03-29 | Stop reason: SDUPTHER

## 2018-03-09 NOTE — TELEPHONE ENCOUNTER
----- Message from Shai Abernathy sent at 3/9/2018  3:15 PM CST -----  Contact: wife Ale  Wife Ale want to speak with Jocelyne regarding patient prescription, please call back at [phone] (home)

## 2018-03-12 ENCOUNTER — OFFICE VISIT (OUTPATIENT)
Dept: ORTHOPEDICS | Facility: CLINIC | Age: 68
End: 2018-03-12
Payer: MEDICARE

## 2018-03-12 VITALS — HEIGHT: 71 IN | BODY MASS INDEX: 26.74 KG/M2 | WEIGHT: 191 LBS

## 2018-03-12 DIAGNOSIS — M25.511 STERNOCLAVICULAR JOINT PAIN, RIGHT: Primary | ICD-10-CM

## 2018-03-12 PROCEDURE — 99213 OFFICE O/P EST LOW 20 MIN: CPT | Mod: PBBFAC,PN | Performed by: ORTHOPAEDIC SURGERY

## 2018-03-12 PROCEDURE — 99999 PR PBB SHADOW E&M-EST. PATIENT-LVL III: CPT | Mod: PBBFAC,,, | Performed by: ORTHOPAEDIC SURGERY

## 2018-03-12 PROCEDURE — 99214 OFFICE O/P EST MOD 30 MIN: CPT | Mod: S$PBB,,, | Performed by: ORTHOPAEDIC SURGERY

## 2018-03-13 NOTE — PROGRESS NOTES
"DATE: 3/12/2018  PATIENT: Luigi Murillo    Attending Physician: Terrell Herman M.D.    HISTORY:  Luigi Murillo is a 67 y.o. male who returns for follow up evaluation of  his right sternoclavicular joint.  He didn't go an MRI which showed significant synovitis and inflammation but no fracture or dislocation.  He presents discuss his MRI results.  Pain is reported at 4/10 today    PMH/PSH/FamHx/SocHx:  Reviewed and unchanged from prior visit    ROS:  Constitution: Negative for chills, fever, and sweats. Negative for unexplained weight loss.  HENT: Negative for headaches and blurry vision.   Cardiovascular: Negative for chest pain, irregular heartbeat, leg swelling and palpitations.   Respiratory: Negative for cough and shortness of breath.   Gastrointestinal: Negative for abdominal pain, heartburn, nausea and vomiting.   Genitourinary: Negative for bladder incontinence and dysuria.   Musculoskeletal: Negative for systemic arthritis, joint swelling, muscle weakness and myalgias.   Neurological: Negative for numbness.   Psychiatric/Behavioral: Negative for depression.   Endocrine: Negative for polyuria.   Hematologic/Lymphatic: Negative for bleeding disorders.  Skin: Negative for poor wound healing.       EXAM:  Ht 5' 11" (1.803 m)   Wt 86.6 kg (191 lb)   BMI 26.64 kg/m²   Luigi Murillo is a well developed, well nourished male in no acute distress. Physical examination of the right shoulder evaluated the following:     Inspection, palpation and ROM of the cervical spine  Disc compression testing bilaterally  Inspection for swelling, ecchymosis, erythema, deformity and atrophy  Tenderness to palpation of the soft tissue and bony structures  Active and passive range of motion  Sensation of the shoulder and upper extremity  Motor strength in the deltoid, supraspinatus, internal rotators and external rotators  Impingement, apprehension, relocation and Speed's tests  Upper extremity vascular exam (skin " temp,color, capillary refill)  Inspection for pseudomotor signs     Remarkable findings included:  Mildly decreased range of motion of the cervical spine.  Negative disc compression sign.  Examination right shoulder reveals well-healed arthroscopic portals.  There is a small incision in the axillary crease consistent with biceps tenodesis.  Range of motion shoulders 120° of forward elevation, 100° of abduction, external rotation with the arm abducted 90° is 50°.  Internal rotation to L3.  Motor strength nearly 5/5 in the supraspinatus and nearly 5/5 in the external rotators.  Positive impingement sign.  Ross over sign is positive.  Tenderness over the sternoclavicular joint on the right with prominence and mild swelling.          IMAGING:   MRI is personally reviewed and compared to the radiologist's report.  Moderate sternoclavicular inflammation on the right with thickening.  11 no subluxation or dislocation or fractures are noted    ASSESSMENT:  Sternoclavicular synovitis/arthrosis, right.    PLAN:  The implications of the patient's evolution of symptoms and findings were explained to the patient and his wife in detail.  I did go over the MRI.  Have explained it shows significant synovitis but no displacement.  Treatment options discussed included observation, ultrasound-guided cortisone injection or arthroscopy of the sternoclavicular joint.. All questions answered and the patient wishes to observe his symptoms.  Should he have persistent symptoms or should they worsen, I recommended cortisone injection.  He'll contact the office should he wish to pursue further treatment.  Activities may otherwise be performed as tolerated.  follow-up if not improving or worse.          This note was dictated using voice recognition software. Please excuse any grammatical or typographical errors.

## 2018-03-29 RX ORDER — BUPRENORPHINE 20 UG/H
1 PATCH, EXTENDED RELEASE TRANSDERMAL
Qty: 4 PATCH | Refills: 0 | Status: SHIPPED | OUTPATIENT
Start: 2018-06-08 | End: 2018-07-06

## 2018-03-29 RX ORDER — BUPRENORPHINE 20 UG/H
1 PATCH, EXTENDED RELEASE TRANSDERMAL
Qty: 4 PATCH | Refills: 0 | Status: SHIPPED | OUTPATIENT
Start: 2018-04-13 | End: 2018-05-11

## 2018-03-29 RX ORDER — BUPRENORPHINE 20 UG/H
1 PATCH, EXTENDED RELEASE TRANSDERMAL
Qty: 4 PATCH | Refills: 0 | Status: SHIPPED | OUTPATIENT
Start: 2018-05-11 | End: 2018-06-08 | Stop reason: SDUPTHER

## 2018-04-04 RX ORDER — RANOLAZINE 500 MG/1
TABLET, FILM COATED, EXTENDED RELEASE ORAL
Qty: 90 TABLET | Refills: 3 | Status: SHIPPED | OUTPATIENT
Start: 2018-04-04 | End: 2019-11-15 | Stop reason: SDUPTHER

## 2018-04-05 ENCOUNTER — TELEPHONE (OUTPATIENT)
Dept: PAIN MEDICINE | Facility: CLINIC | Age: 68
End: 2018-04-05

## 2018-04-05 NOTE — TELEPHONE ENCOUNTER
----- Message from Marina Lynch sent at 4/5/2018  3:06 PM CDT -----  Contact: self  [phone]  Patient is currently out of town, patient's car is in the [employer] shop for repairs. Patient has to cancel the appointment for tomorrow. The next available appointment is Friday 04/20/2018. Patient states he need his rx refilled- butrans patch and can not wait til  04/20/2018 for an appointment, asking to be see earlier.. Thanks!

## 2018-04-05 NOTE — TELEPHONE ENCOUNTER
Spoke with the patient's wife and follow up appointment rescheduled to 4/20. Has 1 patch left to last until he can get back on Monday. Advised he can  RX and keep the follow up on 4/20. Nothing available until then.

## 2018-04-20 ENCOUNTER — OFFICE VISIT (OUTPATIENT)
Dept: PAIN MEDICINE | Facility: CLINIC | Age: 68
End: 2018-04-20
Payer: MEDICARE

## 2018-04-20 VITALS
SYSTOLIC BLOOD PRESSURE: 151 MMHG | WEIGHT: 192.81 LBS | DIASTOLIC BLOOD PRESSURE: 75 MMHG | HEIGHT: 71 IN | BODY MASS INDEX: 26.99 KG/M2 | HEART RATE: 81 BPM

## 2018-04-20 DIAGNOSIS — G89.29 CHRONIC RIGHT SHOULDER PAIN: ICD-10-CM

## 2018-04-20 DIAGNOSIS — M25.561 CHRONIC PAIN OF RIGHT KNEE: ICD-10-CM

## 2018-04-20 DIAGNOSIS — G89.29 CHRONIC PAIN OF LEFT KNEE: ICD-10-CM

## 2018-04-20 DIAGNOSIS — M51.36 DDD (DEGENERATIVE DISC DISEASE), LUMBAR: ICD-10-CM

## 2018-04-20 DIAGNOSIS — G89.29 CHRONIC PAIN OF RIGHT KNEE: ICD-10-CM

## 2018-04-20 DIAGNOSIS — M25.511 CHRONIC RIGHT SHOULDER PAIN: ICD-10-CM

## 2018-04-20 DIAGNOSIS — G89.4 CHRONIC PAIN SYNDROME: Primary | ICD-10-CM

## 2018-04-20 DIAGNOSIS — Z79.891 OPIOID CONTRACT EXISTS: ICD-10-CM

## 2018-04-20 DIAGNOSIS — M25.562 CHRONIC PAIN OF LEFT KNEE: ICD-10-CM

## 2018-04-20 PROCEDURE — 99213 OFFICE O/P EST LOW 20 MIN: CPT | Mod: S$PBB,,, | Performed by: PHYSICIAN ASSISTANT

## 2018-04-20 PROCEDURE — 99213 OFFICE O/P EST LOW 20 MIN: CPT | Mod: PBBFAC,PN | Performed by: PHYSICIAN ASSISTANT

## 2018-04-20 PROCEDURE — 99999 PR PBB SHADOW E&M-EST. PATIENT-LVL III: CPT | Mod: PBBFAC,,, | Performed by: PHYSICIAN ASSISTANT

## 2018-04-20 NOTE — PROGRESS NOTES
"This note was completed with dictation software and grammatical errors may exist.    CC:Knee pain, shoulder pain    HPI: The patient is a 67-year-old man with history of CAD and stent, hypertension, degenerative joint disease in the right shoulder and knee who presents in referral from Dr. Traore for help with pain.  He returns in follow-up today with multiple pain complaints.  He continues to right shoulder pain, right knee pain and low back pain.  However, he reports falling on his left knee several months ago which severely exacerbated this pain.  He would like to see orthopedics for it.  He continues to use his Butrans patches with relief.  He denies weakness, numbness, bladder or bowel incontinence.    Pain intervention history:  He is status post right L3 and L4 transforaminal epidural steroid injections on 3/14/14 with 85% relief at first, now reporting what sounds like about 25% relief.  He is status post right L3 and L4 transforaminal epidural steroid injection on 6/4/14 with 0% relief.  He is status post right knee geniculate nerve block on 11/4/16 with 0% relief so radiofrequency ablation was not scheduled.    Urine drug screen on 1/22/14 was positive for buprenorphine and tramadol but negative for hydrocodone which he was apparently taking.    Repeat drug screen on 2/20/14 again positive for buprenorphine and tramadol but negative for hydrocodone.  5/20/14 and 6/25/14 urine drug screens both consistent.    ROS:He reports fatigability, chest pain at times, easy bruising, joint stiffness, joint swelling and back pain.  Balance of review of systems negative.    Medical, surgical, family and social history reviewed elsewhere in record.    Medications/Allergies: See med card    Vitals:    04/20/18 1434   BP: (!) 151/75   Pulse: 81   Weight: 87.5 kg (192 lb 12.7 oz)   Height: 5' 11" (1.803 m)   PainSc:   8   PainLoc: Knee         Physical exam:  Gen: A and O x3, pleasant, well-groomed  Skin: No rashes or " obvious lesions  HEENT: PERRLA  CVS: Regular rate and rhythm, normal S1 and S2, no murmurs.  Resp: Clear to auscultation bilaterally, no wheezes or rales.  Abdomen: Soft, NT/ND, normal bowel sounds present.  Musculoskeletal:  No antalgic gait.      Neuro:  Upper extremities: 5/5 strength bilaterally   Lower extremities: 5/5 strength bilaterally  Reflexes: Brachioradialis 2+, Bicep 2+, Tricep 2+. Patellar 2+, Achilles 2+ bilaterally  Sensory: Intact and symmetrical to light touch and pinprick in C2-T1 dermatomes bilaterally.  Intact and symmetrical to light touch and pinprick in L2-S1 dermatomes bilaterally.    Lumbar spine:  Range of motion is mildly limited with extension and full with flexion with increased pain during each maneuver.  Oblique extension causes mild increased pain on the corresponding side.  Jamshid's test is negative bilaterally.  Straight leg raise is negative bilaterally.  Internal/external rotation of hip is negative bilaterally.  Myofascial exam: Mild tenderness to palpation to the bilateral lumbar paraspinous muscles.    Right knee: Mild crepitus on range of motion with pain on passive flexion and extension, mild tenderness to superior portion of the joint, lateral portion, no redness or erythema or swelling.    Left knee: Mild crepitus on range of motion with pain on passive flexion and extension, moderate tenderness lateral to the patella, no redness or erythema or swelling.    Right shoulder: Range of motion is full but painful with flexion, abduction and internal/external rotation.  Empty can test positive.  Moderate generalized tenderness to palpation to the anterior and lateral shoulder.  Mild tenderness to palpation to the right scapular region.    Imagin11 Xray right knee:   THERE IS SPURRING OF THE SUPERIOR PATELLAR FACET AND NARROWING OF THE PATELLOFEMORAL JOINT SPACE. NO JOINT EFFUSION OR ACUTE OSSEOUS ABNORMALITY IS SEEN.    14 MRI lumbar spine  At the T12-L1 level,  no significant disk bulge, central canal stenosis, or neural foraminal stenosis is noted. Mild ligamentous hypertrophy is noted  At the L1-L2 level, no significant disk bulge, central canal stenosis, or neural foraminal stenosis is noted. Mild ligamentous hypertrophy is noted   At the L2-L3 level, minimal disk bulging may be noted one to 2 mm. Facet arthropathy and ligamentous hypertrophy is noted. Minimal bilateral neural foraminal narrowing is noted. The central canal is approximately 11 mm and may be minimally narrowed.  At the L3-L4 level, broad-based disk bulging appears to be present of approximately 3 to 4 mm with an asymmetric component greater towards the right neuroforamen a 4 mm that may relate to protrusion. Facet arthropathy and ligamentous hypertrophy is noted. Mild central canal stenosis is noted to 9 mm. Mild to moderate right neuroforaminal narrowing appears to present with possible contact of the exiting nerve root on the right. Mild left neural foraminal narrowing is noted.  At the L4-L5 level the disk osteophyte protrusion appears to be present paracentric to the right of approximately 5 mm. Mild to moderate central canal narrowing is noted to 8 mm. Mild to moderate right nerve foraminal narrowing is noted with mild left neuroforaminal narrowing. Contact of the exiting right nerve root may be present. Increased T2 signal is noted suggestive of an annular tear paracentric to the right  At the L5-S1 level, ligamentous hypertrophy is noted in. No significant disk bulge, central canal stenosis, or neural foraminal stenosis is noted.      Assessment:  The patient is a 67-year-old man with history of CAD and stent, hypertension, degenerative joint disease in the right shoulder and knee who presents in referral from Dr. Traore for help with pain.   1. Chronic pain syndrome     2. Chronic pain of right knee     3. Chronic pain of left knee     4. Chronic right shoulder pain     5. DDD (degenerative disc  disease), lumbar     6. Opioid contract exists         Plan:  1.  The patient will follow-up with Dr. Herman to evaluate his left knee.  2.  The patient had to change his follow-up appointment but had picked up his next 3 months prescriptions.  He had a leftover prescription that was filled in April so his prescriptions dated for April, May and June will be for May, June and July.  3.  Follow-up in 3 months or sooner as needed.

## 2018-04-23 ENCOUNTER — HOSPITAL ENCOUNTER (OUTPATIENT)
Dept: RADIOLOGY | Facility: HOSPITAL | Age: 68
Discharge: HOME | End: 2018-04-23
Attending: ORTHOPAEDIC SURGERY
Payer: MEDICARE

## 2018-04-23 ENCOUNTER — OFFICE VISIT (OUTPATIENT)
Dept: ORTHOPEDICS | Facility: CLINIC | Age: 68
End: 2018-04-23
Payer: MEDICARE

## 2018-04-23 VITALS — BODY MASS INDEX: 26.88 KG/M2 | HEIGHT: 71 IN | WEIGHT: 192 LBS

## 2018-04-23 DIAGNOSIS — M94.262 CHONDROMALACIA, KNEE, LEFT: ICD-10-CM

## 2018-04-23 DIAGNOSIS — M25.562 ACUTE PAIN OF LEFT KNEE: ICD-10-CM

## 2018-04-23 DIAGNOSIS — M25.562 ACUTE PAIN OF LEFT KNEE: Primary | ICD-10-CM

## 2018-04-23 PROCEDURE — 73562 X-RAY EXAM OF KNEE 3: CPT | Mod: 26,59,RT, | Performed by: RADIOLOGY

## 2018-04-23 PROCEDURE — 73562 X-RAY EXAM OF KNEE 3: CPT | Mod: TC,PO,RT

## 2018-04-23 PROCEDURE — 99213 OFFICE O/P EST LOW 20 MIN: CPT | Mod: PBBFAC,25,PN | Performed by: ORTHOPAEDIC SURGERY

## 2018-04-23 PROCEDURE — 99213 OFFICE O/P EST LOW 20 MIN: CPT | Mod: S$PBB,,, | Performed by: ORTHOPAEDIC SURGERY

## 2018-04-23 PROCEDURE — 73564 X-RAY EXAM KNEE 4 OR MORE: CPT | Mod: 26,LT,, | Performed by: RADIOLOGY

## 2018-04-23 PROCEDURE — 99999 PR PBB SHADOW E&M-EST. PATIENT-LVL III: CPT | Mod: PBBFAC,,, | Performed by: ORTHOPAEDIC SURGERY

## 2018-04-23 RX ORDER — BUPROPION HYDROCHLORIDE 300 MG/1
TABLET ORAL
Qty: 90 TABLET | Refills: 0 | Status: SHIPPED | OUTPATIENT
Start: 2018-04-23 | End: 2018-07-22 | Stop reason: SDUPTHER

## 2018-04-23 NOTE — PROGRESS NOTES
DATE: 4/23/2018  PATIENT: Luigi Murillo  REFERRING MD:  CHIEF COMPLAINT:   Chief Complaint   Patient presents with    Left Knee - Pain       HISTORY:  Luigi Murillo is a 67 y.o. male  who presents for initial evaluation of his left knee.  States is well until a few months ago and missed a step and landed directly onto his knee.  He felt something pop.  It started to feel a little bit better but then worse.  He notes pain on the lateral side of his knee.  It radiates down to the ankle.  It worsens during the day.  Takes Motrin as needed without relief.  His past history is remarkable for a right knee open medial meniscectomy with resulting post-meniscectomy arthrosis.  He feels that this is different than his right knee.  Pain is reported at 4/10 today.  He is employed as a  in an industrial building      PAST MEDICAL/SURGICAL HISTORY:  Past Medical History:   Diagnosis Date    Anticoagulant long-term use     ASA 81mg, effient    BPH (benign prostatic hypertrophy) with urinary obstruction     CAD (coronary artery disease)     2 stents    Chronic pain     DDD (degenerative disc disease), lumbar     Depression     DJD (degenerative joint disease) of knee     Hyperlipidemia LDL goal < 100     Hypertension     MRSA cellulitis     S/P coronary artery stent placement May and Yarely 2011    x2     Past Surgical History:   Procedure Laterality Date    CORONARY ANGIOPLASTY      2 Stents placed a few year ago    CORONARY STENT PLACEMENT      x 2    Epidural steroid injection      Pain management    INJURY      KNEE SURGERY      Right, x2    MANDIBLE FRACTURE SURGERY      accident during  service    ROTATOR CUFF REPAIR      right times 2    VASECTOMY         Current Medications:   Current Outpatient Prescriptions:     aspirin (ECOTRIN) 81 MG EC tablet, Take 81 mg by mouth once daily. , Disp: , Rfl:     atorvastatin (LIPITOR) 40 MG tablet, Take 1 tablet (40 mg total) by mouth  once daily. (Patient taking differently: Take 40 mg by mouth every evening. ), Disp: 90 tablet, Rfl: 3    buPROPion (WELLBUTRIN XL) 300 MG 24 hr tablet, TAKE 1 TABLET DAILY, Disp: 90 tablet, Rfl: 0    BUTRANS 20 mcg/hour PTWK, Apply 1 patch topically every 7 days., Disp: 4 patch, Rfl: 0    [START ON 5/11/2018] BUTRANS 20 mcg/hour PTWK, Apply 1 patch topically every 7 days., Disp: 4 patch, Rfl: 0    [START ON 6/8/2018] BUTRANS 20 mcg/hour PTWK, Apply 1 patch topically every 7 days., Disp: 4 patch, Rfl: 0    cyclobenzaprine (FLEXERIL) 10 MG tablet, Take 1 tablet (10 mg total) by mouth nightly as needed for Muscle spasms., Disp: 90 tablet, Rfl: 3    lisinopril (PRINIVIL,ZESTRIL) 5 MG tablet, TAKE 1 TABLET DAILY, Disp: 90 tablet, Rfl: 2    meloxicam (MOBIC) 15 MG tablet, Take 1 tablet (15 mg total) by mouth once daily., Disp: 30 tablet, Rfl: 2    metoprolol succinate (TOPROL-XL) 25 MG 24 hr tablet, Take 0.5 tablets (12.5 mg total) by mouth once daily., Disp: 45 tablet, Rfl: 2    RANEXA 500 mg Tb12, TAKE 1 TABLET NIGHTLY, Disp: 90 tablet, Rfl: 3    tamsulosin (FLOMAX) 0.4 mg Cp24, TAKE 1 CAPSULE EVERY EVENING, Disp: 90 capsule, Rfl: 3    Family History: family history was reviewed and is noncontributory  Social History:   Social History     Social History    Marital status:      Spouse name: N/A    Number of children: N/A    Years of education: N/A     Occupational History    Not on file.     Social History Main Topics    Smoking status: Former Smoker     Packs/day: 1.00     Years: 25.00     Quit date: 5/11/2007    Smokeless tobacco: Never Used    Alcohol use No    Drug use: Yes     Types: Hydrocodone    Sexual activity: Not on file     Other Topics Concern    Not on file     Social History Narrative    No narrative on file       ROS:  Constitution: Negative for chills, fever, and sweats. Negative for unexplained weight loss.  HENT: Negative for headaches and blurry vision.   Cardiovascular:  "Negative for chest pain, irregular heartbeat, leg swelling and palpitations.   Respiratory: Negative for cough and shortness of breath.   Gastrointestinal: Negative for abdominal pain, heartburn, nausea and vomiting.   Genitourinary: Negative for bladder incontinence and dysuria.   Musculoskeletal: Negative for systemic arthritis, muscle weakness and myalgias.   Neurological: Negative for numbness.   Psychiatric/Behavioral: Negative for depression.  Endocrine: Negative for polyuria.   Hematologic/Lymphatic: Negative for bleeding disorders.   Skin: Negative for poor wound healing.        PHYSICAL EXAM:  Ht 5' 11" (1.803 m)   Wt 87.1 kg (192 lb)   BMI 26.78 kg/m²   Luigi Murillo is a well developed, well nourished male in no acute distress. Physical examination of the left knee evaluated the following:    Gait and Alignment  Inspection for ecchymosis, swelling, atrophy, or deformity  Inspection for intra-articular and/or bursal effusions  Tenderness to palpation over the bony and soft tissue structures around the knee  Range of Motion and presence of extensor lag/contractures  Sensation and motor strength  Varus/valgus or anterior/posterior/rotatory instability  Flexion pinch and Goran's Tests  Patellar alignment/tracking/pain to palpation  Vascular exam to include skin temperature/color/capillary refill    Remarkable findings included:  Normal alignment.  No ecchymosis, swelling or effusion.  There is lateral joint line tenderness.  Range of motion 0-130° of flexion.  No instability on exam.  Action pinch and Goran's are equivocal and referrable to lateral compartment        IMAGING:   X-rays a left knee are personally reviewed.  No acute fractures or dislocations are seen.  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  As Luigi is had symptoms now for a few months, I recommend an MRI to rule out a lateral meniscus tear.  Follow-up after MRI has been performed discussed results and further      This note was dictated using voice recognition software. Please excuse any grammatical or typographical errors.

## 2018-05-03 ENCOUNTER — TELEPHONE (OUTPATIENT)
Dept: PAIN MEDICINE | Facility: CLINIC | Age: 68
End: 2018-05-03

## 2018-05-03 ENCOUNTER — HOSPITAL ENCOUNTER (OUTPATIENT)
Dept: RADIOLOGY | Facility: HOSPITAL | Age: 68
Discharge: HOME | End: 2018-05-03
Attending: ORTHOPAEDIC SURGERY
Payer: MEDICARE

## 2018-05-03 DIAGNOSIS — M25.562 ACUTE PAIN OF LEFT KNEE: ICD-10-CM

## 2018-05-03 PROCEDURE — 73721 MRI JNT OF LWR EXTRE W/O DYE: CPT | Mod: TC,PO,LT

## 2018-05-03 PROCEDURE — 73721 MRI JNT OF LWR EXTRE W/O DYE: CPT | Mod: 26,LT,, | Performed by: RADIOLOGY

## 2018-05-03 NOTE — TELEPHONE ENCOUNTER
Spoke with the patient and he wanted to know if he can come off the patches during the summer months. 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 He uses Tegaderm to help but at the end of the day, this comes off. Wants to know if he can come off the patches and use oral medication in the place of that in the summer and switch back to the patches in the fall when it is cooler. Please advise.

## 2018-05-03 NOTE — TELEPHONE ENCOUNTER
----- Message from Hunter Peraza sent at 5/3/2018 12:12 PM CDT -----  Contact: pt   States he's calling rg wanting to speak with  and no other information given and can be reached at [phone]//ken/[email or web address]

## 2018-05-07 ENCOUNTER — OFFICE VISIT (OUTPATIENT)
Dept: ORTHOPEDICS | Facility: CLINIC | Age: 68
End: 2018-05-07
Payer: MEDICARE

## 2018-05-07 VITALS — WEIGHT: 192 LBS | HEIGHT: 71 IN | BODY MASS INDEX: 26.88 KG/M2

## 2018-05-07 DIAGNOSIS — S83.242D OTHER TEAR OF MEDIAL MENISCUS OF LEFT KNEE AS CURRENT INJURY, SUBSEQUENT ENCOUNTER: Primary | ICD-10-CM

## 2018-05-07 PROCEDURE — 99213 OFFICE O/P EST LOW 20 MIN: CPT | Mod: PBBFAC,PN | Performed by: ORTHOPAEDIC SURGERY

## 2018-05-07 PROCEDURE — 99999 PR PBB SHADOW E&M-EST. PATIENT-LVL III: CPT | Mod: PBBFAC,,, | Performed by: ORTHOPAEDIC SURGERY

## 2018-05-07 PROCEDURE — 99214 OFFICE O/P EST MOD 30 MIN: CPT | Mod: S$PBB,,, | Performed by: ORTHOPAEDIC SURGERY

## 2018-05-07 NOTE — PROGRESS NOTES
"DATE: 5/7/2018  PATIENT: Luigi Murillo    Attending Physician: Terrell Herman M.D.    HISTORY:  Luigi Murillo is a 67 y.o. male who returns for follow up evaluation of  his left knee.  He undergo an MRI was read out by the radiologist as chondromalacia but no tear.  She still reports his symptoms are unchanged.  His most concerning when he gets in the shower and Hyperflex his knee.  He notes posterior pain.  He presents discuss his MRI results and further treatment recommendations.  Pain is reported at 5/10 today.    PMH/PSH/FamHx/SocHx:  Reviewed and unchanged from prior visit    ROS:  Constitution: Negative for chills, fever, and sweats. Negative for unexplained weight loss.  HENT: Negative for headaches and blurry vision.   Cardiovascular: Negative for chest pain, irregular heartbeat, leg swelling and palpitations.   Respiratory: Negative for cough and shortness of breath.   Gastrointestinal: Negative for abdominal pain, heartburn, nausea and vomiting.   Genitourinary: Negative for bladder incontinence and dysuria.   Musculoskeletal: Negative for systemic arthritis, joint swelling, muscle weakness and myalgias.   Neurological: Negative for numbness.   Psychiatric/Behavioral: Negative for depression.   Endocrine: Negative for polyuria.   Hematologic/Lymphatic: Negative for bleeding disorders.  Skin: Negative for poor wound healing.       EXAM:  Ht 5' 11" (1.803 m)   Wt 87.1 kg (192 lb)   BMI 26.78 kg/m²   Luigi Murillo is a well developed, well nourished male in no acute distress. Physical examination of the left knee evaluated the following:     Gait and Alignment  Inspection for ecchymosis, swelling, atrophy, or deformity  Inspection for intra-articular and/or bursal effusions  Tenderness to palpation over the bony and soft tissue structures around the knee  Range of Motion and presence of extensor lag/contractures  Sensation and motor strength  Varus/valgus or anterior/posterior/rotatory " instability  Flexion pinch and Goran's Tests  Patellar alignment/tracking/pain to palpation  Vascular exam to include skin temperature/color/capillary refill     Remarkable findings included:  Normal alignment.  No ecchymosis, swelling or effusion.  There is lateral joint line tenderness.  Range of motion 0-130° of flexion.  No instability on exam.  Action pinch and Goran's are equivocal and referrable to lateral compartment       IMAGING:   MRI is personally reviewed and compared to the radiologist's report.  To my eye there does appear to be a small horizontal medial meniscus tear.  Chondromalacia also noted    ASSESSMENT:  Possible medial meniscus tear left knee    PLAN:  The implications of the patient's evolution of symptoms and findings were explained to the patient in detail.I went over the MRI films with the patient's shoulder my findings as compared to the radiologist.  Treatment options discussed included observation, cortisone injection, and arthroscopy.  Risks and benefits and pros and cons of each were explained in detail. All questions answered and the patient wishes to observe his symptoms at the present time.  Should his symptoms worsen, he'll return for consideration of injection versus arthroscopy.  Follow-up as needed..          This note was dictated using voice recognition software. Please excuse any grammatical or typographical errors.

## 2018-05-11 RX ORDER — ATORVASTATIN CALCIUM 40 MG/1
TABLET, FILM COATED ORAL
Qty: 90 TABLET | Refills: 3 | Status: SHIPPED | OUTPATIENT
Start: 2018-05-11 | End: 2019-05-06 | Stop reason: SDUPTHER

## 2018-06-07 NOTE — TELEPHONE ENCOUNTER
Spoke with the patient's wife and she stated that the patches are on manufacture backorder and not sure what they need to do. The pharmacy has reordered and hoping it will come in tomorrow but they are not sure. The patient will be leaving on Sunday and not certain what to do. They are looking for suggestions.

## 2018-06-07 NOTE — TELEPHONE ENCOUNTER
----- Message from Peri Bean sent at 6/7/2018 12:29 PM CDT -----  Contact: Patient's wife, Ale  Patient's wife is calling to speak with Jocelyne regarding patient's patches and the pharmacy told them that the patches have been on backorder and she would like to speak with someone regarding this.  Call Back#[phone]  Thanks     [place] [facility] [place]  [address] [facility]  [address] [address] [place] [address]  Phone: [phone] Fax: [phone]

## 2018-06-08 ENCOUNTER — TELEPHONE (OUTPATIENT)
Dept: PAIN MEDICINE | Facility: CLINIC | Age: 68
End: 2018-06-08

## 2018-06-08 RX ORDER — BUPRENORPHINE 20 UG/H
1 PATCH TRANSDERMAL
Qty: 4 PATCH | Refills: 0 | Status: SHIPPED | OUTPATIENT
Start: 2018-06-08 | End: 2018-07-06

## 2018-06-08 NOTE — TELEPHONE ENCOUNTER
Spoke with our [facility] pharmacy and they have 1 box of Butran patches on hand. Will need a new RX sent to our pharmacy so they can  today.

## 2018-07-23 ENCOUNTER — OFFICE VISIT (OUTPATIENT)
Dept: PAIN MEDICINE | Facility: CLINIC | Age: 68
End: 2018-07-23
Payer: MEDICARE

## 2018-07-23 VITALS
OXYGEN SATURATION: 99 % | DIASTOLIC BLOOD PRESSURE: 69 MMHG | RESPIRATION RATE: 18 BRPM | SYSTOLIC BLOOD PRESSURE: 132 MMHG | BODY MASS INDEX: 25.78 KG/M2 | WEIGHT: 184.88 LBS | HEART RATE: 66 BPM | TEMPERATURE: 97 F

## 2018-07-23 DIAGNOSIS — G89.29 CHRONIC PAIN OF LEFT KNEE: ICD-10-CM

## 2018-07-23 DIAGNOSIS — M25.561 CHRONIC PAIN OF RIGHT KNEE: ICD-10-CM

## 2018-07-23 DIAGNOSIS — G89.29 CHRONIC PAIN OF RIGHT KNEE: ICD-10-CM

## 2018-07-23 DIAGNOSIS — G89.29 CHRONIC RIGHT SHOULDER PAIN: ICD-10-CM

## 2018-07-23 DIAGNOSIS — M25.562 CHRONIC PAIN OF LEFT KNEE: ICD-10-CM

## 2018-07-23 DIAGNOSIS — Z79.891 OPIOID CONTRACT EXISTS: ICD-10-CM

## 2018-07-23 DIAGNOSIS — M25.511 CHRONIC RIGHT SHOULDER PAIN: ICD-10-CM

## 2018-07-23 DIAGNOSIS — G89.4 CHRONIC PAIN SYNDROME: Primary | ICD-10-CM

## 2018-07-23 PROCEDURE — 99213 OFFICE O/P EST LOW 20 MIN: CPT | Mod: S$PBB,,, | Performed by: PHYSICIAN ASSISTANT

## 2018-07-23 PROCEDURE — 99214 OFFICE O/P EST MOD 30 MIN: CPT | Mod: PBBFAC,PN | Performed by: PHYSICIAN ASSISTANT

## 2018-07-23 PROCEDURE — 99999 PR PBB SHADOW E&M-EST. PATIENT-LVL IV: CPT | Mod: PBBFAC,,, | Performed by: PHYSICIAN ASSISTANT

## 2018-07-23 RX ORDER — MORPHINE SULFATE 30 MG/1
30 TABLET, FILM COATED, EXTENDED RELEASE ORAL 2 TIMES DAILY
Qty: 60 TABLET | Refills: 0 | Status: SHIPPED | OUTPATIENT
Start: 2018-07-23 | End: 2018-08-17 | Stop reason: SDUPTHER

## 2018-07-23 RX ORDER — BUPROPION HYDROCHLORIDE 300 MG/1
TABLET ORAL
Qty: 90 TABLET | Refills: 0 | Status: SHIPPED | OUTPATIENT
Start: 2018-07-23 | End: 2018-10-20 | Stop reason: SDUPTHER

## 2018-07-23 NOTE — PROGRESS NOTES
This note was completed with dictation software and grammatical errors may exist.    CC:Knee pain, shoulder pain    HPI: The patient is a 67-year-old man with history of CAD and stent, hypertension, degenerative joint disease in the right shoulder and knee who presents in referral from Dr. Traore for help with pain.  He returns in follow-up today with right shoulder pain and bilateral knee pain.  He is going to see Dr. Villalobos in [place] for his left knee.  He is having trouble during the summer months keeping his Butrans patches on.  He is having to use Tegaderm to keep him on but they still falloff and also are causing skin irritation with the heat.  He is requesting to switch to an oral medication for the next few months.    Pain intervention history:  He is status post right L3 and L4 transforaminal epidural steroid injections on 3/14/14 with 85% relief at first, now reporting what sounds like about 25% relief.  He is status post right L3 and L4 transforaminal epidural steroid injection on 6/4/14 with 0% relief.  He is status post right knee geniculate nerve block on 11/4/16 with 0% relief so radiofrequency ablation was not scheduled.    Urine drug screen on 1/22/14 was positive for buprenorphine and tramadol but negative for hydrocodone which he was apparently taking.    Repeat drug screen on 2/20/14 again positive for buprenorphine and tramadol but negative for hydrocodone.  5/20/14 and 6/25/14 urine drug screens both consistent.    ROS:He reports fatigability, chest pain at times, easy bruising, joint stiffness, joint swelling and back pain.  Balance of review of systems negative.    Medical, surgical, family and social history reviewed elsewhere in record.    Medications/Allergies: See med card    Vitals:    07/23/18 0801   BP: 132/69   Pulse: 66   Resp: 18   Temp: 97.4 °F (36.3 °C)   TempSrc: Oral   SpO2: 99%   Weight: 83.8 kg (184 lb 13.7 oz)   PainSc:   4   PainLoc: Back         Physical exam:  Gen:  A and O x3, pleasant, well-groomed  Skin: No rashes or obvious lesions  HEENT: PERRLA  CVS: Regular rate and rhythm, normal S1 and S2, no murmurs.  Resp: Clear to auscultation bilaterally, no wheezes or rales.  Abdomen: Soft, NT/ND, normal bowel sounds present.  Musculoskeletal:  No antalgic gait.      Neuro:  Upper extremities: 5/5 strength bilaterally   Lower extremities: 5/5 strength bilaterally  Reflexes: Brachioradialis 2+, Bicep 2+, Tricep 2+. Patellar 2+, Achilles 2+ bilaterally  Sensory: Intact and symmetrical to light touch and pinprick in C2-T1 dermatomes bilaterally.  Intact and symmetrical to light touch and pinprick in L2-S1 dermatomes bilaterally.    Lumbar spine:  Range of motion is mildly limited with extension and full with flexion with increased pain during each maneuver.  Oblique extension causes mild increased pain on the corresponding side.  Jamshid's test is negative bilaterally.  Straight leg raise is negative bilaterally.  Internal/external rotation of hip is negative bilaterally.  Myofascial exam: Mild tenderness to palpation to the bilateral lumbar paraspinous muscles.    Right knee: Mild crepitus on range of motion with pain on passive flexion and extension, mild tenderness to superior portion of the joint, lateral portion, no redness or erythema or swelling.    Left knee: Mild crepitus on range of motion with pain on passive flexion and extension, moderate tenderness lateral to the patella, no redness or erythema or swelling.    Right shoulder: Range of motion is full but painful with flexion, abduction and internal/external rotation.  Empty can test positive.  Moderate generalized tenderness to palpation to the anterior and lateral shoulder.  Mild tenderness to palpation to the right scapular region.    Imagin11 Xray right knee:   THERE IS SPURRING OF THE SUPERIOR PATELLAR FACET AND NARROWING OF THE PATELLOFEMORAL JOINT SPACE. NO JOINT EFFUSION OR ACUTE OSSEOUS ABNORMALITY IS  SEEN.    1/22/14 MRI lumbar spine  At the T12-L1 level, no significant disk bulge, central canal stenosis, or neural foraminal stenosis is noted. Mild ligamentous hypertrophy is noted  At the L1-L2 level, no significant disk bulge, central canal stenosis, or neural foraminal stenosis is noted. Mild ligamentous hypertrophy is noted   At the L2-L3 level, minimal disk bulging may be noted one to 2 mm. Facet arthropathy and ligamentous hypertrophy is noted. Minimal bilateral neural foraminal narrowing is noted. The central canal is approximately 11 mm and may be minimally narrowed.  At the L3-L4 level, broad-based disk bulging appears to be present of approximately 3 to 4 mm with an asymmetric component greater towards the right neuroforamen a 4 mm that may relate to protrusion. Facet arthropathy and ligamentous hypertrophy is noted. Mild central canal stenosis is noted to 9 mm. Mild to moderate right neuroforaminal narrowing appears to present with possible contact of the exiting nerve root on the right. Mild left neural foraminal narrowing is noted.  At the L4-L5 level the disk osteophyte protrusion appears to be present paracentric to the right of approximately 5 mm. Mild to moderate central canal narrowing is noted to 8 mm. Mild to moderate right nerve foraminal narrowing is noted with mild left neuroforaminal narrowing. Contact of the exiting right nerve root may be present. Increased T2 signal is noted suggestive of an annular tear paracentric to the right  At the L5-S1 level, ligamentous hypertrophy is noted in. No significant disk bulge, central canal stenosis, or neural foraminal stenosis is noted.      Assessment:  The patient is a 67-year-old man with history of CAD and stent, hypertension, degenerative joint disease in the right shoulder and knee who presents in referral from Dr. Traore for help with pain.   1. Chronic pain syndrome     2. Chronic pain of right knee     3. Chronic pain of left knee     4.  Chronic right shoulder pain     5. Opioid contract exists         Plan:  1.  I discussed this case with Dr. Giron and he has provided a prescription for morphine 30 mg twice a day in place of his Butrans patch.  He will call for his next 2 prescriptions.  I have reviewed the [place] Board of Pharmacy website and there are no abberancies.    2.  Follow-up in 3 months or sooner as needed.

## 2018-08-14 RX ORDER — MORPHINE SULFATE 30 MG/1
30 TABLET, FILM COATED, EXTENDED RELEASE ORAL 2 TIMES DAILY
Qty: 60 TABLET | Refills: 0 | OUTPATIENT
Start: 2018-08-14

## 2018-08-14 NOTE — TELEPHONE ENCOUNTER
Spoke with the patient and he is not having any side effects from the pills. He is not getting the pain coverage like he does with the patches but it is not unbearable. Will need refill on pain medication.

## 2018-08-14 NOTE — TELEPHONE ENCOUNTER
----- Message from Soraya Ocasio sent at 8/14/2018  2:15 PM CDT -----  Contact: [phone]  Patient is requesting a call back from the nurse stated his medication was changed, he's calling to give update.    Please call the patient upon request at phone number [phone].

## 2018-08-17 RX ORDER — MORPHINE SULFATE 30 MG/1
TABLET, FILM COATED, EXTENDED RELEASE ORAL
Qty: 60 TABLET | Refills: 0 | Status: SHIPPED | OUTPATIENT
Start: 2018-08-22 | End: 2018-09-18 | Stop reason: SDUPTHER

## 2018-08-17 NOTE — TELEPHONE ENCOUNTER
----- Message from Gisella Guevara sent at 8/17/2018 10:59 AM CDT -----  Contact: self  Type:  RX Refill Request    Who Called:  self  Refill or New Rx:  refill  RX Name and Strength:  morphine (MS CONTIN) 30 MG 12 hr tablet  How is the patient currently taking it? (ex. 1XDay):  2Xday  Is this a 30 day or 90 day RX:  30  Preferred Pharmacy with phone number:  [facility]  Local or Mail Order:  local  Ordering Provider:  Dr Giron  [facility] Call Back Number:  [phone]  Additional Information:  Patient is leaving for work in [place] on Monday. Please call patient when sent. Thanks!    [facility] [place] - [address] [facility].  [address] [address] [place] [address]  Phone: [phone] Fax: [phone]

## 2018-09-17 RX ORDER — LISINOPRIL 5 MG/1
TABLET ORAL
Qty: 90 TABLET | Refills: 0 | Status: SHIPPED | OUTPATIENT
Start: 2018-09-17 | End: 2018-12-16 | Stop reason: SDUPTHER

## 2018-09-18 RX ORDER — MORPHINE SULFATE 30 MG/1
30 TABLET, FILM COATED, EXTENDED RELEASE ORAL 2 TIMES DAILY
Qty: 60 TABLET | Refills: 0 | Status: SHIPPED | OUTPATIENT
Start: 2018-09-21 | End: 2018-09-19 | Stop reason: SDUPTHER

## 2018-09-18 NOTE — TELEPHONE ENCOUNTER
----- Message from Susie Marie sent at 9/18/2018 10:09 AM CDT -----  Type:  RX Refill Request    Who Called:  spouse- Ale Murillo  Refill or New Rx:  refill  RX Name and Strength:  morphine (MS CONTIN) 30 MG 12 hr tablet  How is the patient currently taking it? (ex. 1XDay):  Na  Is this a 30 day or 90 day RX:  30  Preferred Pharmacy with phone number:    [facility] - [place] - [address]  [address]  [place] [address]  Phone: [phone] Fax: [phone]    [facility] - [place] [address]  Phone: [phone] Fax: [phone]    [facility] - [place], [place] - [address] [place] [address]  Phone: [phone] Fax: [phone]  [facility] [place] - [address]  [address]  [place] [address]  Phone: [phone] Fax: [phone]  Local or Mail Order:  local  Ordering Provider:  Alejandro  Best Call Back Number:  [phone] (home)     Additional Information:  Stating the out of refills at the pharmacy, next appt is on 10/15/18

## 2018-09-19 ENCOUNTER — PATIENT MESSAGE (OUTPATIENT)
Dept: PAIN MEDICINE | Facility: CLINIC | Age: 68
End: 2018-09-19

## 2018-09-19 RX ORDER — MORPHINE SULFATE 30 MG/1
30 TABLET, FILM COATED, EXTENDED RELEASE ORAL 2 TIMES DAILY
Qty: 60 TABLET | Refills: 0 | Status: SHIPPED | OUTPATIENT
Start: 2018-09-21 | End: 2018-10-15

## 2018-10-02 ENCOUNTER — LAB VISIT (OUTPATIENT)
Dept: LAB | Facility: HOSPITAL | Age: 68
End: 2018-10-02
Attending: FAMILY MEDICINE
Payer: MEDICARE

## 2018-10-02 ENCOUNTER — OFFICE VISIT (OUTPATIENT)
Dept: FAMILY MEDICINE | Facility: CLINIC | Age: 68
End: 2018-10-02
Payer: MEDICARE

## 2018-10-02 VITALS
DIASTOLIC BLOOD PRESSURE: 59 MMHG | HEIGHT: 71 IN | HEART RATE: 67 BPM | BODY MASS INDEX: 26.04 KG/M2 | SYSTOLIC BLOOD PRESSURE: 103 MMHG | WEIGHT: 186 LBS | TEMPERATURE: 98 F

## 2018-10-02 DIAGNOSIS — Z12.5 ENCOUNTER FOR SCREENING FOR MALIGNANT NEOPLASM OF PROSTATE: ICD-10-CM

## 2018-10-02 DIAGNOSIS — N40.1 BENIGN PROSTATIC HYPERPLASIA WITH URINARY OBSTRUCTION: ICD-10-CM

## 2018-10-02 DIAGNOSIS — N13.8 BENIGN PROSTATIC HYPERPLASIA WITH URINARY OBSTRUCTION: ICD-10-CM

## 2018-10-02 DIAGNOSIS — I10 ESSENTIAL HYPERTENSION: ICD-10-CM

## 2018-10-02 DIAGNOSIS — Z00.00 ANNUAL PHYSICAL EXAM: Primary | ICD-10-CM

## 2018-10-02 DIAGNOSIS — Z00.00 ANNUAL PHYSICAL EXAM: ICD-10-CM

## 2018-10-02 DIAGNOSIS — F32.5 DEPRESSION, MAJOR, IN REMISSION: ICD-10-CM

## 2018-10-02 DIAGNOSIS — E78.5 HYPERLIPIDEMIA WITH TARGET LDL LESS THAN 100: ICD-10-CM

## 2018-10-02 DIAGNOSIS — I25.10 CAD IN NATIVE ARTERY: ICD-10-CM

## 2018-10-02 DIAGNOSIS — G89.4 CHRONIC PAIN SYNDROME: ICD-10-CM

## 2018-10-02 DIAGNOSIS — Z23 NEED FOR PROPHYLACTIC VACCINATION AND INOCULATION AGAINST INFLUENZA: ICD-10-CM

## 2018-10-02 LAB
ALBUMIN SERPL BCP-MCNC: 3.6 G/DL
ALP SERPL-CCNC: 76 U/L
ALT SERPL W/O P-5'-P-CCNC: 37 U/L
ANION GAP SERPL CALC-SCNC: 6 MMOL/L
AST SERPL-CCNC: 32 U/L
BASOPHILS # BLD AUTO: 0.03 K/UL
BASOPHILS NFR BLD: 0.5 %
BILIRUB SERPL-MCNC: 0.4 MG/DL
BUN SERPL-MCNC: 17 MG/DL
CALCIUM SERPL-MCNC: 8.9 MG/DL
CHLORIDE SERPL-SCNC: 104 MMOL/L
CHOLEST SERPL-MCNC: 169 MG/DL
CHOLEST/HDLC SERPL: 3.2 {RATIO}
CO2 SERPL-SCNC: 28 MMOL/L
COMPLEXED PSA SERPL-MCNC: 0.38 NG/ML
CREAT SERPL-MCNC: 1.3 MG/DL
DIFFERENTIAL METHOD: ABNORMAL
EOSINOPHIL # BLD AUTO: 0.2 K/UL
EOSINOPHIL NFR BLD: 2.7 %
ERYTHROCYTE [DISTWIDTH] IN BLOOD BY AUTOMATED COUNT: 12.4 %
EST. GFR  (AFRICAN AMERICAN): >60 ML/MIN/1.73 M^2
EST. GFR  (NON AFRICAN AMERICAN): 57.3 ML/MIN/1.73 M^2
GLUCOSE SERPL-MCNC: 92 MG/DL
HCT VFR BLD AUTO: 40.2 %
HDLC SERPL-MCNC: 53 MG/DL
HDLC SERPL: 31.4 %
HGB BLD-MCNC: 13.1 G/DL
IMM GRANULOCYTES # BLD AUTO: 0.04 K/UL
IMM GRANULOCYTES NFR BLD AUTO: 0.7 %
LDLC SERPL CALC-MCNC: 99.6 MG/DL
LYMPHOCYTES # BLD AUTO: 1.5 K/UL
LYMPHOCYTES NFR BLD: 26.4 %
MCH RBC QN AUTO: 31.1 PG
MCHC RBC AUTO-ENTMCNC: 32.6 G/DL
MCV RBC AUTO: 96 FL
MONOCYTES # BLD AUTO: 0.4 K/UL
MONOCYTES NFR BLD: 7.8 %
NEUTROPHILS # BLD AUTO: 3.4 K/UL
NEUTROPHILS NFR BLD: 61.9 %
NONHDLC SERPL-MCNC: 116 MG/DL
NRBC BLD-RTO: 0 /100 WBC
PLATELET # BLD AUTO: 167 K/UL
PMV BLD AUTO: 10.2 FL
POTASSIUM SERPL-SCNC: 4.7 MMOL/L
PROT SERPL-MCNC: 6.4 G/DL
RBC # BLD AUTO: 4.21 M/UL
SODIUM SERPL-SCNC: 138 MMOL/L
TRIGL SERPL-MCNC: 82 MG/DL
TSH SERPL DL<=0.005 MIU/L-ACNC: 1.18 UIU/ML
WBC # BLD AUTO: 5.5 K/UL

## 2018-10-02 PROCEDURE — 80061 LIPID PANEL: CPT

## 2018-10-02 PROCEDURE — 90662 IIV NO PRSV INCREASED AG IM: CPT | Mod: PBBFAC,PO

## 2018-10-02 PROCEDURE — 85025 COMPLETE CBC W/AUTO DIFF WBC: CPT

## 2018-10-02 PROCEDURE — 84153 ASSAY OF PSA TOTAL: CPT

## 2018-10-02 PROCEDURE — 99999 PR PBB SHADOW E&M-EST. PATIENT-LVL IV: CPT | Mod: PBBFAC,,, | Performed by: NURSE PRACTITIONER

## 2018-10-02 PROCEDURE — 36415 COLL VENOUS BLD VENIPUNCTURE: CPT | Mod: PO

## 2018-10-02 PROCEDURE — 99214 OFFICE O/P EST MOD 30 MIN: CPT | Mod: PBBFAC,PO | Performed by: NURSE PRACTITIONER

## 2018-10-02 PROCEDURE — 80053 COMPREHEN METABOLIC PANEL: CPT

## 2018-10-02 PROCEDURE — 99214 OFFICE O/P EST MOD 30 MIN: CPT | Mod: 25,S$PBB,, | Performed by: NURSE PRACTITIONER

## 2018-10-02 PROCEDURE — 84443 ASSAY THYROID STIM HORMONE: CPT

## 2018-10-02 RX ORDER — METOPROLOL SUCCINATE 25 MG/1
12.5 TABLET, EXTENDED RELEASE ORAL DAILY
Qty: 45 TABLET | Refills: 2 | Status: SHIPPED | OUTPATIENT
Start: 2018-10-02 | End: 2019-06-06 | Stop reason: SDUPTHER

## 2018-10-02 NOTE — PROGRESS NOTES
Subjective:       Patient ID: Luigi Murillo is a 67 y.o. male.    Chief Complaint: Annual Exam  Pt in today for annual exam. HTN, hyperlipidemia managed with medication. Pt sees pain management every 3 m for chronic pain. BPH managed with medication. Depression well controlled with medication; denies SI/HI. Labs due. Declines colonoscopy. Pt has no other complaints today.   Past Medical History:   Diagnosis Date    Anticoagulant long-term use     ASA 81mg, effient    BPH (benign prostatic hypertrophy) with urinary obstruction     CAD (coronary artery disease)     2 stents    Chronic pain     DDD (degenerative disc disease), lumbar     Depression     Depression, major, in remission     DJD (degenerative joint disease) of knee     Hyperlipidemia LDL goal < 100     Hypertension     MRSA cellulitis     S/P coronary artery stent placement May and Yarely 2011    x2     Social History     Socioeconomic History    Marital status:      Spouse name: Not on file    Number of children: Not on file    Years of education: Not on file    Highest education level: Not on file   Social Needs    Financial resource strain: Not on file    Food insecurity - worry: Not on file    Food insecurity - inability: Not on file    Transportation needs - medical: Not on file    Transportation needs - non-medical: Not on file   Occupational History    Not on file   Tobacco Use    Smoking status: Former Smoker     Packs/day: 1.00     Years: 25.00     Pack years: 25.00     Last attempt to quit: 2007     Years since quittin.4    Smokeless tobacco: Never Used   Substance and Sexual Activity    Alcohol use: No    Drug use: Yes     Types: Hydrocodone    Sexual activity: Not on file   Other Topics Concern    Not on file   Social History Narrative    Not on file     Past Surgical History:   Procedure Laterality Date    BLOCK-NERVE Knee Geniculate Right 2016    [facility]  OR    CORONARY ANGIOPLASTY      2 Stents placed a few year ago    CORONARY STENT PLACEMENT      x 2    Epidural steroid injection      Pain management    GRISELDA-TRANSFORAMINAL Right 6/4/2014    [facility] OR    GRISELDA-TRANSFORAMINAL Right 3/14/2014    [facility] OR    INJURY      KNEE SURGERY      Right, x2    MANDIBLE FRACTURE SURGERY      accident during  service    RELEASE-FINGER-TRIGGER Right 9/14/2017    [facility] OR    ROTATOR CUFF REPAIR      right times 2    VASECTOMY         HPI  Review of Systems   Constitutional: Negative.  Negative for activity change and unexpected weight change.   HENT: Negative.  Negative for hearing loss, rhinorrhea and trouble swallowing.    Eyes: Negative.  Negative for discharge and visual disturbance.   Respiratory: Negative.  Negative for chest tightness and wheezing.    Cardiovascular: Negative.  Negative for chest pain and palpitations.   Gastrointestinal: Negative.  Negative for blood in stool, constipation, diarrhea and vomiting.   Endocrine: Negative.  Negative for polydipsia and polyuria.   Genitourinary: Negative.  Negative for difficulty urinating, hematuria and urgency.   Musculoskeletal: Negative.  Negative for neck pain.   Skin: Negative.    Allergic/Immunologic: Negative.    Neurological: Negative.  Negative for weakness and headaches.   Psychiatric/Behavioral: Negative.  Negative for confusion and dysphoric mood.       Objective:      Physical Exam   Constitutional: He is oriented to person, place, and time. He appears well-developed and well-nourished.   HENT:   Head: Normocephalic.   Right Ear: External ear normal.   Left Ear: External ear normal.   Nose: Nose normal.   Mouth/Throat: Oropharynx is clear and moist.   Eyes: Conjunctivae are normal. Pupils are equal, round, and reactive to light.   Neck: Normal range of motion. Neck supple.   Cardiovascular:  Normal rate, regular rhythm and normal heart sounds.   Pulmonary/Chest: Effort normal and breath sounds normal.   Abdominal: Soft. Bowel sounds are normal.   Genitourinary: Rectum normal. Prostate is enlarged. Prostate is not tender.   Musculoskeletal: Normal range of motion.   Neurological: He is alert and oriented to person, place, and time.   Skin: Skin is warm and dry. Capillary refill takes 2 to 3 seconds.   Psychiatric: He has a normal mood and affect. His behavior is normal. Judgment and thought content normal.   Nursing note and vitals reviewed.      Assessment:       1. Annual physical exam    2. Essential hypertension    3. Hyperlipidemia with target LDL less than 100    4. Chronic pain syndrome    5. Benign prostatic hyperplasia with urinary obstruction    6. CAD in native artery    7. Encounter for screening for malignant neoplasm of prostate     8. Need for prophylactic vaccination and inoculation against influenza    9. Depression, major, in remission        Plan:           Luigi was seen today for annual exam.    Diagnoses and all orders for this visit:    Annual physical exam  Essential hypertension  Hyperlipidemia with target LDL less than 100  Chronic pain syndrome  Benign prostatic hyperplasia with urinary obstruction  CAD in native artery  Encounter for screening for malignant neoplasm of prostate   Need for prophylactic vaccination and inoculation against influenza  Depression, major, in remission  -     Flu Vaccine - High Dose (PF) (65+)  -     Comprehensive metabolic panel; Future  -     CBC auto differential; Future  -     Lipid panel; Future  -     PSA, Screening; Future  -     TSH; Future  -     US Abdominal Aorta; Future  -     metoprolol succinate (TOPROL-XL) 25 MG 24 hr tablet; Take 0.5 tablets (12.5 mg total) by mouth once daily.

## 2018-10-15 ENCOUNTER — OFFICE VISIT (OUTPATIENT)
Dept: FAMILY MEDICINE | Facility: CLINIC | Age: 68
End: 2018-10-15
Payer: MEDICARE

## 2018-10-15 ENCOUNTER — OFFICE VISIT (OUTPATIENT)
Dept: PAIN MEDICINE | Facility: CLINIC | Age: 68
End: 2018-10-15
Payer: MEDICARE

## 2018-10-15 VITALS
DIASTOLIC BLOOD PRESSURE: 60 MMHG | BODY MASS INDEX: 26.04 KG/M2 | HEIGHT: 71 IN | HEART RATE: 60 BPM | TEMPERATURE: 98 F | SYSTOLIC BLOOD PRESSURE: 99 MMHG | WEIGHT: 186 LBS

## 2018-10-15 VITALS
SYSTOLIC BLOOD PRESSURE: 121 MMHG | DIASTOLIC BLOOD PRESSURE: 74 MMHG | TEMPERATURE: 98 F | HEART RATE: 84 BPM | BODY MASS INDEX: 25.49 KG/M2 | RESPIRATION RATE: 18 BRPM | WEIGHT: 182.75 LBS

## 2018-10-15 DIAGNOSIS — M25.561 CHRONIC PAIN OF RIGHT KNEE: ICD-10-CM

## 2018-10-15 DIAGNOSIS — Z79.891 OPIOID CONTRACT EXISTS: ICD-10-CM

## 2018-10-15 DIAGNOSIS — S50.811D: Primary | ICD-10-CM

## 2018-10-15 DIAGNOSIS — G89.29 CHRONIC RIGHT SHOULDER PAIN: ICD-10-CM

## 2018-10-15 DIAGNOSIS — G89.29 CHRONIC PAIN OF RIGHT KNEE: ICD-10-CM

## 2018-10-15 DIAGNOSIS — G89.29 CHRONIC PAIN OF LEFT KNEE: Primary | ICD-10-CM

## 2018-10-15 DIAGNOSIS — M25.562 CHRONIC PAIN OF LEFT KNEE: Primary | ICD-10-CM

## 2018-10-15 DIAGNOSIS — M51.36 DDD (DEGENERATIVE DISC DISEASE), LUMBAR: ICD-10-CM

## 2018-10-15 DIAGNOSIS — M25.511 CHRONIC RIGHT SHOULDER PAIN: ICD-10-CM

## 2018-10-15 PROCEDURE — 99214 OFFICE O/P EST MOD 30 MIN: CPT | Mod: S$PBB,,, | Performed by: PHYSICIAN ASSISTANT

## 2018-10-15 PROCEDURE — 99213 OFFICE O/P EST LOW 20 MIN: CPT | Mod: S$PBB,,, | Performed by: NURSE PRACTITIONER

## 2018-10-15 PROCEDURE — 99214 OFFICE O/P EST MOD 30 MIN: CPT | Mod: PBBFAC,PN | Performed by: PHYSICIAN ASSISTANT

## 2018-10-15 PROCEDURE — 99999 PR PBB SHADOW E&M-EST. PATIENT-LVL IV: CPT | Mod: PBBFAC,,, | Performed by: PHYSICIAN ASSISTANT

## 2018-10-15 PROCEDURE — 90471 IMMUNIZATION ADMIN: CPT | Mod: PBBFAC,PO

## 2018-10-15 PROCEDURE — 99214 OFFICE O/P EST MOD 30 MIN: CPT | Mod: PBBFAC,27,PO,25 | Performed by: NURSE PRACTITIONER

## 2018-10-15 PROCEDURE — 99999 PR PBB SHADOW E&M-EST. PATIENT-LVL IV: CPT | Mod: PBBFAC,,, | Performed by: NURSE PRACTITIONER

## 2018-10-15 RX ORDER — BUPRENORPHINE 20 UG/H
1 PATCH, EXTENDED RELEASE TRANSDERMAL
Qty: 4 PATCH | Refills: 0 | Status: SHIPPED | OUTPATIENT
Start: 2018-10-19 | End: 2018-11-16

## 2018-10-15 RX ORDER — BUPRENORPHINE 20 UG/H
1 PATCH, EXTENDED RELEASE TRANSDERMAL
Qty: 4 PATCH | Refills: 0 | Status: SHIPPED | OUTPATIENT
Start: 2018-11-16 | End: 2018-12-14

## 2018-10-15 RX ORDER — BUPRENORPHINE 20 UG/H
1 PATCH, EXTENDED RELEASE TRANSDERMAL
Qty: 4 PATCH | Refills: 0 | Status: SHIPPED | OUTPATIENT
Start: 2018-12-14 | End: 2019-01-07 | Stop reason: SDUPTHER

## 2018-10-15 NOTE — PROGRESS NOTES
Subjective:       Patient ID: Luigi Murillo is a 67 y.o. male.    Chief Complaint: Animal Bite (scratch right arm)  Pt in today for right arm abrasion. Pt states that he was scratched by his dog on yesterday; has been treating with neosporin and cleaning with mild soap and water. Denies any drainage, increased warmth to touch, pain. Pt requests TDAP. Pt states no allergic to latex; informed tdap in clinic not latex free; risks discussed. Pt requests non-latex free Tdap. He has no other complaints today.   Past Medical History:   Diagnosis Date    Anticoagulant long-term use     ASA 81mg, effient    BPH (benign prostatic hypertrophy) with urinary obstruction     CAD (coronary artery disease)     2 stents    Chronic pain     DDD (degenerative disc disease), lumbar     Depression     Depression, major, in remission     DJD (degenerative joint disease) of knee     Hyperlipidemia LDL goal < 100     Hypertension     MRSA cellulitis     S/P coronary artery stent placement May and Yarely 2011    x2     Social History     Socioeconomic History    Marital status:      Spouse name: Not on file    Number of children: Not on file    Years of education: Not on file    Highest education level: Not on file   Social Needs    Financial resource strain: Not on file    Food insecurity - worry: Not on file    Food insecurity - inability: Not on file    Transportation needs - medical: Not on file    Transportation needs - non-medical: Not on file   Occupational History    Not on file   Tobacco Use    Smoking status: Former Smoker     Packs/day: 1.00     Years: 25.00     Pack years: 25.00     Last attempt to quit: 2007     Years since quittin.4    Smokeless tobacco: Never Used   Substance and Sexual Activity    Alcohol use: No    Drug use: Yes     Types: Hydrocodone    Sexual activity: Not on file   Other Topics Concern    Not on file   Social History Narrative    Not on file     Past  Surgical History:   Procedure Laterality Date    BLOCK-NERVE Knee Geniculate Right 11/4/2016    [facility] OR    CORONARY ANGIOPLASTY      2 Stents placed a few year ago    CORONARY STENT PLACEMENT      x 2    Epidural steroid injection      Pain management    GRISELDA-TRANSFORAMINAL Right 6/4/2014    [facility] OR    GRISELDA-TRANSFORAMINAL Right 3/14/2014    [facility] at [facility] OR    INJURY      KNEE SURGERY      Right, x2    MANDIBLE FRACTURE SURGERY      accident during  service    RELEASE-FINGER-TRIGGER Right 9/14/2017    [facility] OR    ROTATOR CUFF REPAIR      right times 2    VASECTOMY         HPI  Review of Systems   Constitutional: Negative.    HENT: Negative.    Eyes: Negative.    Respiratory: Negative.    Cardiovascular: Negative.    Gastrointestinal: Negative.    Endocrine: Negative.    Genitourinary: Negative.    Musculoskeletal: Negative.    Skin:        Scratch to right arm   Allergic/Immunologic: Negative.    Neurological: Negative.    Psychiatric/Behavioral: Negative.        Objective:      Physical Exam   Constitutional: He is oriented to person, place, and time. He appears well-developed and well-nourished.   HENT:   Head: Normocephalic.   Right Ear: External ear normal.   Left Ear: External ear normal.   Nose: Nose normal.   Mouth/Throat: Oropharynx is clear and moist.   Eyes: Conjunctivae are normal. Pupils are equal, round, and reactive to light.   Neck: Normal range of motion. Neck supple.   Cardiovascular: Normal rate, regular rhythm and normal heart sounds.   Pulmonary/Chest: Effort normal and breath sounds normal.   Abdominal: Soft. Bowel sounds are normal.   Musculoskeletal: Normal range of motion.   Neurological: He is alert and oriented to person, place, and time.   Skin: Skin is warm and dry. Capillary refill takes 2 to 3 seconds.        Psychiatric: He has  a normal mood and affect. His behavior is normal. Judgment and thought content normal.   Nursing note and vitals reviewed.      Assessment:       1. Scratch of right forearm, subsequent encounter        Plan:           Luigi was seen today for animal bite.    Diagnoses and all orders for this visit:    Scratch of right forearm, subsequent encounter  -     (In Office Administered) Tdap Vaccine  Keep area clean and dry  Clean with mild soap and water  Neosporin OTC as directed  Report to ER immediately if symptoms worsen

## 2018-10-15 NOTE — PROGRESS NOTES
This note was completed with dictation software and grammatical errors may exist.    CC:Knee pain, shoulder pain    HPI: The patient is a 67-year-old man with history of CAD and stent, hypertension, degenerative joint disease in the right shoulder and knee who presents in referral from Dr. Traore for help with pain.  He returns in follow-up today with multiple pain complaints.  He has now seen 3 doctors for his left knee but states that he has only received suggestions for operating on his right knee.  Currently his left knee is bothering him more.  His low back pain is minimal at this time but he continues to have right shoulder pain as well. During last visit, he was switched from Butrans to oral morphine because his patches were falling off due to sweat in the heat while at work.  Now that is calling down, he would like to switch back.  He is also asking for recommendations for another opinion regarding his left knee.    Pain intervention history:  He is status post right L3 and L4 transforaminal epidural steroid injections on 3/14/14 with 85% relief at first, now reporting what sounds like about 25% relief.  He is status post right L3 and L4 transforaminal epidural steroid injection on 6/4/14 with 0% relief.  He is status post right knee geniculate nerve block on 11/4/16 with 0% relief so radiofrequency ablation was not scheduled.    Urine drug screen on 1/22/14 was positive for buprenorphine and tramadol but negative for hydrocodone which he was apparently taking.    Repeat drug screen on 2/20/14 again positive for buprenorphine and tramadol but negative for hydrocodone.  5/20/14 and 6/25/14 urine drug screens both consistent.    ROS:He reports fatigability, chest pain at times, easy bruising, joint stiffness, joint swelling and back pain.  Balance of review of systems negative.    Medical, surgical, family and social history reviewed elsewhere in record.    Medications/Allergies: See med card    Vitals:     10/15/18 0914   BP: 121/74   Pulse: 84   Resp: 18   Temp: 97.6 °F (36.4 °C)   TempSrc: Oral   Weight: 82.9 kg (182 lb 12.2 oz)   PainSc:   4   PainLoc: Back         Physical exam:  Gen: A and O x3, pleasant, well-groomed  Skin: No rashes or obvious lesions  HEENT: PERRLA  CVS: Regular rate and rhythm, normal S1 and S2, no murmurs.  Resp: Clear to auscultation bilaterally, no wheezes or rales.  Abdomen: Soft, NT/ND, normal bowel sounds present.  Musculoskeletal:  No antalgic gait.      Neuro:  Upper extremities: 5/5 strength bilaterally   Lower extremities: 5/5 strength bilaterally  Reflexes: Brachioradialis 2+, Bicep 2+, Tricep 2+. Patellar 2+, Achilles 2+ bilaterally  Sensory: Intact and symmetrical to light touch and pinprick in C2-T1 dermatomes bilaterally.  Intact and symmetrical to light touch and pinprick in L2-S1 dermatomes bilaterally.    Lumbar spine:  Range of motion is mildly limited with extension and full with flexion with increased pain during each maneuver.  Oblique extension causes mild increased pain on the corresponding side.  Jamshid's test is negative bilaterally.  Straight leg raise is negative bilaterally.  Internal/external rotation of hip is negative bilaterally.  Myofascial exam: Mild tenderness to palpation to the bilateral lumbar paraspinous muscles.    Right knee: Mild crepitus on range of motion with pain on passive flexion and extension, mild tenderness to superior portion of the joint, lateral portion, no redness or erythema or swelling.    Left knee: Mild crepitus on range of motion with pain on passive flexion and extension, moderate tenderness lateral to the patella, no redness or erythema or swelling.  Severe tenderness to palpation to the posterior knee.    Right shoulder: Range of motion is full but painful with flexion, abduction and internal/external rotation.  Empty can test positive.  Moderate generalized tenderness to palpation to the anterior and lateral shoulder.  Mild  tenderness to palpation to the right scapular region.    Imagin11 Xray right knee:   THERE IS SPURRING OF THE SUPERIOR PATELLAR FACET AND NARROWING OF THE PATELLOFEMORAL JOINT SPACE. NO JOINT EFFUSION OR ACUTE OSSEOUS ABNORMALITY IS SEEN.    14 MRI lumbar spine  At the T12-L1 level, no significant disk bulge, central canal stenosis, or neural foraminal stenosis is noted. Mild ligamentous hypertrophy is noted  At the L1-L2 level, no significant disk bulge, central canal stenosis, or neural foraminal stenosis is noted. Mild ligamentous hypertrophy is noted   At the L2-L3 level, minimal disk bulging may be noted one to 2 mm. Facet arthropathy and ligamentous hypertrophy is noted. Minimal bilateral neural foraminal narrowing is noted. The central canal is approximately 11 mm and may be minimally narrowed.  At the L3-L4 level, broad-based disk bulging appears to be present of approximately 3 to 4 mm with an asymmetric component greater towards the right neuroforamen a 4 mm that may relate to protrusion. Facet arthropathy and ligamentous hypertrophy is noted. Mild central canal stenosis is noted to 9 mm. Mild to moderate right neuroforaminal narrowing appears to present with possible contact of the exiting nerve root on the right. Mild left neural foraminal narrowing is noted.  At the L4-L5 level the disk osteophyte protrusion appears to be present paracentric to the right of approximately 5 mm. Mild to moderate central canal narrowing is noted to 8 mm. Mild to moderate right nerve foraminal narrowing is noted with mild left neuroforaminal narrowing. Contact of the exiting right nerve root may be present. Increased T2 signal is noted suggestive of an annular tear paracentric to the right  At the L5-S1 level, ligamentous hypertrophy is noted in. No significant disk bulge, central canal stenosis, or neural foraminal stenosis is noted.      Assessment:  The patient is a 67-year-old man with history of CAD and  stent, hypertension, degenerative joint disease in the right shoulder and knee who presents in referral from Dr. Traore for help with pain.   1. Chronic pain of left knee  Ambulatory referral to Orthopedics   2. Chronic pain of right knee     3. Chronic right shoulder pain     4. DDD (degenerative disc disease), lumbar     5. Opioid contract exists         Plan:  1.  I placed a referral to Dr. Galvin to evaluate his severe left knee pain.  2.  Dr. Giron provided prescriptions for Butrans 20 micrograms/hour.  I have reviewed the [place] Board of Pharmacy website and there are no abberancies.    3.  Follow-up in 3 months or sooner as needed.    Greater than 50% of this 25 min visit was spent counseling the patient.

## 2018-10-15 NOTE — PATIENT INSTRUCTIONS
Keep area clean and dry  Clean wit mild soap and water  Neosporin OTC as directed  Report to ER immediately if symptoms worsen

## 2018-10-20 RX ORDER — BUPROPION HYDROCHLORIDE 300 MG/1
TABLET ORAL
Qty: 90 TABLET | Refills: 3 | Status: SHIPPED | OUTPATIENT
Start: 2018-10-20 | End: 2019-07-17 | Stop reason: SDUPTHER

## 2018-10-30 ENCOUNTER — TELEPHONE (OUTPATIENT)
Dept: PAIN MEDICINE | Facility: CLINIC | Age: 68
End: 2018-10-30

## 2018-10-30 NOTE — TELEPHONE ENCOUNTER
----- Message from Murali Bryan sent at 10/29/2018  4:35 PM CDT -----  Contact: patient  Luigi, [phone], or wife at [phone]. Calling in regards to referral to orthopedic surgeon. Says they haven't heard anything from that office. Please advise. Thanks.

## 2018-10-31 ENCOUNTER — TELEPHONE (OUTPATIENT)
Dept: PAIN MEDICINE | Facility: CLINIC | Age: 68
End: 2018-10-31

## 2018-10-31 NOTE — TELEPHONE ENCOUNTER
----- Message from Matthias Fisher sent at 10/31/2018 10:03 AM CDT -----  Type: Needs Medical Advice    Who Called:  Patients wife/sohan  Best Call Back Number: [phone]  Additional Information: patient needs information on new physician regarding his left knee and has yet to hear anything from office. Please call to advise

## 2018-11-14 ENCOUNTER — TELEPHONE (OUTPATIENT)
Dept: FAMILY MEDICINE | Facility: CLINIC | Age: 68
End: 2018-11-14

## 2018-11-14 NOTE — TELEPHONE ENCOUNTER
----- Message from Damian Tellez sent at 11/14/2018 11:53 AM CST -----  Contact: Luigi [phone]  Pt needs to be cleared to stop taking the blood thinner because he is having a scope 11/30.

## 2018-11-14 NOTE — TELEPHONE ENCOUNTER
Patient having a scope on his knee and they want him to stop his blood thinner, wife reports he is only on baby ASA, please advise.

## 2018-12-17 RX ORDER — LISINOPRIL 5 MG/1
TABLET ORAL
Qty: 90 TABLET | Refills: 3 | Status: SHIPPED | OUTPATIENT
Start: 2018-12-17 | End: 2019-11-15 | Stop reason: SDUPTHER

## 2019-01-02 ENCOUNTER — TELEPHONE (OUTPATIENT)
Dept: RADIOLOGY | Facility: HOSPITAL | Age: 69
End: 2019-01-02

## 2019-01-02 ENCOUNTER — PATIENT MESSAGE (OUTPATIENT)
Dept: PAIN MEDICINE | Facility: CLINIC | Age: 69
End: 2019-01-02

## 2019-01-07 ENCOUNTER — OFFICE VISIT (OUTPATIENT)
Dept: PAIN MEDICINE | Facility: CLINIC | Age: 69
End: 2019-01-07
Payer: MEDICARE

## 2019-01-07 VITALS
DIASTOLIC BLOOD PRESSURE: 73 MMHG | BODY MASS INDEX: 26.2 KG/M2 | HEART RATE: 74 BPM | SYSTOLIC BLOOD PRESSURE: 120 MMHG | WEIGHT: 187.81 LBS | RESPIRATION RATE: 18 BRPM | TEMPERATURE: 98 F

## 2019-01-07 DIAGNOSIS — Z79.891 OPIOID CONTRACT EXISTS: ICD-10-CM

## 2019-01-07 DIAGNOSIS — M51.36 DDD (DEGENERATIVE DISC DISEASE), LUMBAR: ICD-10-CM

## 2019-01-07 DIAGNOSIS — G89.29 CHRONIC PAIN OF RIGHT KNEE: ICD-10-CM

## 2019-01-07 DIAGNOSIS — G89.29 CHRONIC RIGHT SHOULDER PAIN: ICD-10-CM

## 2019-01-07 DIAGNOSIS — G89.29 CHRONIC PAIN OF LEFT KNEE: Primary | ICD-10-CM

## 2019-01-07 DIAGNOSIS — M25.562 CHRONIC PAIN OF LEFT KNEE: Primary | ICD-10-CM

## 2019-01-07 DIAGNOSIS — M25.561 CHRONIC PAIN OF RIGHT KNEE: ICD-10-CM

## 2019-01-07 DIAGNOSIS — M25.511 CHRONIC RIGHT SHOULDER PAIN: ICD-10-CM

## 2019-01-07 PROCEDURE — 99213 OFFICE O/P EST LOW 20 MIN: CPT | Mod: S$PBB,,, | Performed by: PHYSICIAN ASSISTANT

## 2019-01-07 PROCEDURE — 99214 OFFICE O/P EST MOD 30 MIN: CPT | Mod: PBBFAC,PN | Performed by: PHYSICIAN ASSISTANT

## 2019-01-07 PROCEDURE — 99999 PR PBB SHADOW E&M-EST. PATIENT-LVL IV: ICD-10-PCS | Mod: PBBFAC,,, | Performed by: PHYSICIAN ASSISTANT

## 2019-01-07 PROCEDURE — 99213 PR OFFICE/OUTPT VISIT, EST, LEVL III, 20-29 MIN: ICD-10-PCS | Mod: S$PBB,,, | Performed by: PHYSICIAN ASSISTANT

## 2019-01-07 PROCEDURE — 99999 PR PBB SHADOW E&M-EST. PATIENT-LVL IV: CPT | Mod: PBBFAC,,, | Performed by: PHYSICIAN ASSISTANT

## 2019-01-07 RX ORDER — BUPRENORPHINE 20 UG/H
1 PATCH, EXTENDED RELEASE TRANSDERMAL
Qty: 4 PATCH | Refills: 0 | Status: SHIPPED | OUTPATIENT
Start: 2019-03-08 | End: 2019-01-31

## 2019-01-07 RX ORDER — BUPRENORPHINE 20 UG/H
1 PATCH, EXTENDED RELEASE TRANSDERMAL
Qty: 4 PATCH | Refills: 0 | Status: SHIPPED | OUTPATIENT
Start: 2019-01-11 | End: 2019-01-31

## 2019-01-07 RX ORDER — BUPRENORPHINE 20 UG/H
1 PATCH, EXTENDED RELEASE TRANSDERMAL
Qty: 4 PATCH | Refills: 0 | Status: SHIPPED | OUTPATIENT
Start: 2019-02-08 | End: 2019-01-31

## 2019-01-14 ENCOUNTER — HOSPITAL ENCOUNTER (OUTPATIENT)
Dept: RADIOLOGY | Facility: HOSPITAL | Age: 69
Discharge: HOME | End: 2019-01-14
Attending: NURSE PRACTITIONER
Payer: MEDICARE

## 2019-01-14 DIAGNOSIS — I25.10 CAD IN NATIVE ARTERY: ICD-10-CM

## 2019-01-14 DIAGNOSIS — Z00.00 ANNUAL PHYSICAL EXAM: ICD-10-CM

## 2019-01-14 PROCEDURE — 76775 US ABDOMINAL AORTA: ICD-10-PCS | Mod: 26,,, | Performed by: RADIOLOGY

## 2019-01-14 PROCEDURE — 76775 US EXAM ABDO BACK WALL LIM: CPT | Mod: TC,PO

## 2019-01-14 PROCEDURE — 76775 US EXAM ABDO BACK WALL LIM: CPT | Mod: 26,,, | Performed by: RADIOLOGY

## 2019-01-31 ENCOUNTER — PATIENT MESSAGE (OUTPATIENT)
Dept: PAIN MEDICINE | Facility: CLINIC | Age: 69
End: 2019-01-31

## 2019-01-31 RX ORDER — MORPHINE SULFATE 30 MG/1
30 TABLET, FILM COATED, EXTENDED RELEASE ORAL 2 TIMES DAILY
Qty: 60 TABLET | Refills: 0 | Status: SHIPPED | OUTPATIENT
Start: 2019-01-31 | End: 2019-02-28 | Stop reason: SDUPTHER

## 2019-02-01 NOTE — TELEPHONE ENCOUNTER
It's stronger as far as opioids go, unfortunately all opioids work the same way whether its Butrans, morphine, hydrocodone, etc. They all bind the same receptors and you can calculate equivalent strength of each of them. Basically, as tolerance develops, increasing strengths of opioids is the only thing that will make a difference.

## 2019-02-13 RX ORDER — TAMSULOSIN HYDROCHLORIDE 0.4 MG/1
1 CAPSULE ORAL NIGHTLY
Qty: 90 CAPSULE | Refills: 2 | Status: SHIPPED | OUTPATIENT
Start: 2019-02-13 | End: 2019-10-18 | Stop reason: SDUPTHER

## 2019-02-13 NOTE — TELEPHONE ENCOUNTER
----- Message from Armen Goode sent at 2/13/2019  2:55 PM CST -----  Contact: pt   ..Type:  RX Refill Request    Who Called:  Wife   Refill or New Rx: Refill   RX Name and Strength: flomax .4 mg   How is the patient currently taking it? (ex. 1XDay): once a day   Is this a 30 day or 90 day RX: 90 days   Preferred Pharmacy with phone number: [email or web address]   Local or Mail Order: mail Order   Ordering Provider: Eugenie   Would the patient rather a call back or a response via [facility]?  Callback   Best Call Back Number ..[phone]    Additional Information:       [facility]  [address] [address]  Phone: [phone] Fax: [phone]

## 2019-02-25 RX ORDER — CYCLOBENZAPRINE HCL 10 MG
TABLET ORAL
Qty: 90 TABLET | Refills: 3 | Status: SHIPPED | OUTPATIENT
Start: 2019-02-25 | End: 2019-02-28 | Stop reason: SDUPTHER

## 2019-02-28 ENCOUNTER — PATIENT MESSAGE (OUTPATIENT)
Dept: PAIN MEDICINE | Facility: CLINIC | Age: 69
End: 2019-02-28

## 2019-02-28 RX ORDER — MORPHINE SULFATE 30 MG/1
30 TABLET, FILM COATED, EXTENDED RELEASE ORAL 2 TIMES DAILY
Qty: 60 TABLET | Refills: 0 | Status: SHIPPED | OUTPATIENT
Start: 2019-03-02 | End: 2019-03-25 | Stop reason: SDUPTHER

## 2019-02-28 RX ORDER — CYCLOBENZAPRINE HCL 10 MG
TABLET ORAL
Qty: 90 TABLET | Refills: 3 | Status: SHIPPED | OUTPATIENT
Start: 2019-02-28 | End: 2019-07-17 | Stop reason: SDUPTHER

## 2019-03-25 ENCOUNTER — OFFICE VISIT (OUTPATIENT)
Dept: PAIN MEDICINE | Facility: CLINIC | Age: 69
End: 2019-03-25
Payer: MEDICARE

## 2019-03-25 VITALS
RESPIRATION RATE: 18 BRPM | HEART RATE: 94 BPM | WEIGHT: 193.56 LBS | SYSTOLIC BLOOD PRESSURE: 136 MMHG | BODY MASS INDEX: 27 KG/M2 | DIASTOLIC BLOOD PRESSURE: 65 MMHG | OXYGEN SATURATION: 97 % | TEMPERATURE: 98 F

## 2019-03-25 DIAGNOSIS — G89.29 CHRONIC RIGHT SHOULDER PAIN: ICD-10-CM

## 2019-03-25 DIAGNOSIS — G89.29 CHRONIC PAIN OF RIGHT KNEE: Primary | ICD-10-CM

## 2019-03-25 DIAGNOSIS — M25.511 CHRONIC RIGHT SHOULDER PAIN: ICD-10-CM

## 2019-03-25 DIAGNOSIS — M51.36 DDD (DEGENERATIVE DISC DISEASE), LUMBAR: ICD-10-CM

## 2019-03-25 DIAGNOSIS — M25.561 CHRONIC PAIN OF RIGHT KNEE: Primary | ICD-10-CM

## 2019-03-25 DIAGNOSIS — Z79.891 OPIOID CONTRACT EXISTS: ICD-10-CM

## 2019-03-25 PROCEDURE — 99999 PR PBB SHADOW E&M-EST. PATIENT-LVL V: CPT | Mod: PBBFAC,,, | Performed by: PHYSICIAN ASSISTANT

## 2019-03-25 PROCEDURE — 99213 OFFICE O/P EST LOW 20 MIN: CPT | Mod: S$PBB,,, | Performed by: PHYSICIAN ASSISTANT

## 2019-03-25 PROCEDURE — 99213 PR OFFICE/OUTPT VISIT, EST, LEVL III, 20-29 MIN: ICD-10-PCS | Mod: S$PBB,,, | Performed by: PHYSICIAN ASSISTANT

## 2019-03-25 PROCEDURE — 99215 OFFICE O/P EST HI 40 MIN: CPT | Mod: PBBFAC,PN | Performed by: PHYSICIAN ASSISTANT

## 2019-03-25 PROCEDURE — 99999 PR PBB SHADOW E&M-EST. PATIENT-LVL V: ICD-10-PCS | Mod: PBBFAC,,, | Performed by: PHYSICIAN ASSISTANT

## 2019-03-25 RX ORDER — HYDROCODONE BITARTRATE AND ACETAMINOPHEN 10; 325 MG/1; MG/1
1 TABLET ORAL DAILY PRN
Qty: 30 TABLET | Refills: 0 | Status: SHIPPED | OUTPATIENT
Start: 2019-03-25 | End: 2019-04-22

## 2019-03-25 RX ORDER — HYDROCODONE BITARTRATE AND ACETAMINOPHEN 10; 325 MG/1; MG/1
1 TABLET ORAL DAILY PRN
Qty: 30 TABLET | Refills: 0 | Status: SHIPPED | OUTPATIENT
Start: 2019-04-24 | End: 2019-05-24

## 2019-03-25 RX ORDER — MORPHINE SULFATE 30 MG/1
30 TABLET, FILM COATED, EXTENDED RELEASE ORAL 2 TIMES DAILY
Qty: 60 TABLET | Refills: 0 | Status: SHIPPED | OUTPATIENT
Start: 2019-05-31 | End: 2019-06-10 | Stop reason: SDUPTHER

## 2019-03-25 RX ORDER — MORPHINE SULFATE 30 MG/1
30 TABLET, FILM COATED, EXTENDED RELEASE ORAL 2 TIMES DAILY
Qty: 60 TABLET | Refills: 0 | Status: SHIPPED | OUTPATIENT
Start: 2019-04-01 | End: 2019-05-01

## 2019-03-25 RX ORDER — HYDROCODONE BITARTRATE AND ACETAMINOPHEN 10; 325 MG/1; MG/1
1 TABLET ORAL DAILY PRN
Qty: 30 TABLET | Refills: 0 | Status: SHIPPED | OUTPATIENT
Start: 2019-05-24 | End: 2019-06-10 | Stop reason: SDUPTHER

## 2019-03-25 RX ORDER — MORPHINE SULFATE 30 MG/1
30 TABLET, FILM COATED, EXTENDED RELEASE ORAL 2 TIMES DAILY
Qty: 60 TABLET | Refills: 0 | Status: SHIPPED | OUTPATIENT
Start: 2019-05-01 | End: 2019-05-31

## 2019-03-28 NOTE — PROGRESS NOTES
This note was completed with dictation software and grammatical errors may exist.    CC:Knee pain, shoulder pain    HPI: The patient is a 68-year-old man with history of CAD and stent, hypertension, degenerative joint disease in the right shoulder and knee who presents in referral from Dr. Traore for help with pain.  He returns in follow-up today with worsening right knee pain. He continues to have low back and right shoulder pain but this is very tolerable as compared to his right knee which requires a knee replacement.  He is planning on waiting to years but reports that he will be able to do this possibly next spring due to work.    Pain intervention history:  He is status post right L3 and L4 transforaminal epidural steroid injections on 3/14/14 with 85% relief at first, now reporting what sounds like about 25% relief.  He is status post right L3 and L4 transforaminal epidural steroid injection on 6/4/14 with 0% relief.  He is status post right knee geniculate nerve block on 11/4/16 with 0% relief so radiofrequency ablation was not scheduled.    Urine drug screen on 1/22/14 was positive for buprenorphine and tramadol but negative for hydrocodone which he was apparently taking.    Repeat drug screen on 2/20/14 again positive for buprenorphine and tramadol but negative for hydrocodone.  5/20/14 and 6/25/14 urine drug screens both consistent.    ROS:He reports fatigability, chest pain at times, easy bruising, joint stiffness, joint swelling and back pain.  Balance of review of systems negative.    Medical, surgical, family and social history reviewed elsewhere in record.    Medications/Allergies: See med card    Vitals:    03/25/19 0957   BP: 136/65   Pulse: 94   Resp: 18   Temp: 98 °F (36.7 °C)   TempSrc: Oral   SpO2: 97%   Weight: 87.8 kg (193 lb 9 oz)   PainSc:   6   PainLoc: Knee         Physical exam:  Gen: A and O x3, pleasant, well-groomed  Skin: No rashes or obvious lesions  HEENT: PERRLA  CVS: Regular rate  and rhythm, normal S1 and S2, no murmurs.  Resp: Clear to auscultation bilaterally, no wheezes or rales.  Abdomen: Soft, NT/ND, normal bowel sounds present.  Musculoskeletal:  No antalgic gait.      Neuro:  Upper extremities: 5/5 strength bilaterally   Lower extremities: 5/5 strength bilaterally  Reflexes: Brachioradialis 2+, Bicep 2+, Tricep 2+. Patellar 2+, Achilles 2+ bilaterally  Sensory: Intact and symmetrical to light touch and pinprick in C2-T1 dermatomes bilaterally.  Intact and symmetrical to light touch and pinprick in L2-S1 dermatomes bilaterally.    Lumbar spine:  Range of motion is mildly limited with extension and full with flexion with increased pain during each maneuver.  Oblique extension causes mild increased pain on the corresponding side.  Jamshid's test is negative bilaterally.  Straight leg raise is negative bilaterally.  Internal/external rotation of hip is negative bilaterally.  Myofascial exam: Mild tenderness to palpation to the bilateral lumbar paraspinous muscles.    Right knee: Mild crepitus on range of motion with pain on passive flexion and extension, mild tenderness to superior portion of the joint, lateral portion, no redness or erythema or swelling.    Left knee:  Deferred due to recent surgery.    Right shoulder: Range of motion is full but painful with flexion, abduction and internal/external rotation.  Empty can test positive.  Moderate generalized tenderness to palpation to the anterior and lateral shoulder.  Mild tenderness to palpation to the right scapular region.    Imagin11 Xray right knee:   THERE IS SPURRING OF THE SUPERIOR PATELLAR FACET AND NARROWING OF THE PATELLOFEMORAL JOINT SPACE. NO JOINT EFFUSION OR ACUTE OSSEOUS ABNORMALITY IS SEEN.    14 MRI lumbar spine  At the T12-L1 level, no significant disk bulge, central canal stenosis, or neural foraminal stenosis is noted. Mild ligamentous hypertrophy is noted  At the L1-L2 level, no significant disk bulge,  central canal stenosis, or neural foraminal stenosis is noted. Mild ligamentous hypertrophy is noted   At the L2-L3 level, minimal disk bulging may be noted one to 2 mm. Facet arthropathy and ligamentous hypertrophy is noted. Minimal bilateral neural foraminal narrowing is noted. The central canal is approximately 11 mm and may be minimally narrowed.  At the L3-L4 level, broad-based disk bulging appears to be present of approximately 3 to 4 mm with an asymmetric component greater towards the right neuroforamen a 4 mm that may relate to protrusion. Facet arthropathy and ligamentous hypertrophy is noted. Mild central canal stenosis is noted to 9 mm. Mild to moderate right neuroforaminal narrowing appears to present with possible contact of the exiting nerve root on the right. Mild left neural foraminal narrowing is noted.  At the L4-L5 level the disk osteophyte protrusion appears to be present paracentric to the right of approximately 5 mm. Mild to moderate central canal narrowing is noted to 8 mm. Mild to moderate right nerve foraminal narrowing is noted with mild left neuroforaminal narrowing. Contact of the exiting right nerve root may be present. Increased T2 signal is noted suggestive of an annular tear paracentric to the right  At the L5-S1 level, ligamentous hypertrophy is noted in. No significant disk bulge, central canal stenosis, or neural foraminal stenosis is noted.      Assessment:  The patient is a 68-year-old man with history of CAD and stent, hypertension, degenerative joint disease in the right shoulder and knee who presents in referral from Dr. Traore for help with pain.   1. Chronic pain of right knee     2. Chronic right shoulder pain     3. DDD (degenerative disc disease), lumbar     4. Opioid contract exists         Plan:  1.  Dr. Giron provided prescriptions for morphine 30 mg twice a day and hydrocodone-acetaminophen 10/325 mg once daily as needed for pain.  I have reviewed the [place]  Board of [employer]acy website and there are no abberancies.    2.  Follow-up in 3 months or sooner as needed.

## 2019-04-22 ENCOUNTER — OFFICE VISIT (OUTPATIENT)
Dept: FAMILY MEDICINE | Facility: CLINIC | Age: 69
End: 2019-04-22
Payer: MEDICARE

## 2019-04-22 ENCOUNTER — LAB VISIT (OUTPATIENT)
Dept: LAB | Facility: HOSPITAL | Age: 69
End: 2019-04-22
Attending: FAMILY MEDICINE
Payer: MEDICARE

## 2019-04-22 ENCOUNTER — PATIENT MESSAGE (OUTPATIENT)
Dept: FAMILY MEDICINE | Facility: CLINIC | Age: 69
End: 2019-04-22

## 2019-04-22 ENCOUNTER — PATIENT MESSAGE (OUTPATIENT)
Dept: PAIN MEDICINE | Facility: CLINIC | Age: 69
End: 2019-04-22

## 2019-04-22 VITALS
HEIGHT: 71 IN | SYSTOLIC BLOOD PRESSURE: 146 MMHG | HEART RATE: 78 BPM | DIASTOLIC BLOOD PRESSURE: 90 MMHG | BODY MASS INDEX: 27.58 KG/M2 | TEMPERATURE: 98 F | WEIGHT: 197 LBS

## 2019-04-22 DIAGNOSIS — R51.9 RIGHT FACIAL PAIN: ICD-10-CM

## 2019-04-22 DIAGNOSIS — R51.9 NONINTRACTABLE HEADACHE, UNSPECIFIED CHRONICITY PATTERN, UNSPECIFIED HEADACHE TYPE: ICD-10-CM

## 2019-04-22 DIAGNOSIS — R51.9 RIGHT FACIAL PAIN: Primary | ICD-10-CM

## 2019-04-22 DIAGNOSIS — K08.89 TOOTH PAIN: ICD-10-CM

## 2019-04-22 LAB
BASOPHILS # BLD AUTO: 0.05 K/UL (ref 0–0.2)
BASOPHILS NFR BLD: 0.9 % (ref 0–1.9)
CRP SERPL-MCNC: 1.9 MG/L (ref 0–8.2)
DIFFERENTIAL METHOD: ABNORMAL
EOSINOPHIL # BLD AUTO: 0.2 K/UL (ref 0–0.5)
EOSINOPHIL NFR BLD: 3.9 % (ref 0–8)
ERYTHROCYTE [DISTWIDTH] IN BLOOD BY AUTOMATED COUNT: 12.4 % (ref 11.5–14.5)
ERYTHROCYTE [SEDIMENTATION RATE] IN BLOOD BY WESTERGREN METHOD: 5 MM/HR (ref 0–10)
HCT VFR BLD AUTO: 41 % (ref 40–54)
HGB BLD-MCNC: 12.7 G/DL (ref 14–18)
IMM GRANULOCYTES # BLD AUTO: 0.05 K/UL (ref 0–0.04)
IMM GRANULOCYTES NFR BLD AUTO: 0.9 % (ref 0–0.5)
LYMPHOCYTES # BLD AUTO: 1.9 K/UL (ref 1–4.8)
LYMPHOCYTES NFR BLD: 32.9 % (ref 18–48)
MCH RBC QN AUTO: 30 PG (ref 27–31)
MCHC RBC AUTO-ENTMCNC: 31 G/DL (ref 32–36)
MCV RBC AUTO: 97 FL (ref 82–98)
MONOCYTES # BLD AUTO: 0.5 K/UL (ref 0.3–1)
MONOCYTES NFR BLD: 8 % (ref 4–15)
NEUTROPHILS # BLD AUTO: 3.1 K/UL (ref 1.8–7.7)
NEUTROPHILS NFR BLD: 53.4 % (ref 38–73)
NRBC BLD-RTO: 0 /100 WBC
PLATELET # BLD AUTO: 161 K/UL (ref 150–350)
PMV BLD AUTO: 10.4 FL (ref 9.2–12.9)
RBC # BLD AUTO: 4.23 M/UL (ref 4.6–6.2)
WBC # BLD AUTO: 5.87 K/UL (ref 3.9–12.7)

## 2019-04-22 PROCEDURE — 99999 PR PBB SHADOW E&M-EST. PATIENT-LVL IV: ICD-10-PCS | Mod: PBBFAC,,, | Performed by: NURSE PRACTITIONER

## 2019-04-22 PROCEDURE — 99213 OFFICE O/P EST LOW 20 MIN: CPT | Mod: S$PBB,,, | Performed by: NURSE PRACTITIONER

## 2019-04-22 PROCEDURE — 85025 COMPLETE CBC W/AUTO DIFF WBC: CPT

## 2019-04-22 PROCEDURE — 99213 PR OFFICE/OUTPT VISIT, EST, LEVL III, 20-29 MIN: ICD-10-PCS | Mod: S$PBB,,, | Performed by: NURSE PRACTITIONER

## 2019-04-22 PROCEDURE — 86140 C-REACTIVE PROTEIN: CPT

## 2019-04-22 PROCEDURE — 85651 RBC SED RATE NONAUTOMATED: CPT | Mod: PO

## 2019-04-22 PROCEDURE — 36415 COLL VENOUS BLD VENIPUNCTURE: CPT | Mod: PO

## 2019-04-22 PROCEDURE — 99999 PR PBB SHADOW E&M-EST. PATIENT-LVL IV: CPT | Mod: PBBFAC,,, | Performed by: NURSE PRACTITIONER

## 2019-04-22 PROCEDURE — 99214 OFFICE O/P EST MOD 30 MIN: CPT | Mod: PBBFAC,PO | Performed by: NURSE PRACTITIONER

## 2019-04-22 RX ORDER — AMOXICILLIN AND CLAVULANATE POTASSIUM 875; 125 MG/1; MG/1
1 TABLET, FILM COATED ORAL EVERY 12 HOURS
Qty: 20 TABLET | Refills: 0 | Status: SHIPPED | OUTPATIENT
Start: 2019-04-22 | End: 2019-05-02

## 2019-04-22 RX ORDER — METHYLPREDNISOLONE 4 MG/1
TABLET ORAL
Qty: 1 PACKAGE | Refills: 0 | Status: SHIPPED | OUTPATIENT
Start: 2019-04-22 | End: 2019-05-13

## 2019-04-22 NOTE — PROGRESS NOTES
Subjective:       Patient ID: Luigi Murillo is a 68 y.o. male.    Chief Complaint: Jaw Pain    Facial Pain   This is a new (right side) problem. The current episode started 1 to 4 weeks ago. The problem occurs daily. The problem has been unchanged. Associated symptoms include headaches (x 1; described as shooting pain from temple to lower jaw; resolved) and a sore throat. Pertinent negatives include no abdominal pain, anorexia, arthralgias, change in bowel habit, chest pain, chills, congestion, coughing, diaphoresis, fatigue, fever, joint swelling, myalgias, nausea, neck pain, numbness, rash, swollen glands, urinary symptoms, vertigo, visual change, vomiting or weakness. Associated symptoms comments: Right lower jaw swelling, tooth pain. Nothing aggravates the symptoms. He has tried nothing for the symptoms. The treatment provided no relief.     Past Medical History:   Diagnosis Date    Anticoagulant long-term use     ASA 81mg, effient    BPH (benign prostatic hypertrophy) with urinary obstruction     CAD (coronary artery disease)     2 stents    Chronic pain     DDD (degenerative disc disease), lumbar     Depression     Depression, major, in remission     DJD (degenerative joint disease) of knee     Hyperlipidemia LDL goal < 100     Hypertension     MRSA cellulitis     S/P coronary artery stent placement May and Yarely 2011    x2     Social History     Socioeconomic History    Marital status:      Spouse name: Not on file    Number of children: Not on file    Years of education: Not on file    Highest education level: Not on file   Occupational History    Not on file   Social Needs    Financial resource strain: Not on file    Food insecurity:     Worry: Not on file     Inability: Not on file    Transportation needs:     Medical: Not on file     Non-medical: Not on file   Tobacco Use    Smoking status: Former Smoker     Packs/day: 1.00     Years: 25.00     Pack years: 25.00     Last  attempt to quit: 2007     Years since quittin.9    Smokeless tobacco: Never Used   Substance and Sexual Activity    Alcohol use: No    Drug use: Yes     Types: Hydrocodone    Sexual activity: Not on file   Lifestyle    Physical activity:     Days per week: Not on file     Minutes per session: Not on file    Stress: Not on file   Relationships    Social connections:     Talks on phone: Not on file     Gets together: Not on file     Attends Zoroastrian service: Not on file     Active member of club or organization: Not on file     Attends meetings of clubs or organizations: Not on file     Relationship status: Not on file   Other Topics Concern    Not on file   Social History Narrative    Not on file     Past Surgical History:   Procedure Laterality Date    BLOCK-NERVE Knee Geniculate Right 2016    [facility]    CORONARY ANGIOPLASTY      2 Stents placed a few year ago    CORONARY STENT PLACEMENT      x 2    Epidural steroid injection      Pain management    GRISELDA-TRANSFORAMINAL Right 2014    [facility]    GRISELDA-TRANSFORAMINAL Right 3/14/2014    [facility]    INJURY      KNEE SURGERY      Right, x2    MANDIBLE FRACTURE SURGERY      accident during  service    RELEASE-FINGER-TRIGGER Right 2017    Per[facility]    ROTATOR CUFF REPAIR      right times 2    VASECTOMY         Review of Systems   Constitutional: Negative.  Negative for chills, diaphoresis, fatigue and fever.   HENT: Positive for sore throat. Negative for congestion.         Right facial pain, right lower jaw swelling   Eyes: Negative.    Respiratory: Negative.  Negative for cough.    Cardiovascular: Negative.  Negative for chest pain.   Gastrointestinal: Negative.  Negative for abdominal pain, anorexia, change in bowel habit, nausea and vomiting.   Endocrine: Negative.     Genitourinary: Negative.    Musculoskeletal: Negative.  Negative for arthralgias, joint swelling, myalgias and neck pain.   Skin: Negative.  Negative for rash.   Allergic/Immunologic: Negative.    Neurological: Positive for headaches (x 1; described as shooting pain from temple to lower jaw; resolved). Negative for vertigo, weakness and numbness.   Psychiatric/Behavioral: Negative.        Objective:      Physical Exam   Constitutional: He is oriented to person, place, and time. He appears well-developed and well-nourished.   HENT:   Head: Normocephalic.       Right Ear: Hearing, tympanic membrane, external ear and ear canal normal.   Left Ear: Hearing, tympanic membrane, external ear and ear canal normal.   Nose: Nose normal.   Mouth/Throat: Uvula is midline and mucous membranes are normal. Posterior oropharyngeal erythema present.   Eyes: Pupils are equal, round, and reactive to light. Conjunctivae are normal.   Neck: Normal range of motion. Neck supple.   Cardiovascular: Normal rate, regular rhythm and normal heart sounds.   Pulmonary/Chest: Effort normal and breath sounds normal.   Abdominal: Soft. Bowel sounds are normal.   Musculoskeletal: Normal range of motion.   Neurological: He is alert and oriented to person, place, and time.   Skin: Skin is warm and dry. Capillary refill takes 2 to 3 seconds.   Psychiatric: He has a normal mood and affect. His behavior is normal. Judgment and thought content normal.   Nursing note and vitals reviewed.      Assessment:       1. Right facial pain    2. Tooth pain    3. Nonintractable headache, unspecified chronicity pattern, unspecified headache type        Plan:           Luigi was seen today for jaw pain.    Diagnoses and all orders for this visit:    Right facial pain  Tooth pain  Nonintractable headache, unspecified chronicity pattern, unspecified headache type  -     Sedimentation rate; Future  -     C-reactive protein; Future  -     CBC auto differential; Future  -      methylPREDNISolone (MEDROL DOSEPACK) 4 mg tablet; use as directed  -     amoxicillin-clavulanate 875-125mg (AUGMENTIN) 875-125 mg per tablet; Take 1 tablet by mouth every 12 (twelve) hours. for 10 days        Cool compresses to affected area 2-3 x/d PRN        Report to ER immediately if symptoms worsen

## 2019-05-06 RX ORDER — ATORVASTATIN CALCIUM 40 MG/1
TABLET, FILM COATED ORAL
Qty: 90 TABLET | Refills: 0 | Status: SHIPPED | OUTPATIENT
Start: 2019-05-06 | End: 2019-08-04 | Stop reason: SDUPTHER

## 2019-05-21 ENCOUNTER — OFFICE VISIT (OUTPATIENT)
Dept: FAMILY MEDICINE | Facility: CLINIC | Age: 69
End: 2019-05-21
Payer: MEDICARE

## 2019-05-21 ENCOUNTER — TELEPHONE (OUTPATIENT)
Dept: FAMILY MEDICINE | Facility: CLINIC | Age: 69
End: 2019-05-21

## 2019-05-21 VITALS
TEMPERATURE: 98 F | HEART RATE: 85 BPM | SYSTOLIC BLOOD PRESSURE: 127 MMHG | HEIGHT: 71 IN | DIASTOLIC BLOOD PRESSURE: 79 MMHG | BODY MASS INDEX: 26.77 KG/M2 | WEIGHT: 191.19 LBS

## 2019-05-21 DIAGNOSIS — R51.9 RIGHT FACIAL PAIN: Primary | ICD-10-CM

## 2019-05-21 DIAGNOSIS — H65.91 FLUID LEVEL BEHIND TYMPANIC MEMBRANE OF RIGHT EAR: ICD-10-CM

## 2019-05-21 DIAGNOSIS — R51.9 FACE PAIN: ICD-10-CM

## 2019-05-21 DIAGNOSIS — R51.9 NONINTRACTABLE HEADACHE, UNSPECIFIED CHRONICITY PATTERN, UNSPECIFIED HEADACHE TYPE: ICD-10-CM

## 2019-05-21 PROCEDURE — 99999 PR PBB SHADOW E&M-EST. PATIENT-LVL V: CPT | Mod: PBBFAC,,, | Performed by: NURSE PRACTITIONER

## 2019-05-21 PROCEDURE — 99215 OFFICE O/P EST HI 40 MIN: CPT | Mod: PBBFAC,PO | Performed by: NURSE PRACTITIONER

## 2019-05-21 PROCEDURE — 99999 PR PBB SHADOW E&M-EST. PATIENT-LVL V: ICD-10-PCS | Mod: PBBFAC,,, | Performed by: NURSE PRACTITIONER

## 2019-05-21 PROCEDURE — 99213 PR OFFICE/OUTPT VISIT, EST, LEVL III, 20-29 MIN: ICD-10-PCS | Mod: S$PBB,,, | Performed by: NURSE PRACTITIONER

## 2019-05-21 PROCEDURE — 99213 OFFICE O/P EST LOW 20 MIN: CPT | Mod: S$PBB,,, | Performed by: NURSE PRACTITIONER

## 2019-05-21 RX ORDER — CETIRIZINE HYDROCHLORIDE 10 MG/1
10 TABLET ORAL DAILY
Qty: 5 TABLET | Refills: 0 | Status: SHIPPED | OUTPATIENT
Start: 2019-05-21 | End: 2019-11-15

## 2019-05-21 RX ORDER — CETIRIZINE HYDROCHLORIDE 10 MG/1
10 TABLET ORAL DAILY
Qty: 10 TABLET | Refills: 0 | Status: SHIPPED | OUTPATIENT
Start: 2019-05-21 | End: 2019-05-21

## 2019-05-21 NOTE — TELEPHONE ENCOUNTER
Called patient to let them know to go to ER if symptoms get worse before Mri is done. Pt did not answer. Please try to call again later

## 2019-05-21 NOTE — TELEPHONE ENCOUNTER
----- Message from Carlos Soares sent at 5/21/2019 10:24 AM CDT -----  Contact: [phone]  Would like a nurse to contact him regarding instructions he did not receive, please contact him @ [phone]. Thanks

## 2019-05-21 NOTE — PROGRESS NOTES
Subjective:       Patient ID: Luigi Murillo is a 68 y.o. male.    Chief Complaint: Otalgia (right ear along jawline and temple)    Headache    This is a recurrent problem. The current episode started more than 1 month ago. The problem occurs daily. The problem has been unchanged. The pain is located in the right unilateral region. The pain radiates to the face. The pain quality is not similar to prior headaches. The quality of the pain is described as aching, sharp and shooting (Temple to lower jaw). Pain severity now: Alternates from mild to moderate. Pertinent negatives include no abdominal pain, abnormal behavior, anorexia, back pain, blurred vision, coughing, dizziness, drainage, ear pain, eye pain, eye redness, eye watering, facial sweating, fever, hearing loss, insomnia, loss of balance, muscle aches, nausea, neck pain, numbness, phonophobia, photophobia, rhinorrhea, scalp tenderness, seizures, sinus pressure, sore throat, swollen glands, tingling, tinnitus, visual change, vomiting, weakness or weight loss. Nothing aggravates the symptoms. He has tried oral narcotics (abx, steroids) for the symptoms. The treatment provided no relief. His past medical history is significant for hypertension. There is no history of cancer, cluster headaches, immunosuppression, migraine headaches, migraines in the family, obesity, pseudotumor cerebri, recent head traumas, sinus disease or TMJ.     Past Medical History:   Diagnosis Date    Anticoagulant long-term use     ASA 81mg, effient    BPH (benign prostatic hypertrophy) with urinary obstruction     CAD (coronary artery disease)     2 stents    Chronic pain     DDD (degenerative disc disease), lumbar     Depression     Depression, major, in remission     DJD (degenerative joint disease) of knee     Hyperlipidemia LDL goal < 100     Hypertension     MRSA cellulitis     S/P coronary artery stent placement May and Yarely 2011    x2     Social History      Socioeconomic History    Marital status:      Spouse name: Not on file    Number of children: Not on file    Years of education: Not on file    Highest education level: Not on file   Occupational History    Not on file   Social Needs    Financial resource strain: Not on file    Food insecurity:     Worry: Not on file     Inability: Not on file    Transportation needs:     Medical: Not on file     Non-medical: Not on file   Tobacco Use    Smoking status: Former Smoker     Packs/day: 1.00     Years: 25.00     Pack years: 25.00     Last attempt to quit: 2007     Years since quittin.0    Smokeless tobacco: Never Used   Substance and Sexual Activity    Alcohol use: No    Drug use: Yes     Types: Hydrocodone    Sexual activity: Not on file   Lifestyle    Physical activity:     Days per week: Not on file     Minutes per session: Not on file    Stress: Not on file   Relationships    Social connections:     Talks on phone: Not on file     Gets together: Not on file     Attends Buddhist service: Not on file     Active member of club or organization: Not on file     Attends meetings of clubs or organizations: Not on file     Relationship status: Not on file   Other Topics Concern    Not on file   Social History Narrative    Not on file     Past Surgical History:   Procedure Laterality Date    BLOCK-NERVE Knee Geniculate Right 2016    [facility]    CORONARY ANGIOPLASTY      2 Stents placed a few year ago    CORONARY STENT PLACEMENT      x 2    Epidural steroid injection      Pain management    GRISELDA-TRANSFORAMINAL Right 2014    [facility]    GRISELDA-TRANSFORAMINAL Right 3/14/2014    [facility]    INJURY      KNEE SURGERY      Right, x2    MANDIBLE FRACTURE SURGERY      accident during  service    RELEASE-FINGER-TRIGGER Right 2017    [facility]    ROTATOR CUFF REPAIR      right times 2    VASECTOMY         Review of Systems   Constitutional: Negative.  Negative for fever and weight loss.   HENT: Negative.  Negative for ear pain, hearing loss, rhinorrhea, sinus pressure, sore throat and tinnitus.    Eyes: Negative.  Negative for blurred vision, photophobia, pain and redness.   Respiratory: Negative.  Negative for cough.    Cardiovascular: Negative.    Gastrointestinal: Negative.  Negative for abdominal pain, anorexia, nausea and vomiting.   Endocrine: Negative.    Genitourinary: Negative.    Musculoskeletal: Negative.  Negative for back pain and neck pain.   Skin: Negative.    Neurological: Positive for headaches. Negative for dizziness, tingling, seizures, weakness, numbness and loss of balance.   Psychiatric/Behavioral: Negative.  The patient does not have insomnia.        Objective:      Physical Exam   Constitutional: He is oriented to person, place, and time. He appears well-developed and well-nourished.   HENT:   Head: Normocephalic.   Right Ear: Hearing, external ear and ear canal normal. A middle ear effusion is present.   Left Ear: Hearing, tympanic membrane, external ear and ear canal normal.   Nose: Nose normal.   Mouth/Throat: Uvula is midline, oropharynx is clear and moist and mucous membranes are normal.   Eyes: Pupils are equal, round, and reactive to light. Conjunctivae are normal.   Neck: Normal range of motion. Neck supple.   Cardiovascular: Normal rate, regular rhythm and normal heart sounds.   Pulmonary/Chest: Effort normal and breath sounds normal.   Abdominal: Soft. Bowel sounds are normal.   Musculoskeletal: Normal range of motion.   Neurological: He is alert and oriented to person, place, and time.   Skin: Skin is warm and dry. Capillary refill takes 2 to 3 seconds.   Psychiatric: He has a normal mood and affect. His behavior is normal. Judgment and thought content normal.   Nursing note and vitals reviewed.       Assessment:       1. Right facial pain    2. Nonintractable headache, unspecified chronicity pattern, unspecified headache type    3. Fluid level behind tympanic membrane of right ear    4. Face pain        Plan:       Luigi was seen today for otalgia.    Diagnoses and all orders for this visit:    Right facial pain  Nonintractable headache, unspecified chronicity pattern, unspecified headache type  Fluid level behind tympanic membrane of right ear  Face pain  -     MRI Brain Without Contrast; Future  -     Ambulatory referral to Neurology  -     cetirizine (ZYRTEC) 10 MG tablet; Take 1 tablet (10 mg total) by mouth once daily. for 5 days  Flonase OTC as directed        Pain medication as prescribed        Report to immediately if symptoms worsen

## 2019-05-21 NOTE — PATIENT INSTRUCTIONS
Report to ER immediately if symptoms worsen  Zyrtec sent to pharmacy for middle ear effusion; do not take for more than 5 d  Flonase OTC also recommended

## 2019-05-21 NOTE — TELEPHONE ENCOUNTER
From previous message: Called patient to let them know to go to ER if symptoms get worse before Mri is done. Pt did not answer. Please try to call again later    Patient informed and verbalized understanding

## 2019-05-28 ENCOUNTER — PATIENT MESSAGE (OUTPATIENT)
Dept: PAIN MEDICINE | Facility: CLINIC | Age: 69
End: 2019-05-28

## 2019-05-28 ENCOUNTER — TELEPHONE (OUTPATIENT)
Dept: PAIN MEDICINE | Facility: CLINIC | Age: 69
End: 2019-05-28

## 2019-05-28 NOTE — TELEPHONE ENCOUNTER
----- Message from Arin Ayala sent at 5/28/2019  9:39 AM CDT -----  Type:  Pharmacy Calling to Clarify an RX    Name of Caller:   Emily   Pharmacy Name:    [facility] [address] [place] - [id number] [address]  [address]  [place] [address]  Phone: [phone] Fax: [phone]           Prescription Name:  morphine (MS CONTIN) 30 MG 12 hr tablet   What do they need to clarify?:  Asking for a refill today / pt going out of town / tomorrow am   Best Call Back Number:  [phone]   Additional Information:  Pt at pharmacy

## 2019-05-28 NOTE — TELEPHONE ENCOUNTER
Spoke with the pharmacist and she will have the medication ready for fill today. The patient is leaving for work this afternoon.

## 2019-06-01 ENCOUNTER — PATIENT MESSAGE (OUTPATIENT)
Dept: PAIN MEDICINE | Facility: CLINIC | Age: 69
End: 2019-06-01

## 2019-06-06 DIAGNOSIS — I25.10 CAD IN NATIVE ARTERY: ICD-10-CM

## 2019-06-06 RX ORDER — METOPROLOL SUCCINATE 25 MG/1
TABLET, EXTENDED RELEASE ORAL
Qty: 45 TABLET | Refills: 2 | Status: SHIPPED | OUTPATIENT
Start: 2019-06-06 | End: 2019-11-15 | Stop reason: SDUPTHER

## 2019-06-09 ENCOUNTER — PATIENT MESSAGE (OUTPATIENT)
Dept: PAIN MEDICINE | Facility: CLINIC | Age: 69
End: 2019-06-09

## 2019-06-10 ENCOUNTER — OFFICE VISIT (OUTPATIENT)
Dept: PAIN MEDICINE | Facility: CLINIC | Age: 69
End: 2019-06-10
Payer: MEDICARE

## 2019-06-10 VITALS
BODY MASS INDEX: 26.76 KG/M2 | HEART RATE: 73 BPM | HEIGHT: 71 IN | WEIGHT: 191.13 LBS | SYSTOLIC BLOOD PRESSURE: 125 MMHG | DIASTOLIC BLOOD PRESSURE: 83 MMHG

## 2019-06-10 DIAGNOSIS — M25.561 CHRONIC PAIN OF RIGHT KNEE: Primary | ICD-10-CM

## 2019-06-10 DIAGNOSIS — M25.511 CHRONIC RIGHT SHOULDER PAIN: ICD-10-CM

## 2019-06-10 DIAGNOSIS — G89.29 CHRONIC RIGHT SHOULDER PAIN: ICD-10-CM

## 2019-06-10 DIAGNOSIS — Z79.891 OPIOID CONTRACT EXISTS: ICD-10-CM

## 2019-06-10 DIAGNOSIS — G89.29 CHRONIC PAIN OF RIGHT KNEE: Primary | ICD-10-CM

## 2019-06-10 PROCEDURE — 99999 PR PBB SHADOW E&M-EST. PATIENT-LVL IV: CPT | Mod: PBBFAC,,, | Performed by: PHYSICIAN ASSISTANT

## 2019-06-10 PROCEDURE — 99999 PR PBB SHADOW E&M-EST. PATIENT-LVL IV: ICD-10-PCS | Mod: PBBFAC,,, | Performed by: PHYSICIAN ASSISTANT

## 2019-06-10 PROCEDURE — 99213 PR OFFICE/OUTPT VISIT, EST, LEVL III, 20-29 MIN: ICD-10-PCS | Mod: S$PBB,,, | Performed by: PHYSICIAN ASSISTANT

## 2019-06-10 PROCEDURE — 99214 OFFICE O/P EST MOD 30 MIN: CPT | Mod: PBBFAC,PN | Performed by: PHYSICIAN ASSISTANT

## 2019-06-10 PROCEDURE — 99213 OFFICE O/P EST LOW 20 MIN: CPT | Mod: S$PBB,,, | Performed by: PHYSICIAN ASSISTANT

## 2019-06-10 RX ORDER — MORPHINE SULFATE 30 MG/1
30 TABLET, FILM COATED, EXTENDED RELEASE ORAL 2 TIMES DAILY
Qty: 60 TABLET | Refills: 0 | Status: SHIPPED | OUTPATIENT
Start: 2019-06-27 | End: 2019-07-27

## 2019-06-10 RX ORDER — HYDROCODONE BITARTRATE AND ACETAMINOPHEN 10; 325 MG/1; MG/1
1 TABLET ORAL DAILY PRN
Qty: 30 TABLET | Refills: 0 | Status: SHIPPED | OUTPATIENT
Start: 2019-06-23 | End: 2019-07-23

## 2019-06-10 RX ORDER — MORPHINE SULFATE 30 MG/1
30 TABLET, FILM COATED, EXTENDED RELEASE ORAL 2 TIMES DAILY
Qty: 60 TABLET | Refills: 0 | Status: SHIPPED | OUTPATIENT
Start: 2019-07-27 | End: 2019-08-26

## 2019-06-10 RX ORDER — HYDROCODONE BITARTRATE AND ACETAMINOPHEN 10; 325 MG/1; MG/1
1 TABLET ORAL DAILY PRN
Qty: 30 TABLET | Refills: 0 | Status: SHIPPED | OUTPATIENT
Start: 2019-07-23 | End: 2019-08-22

## 2019-06-10 RX ORDER — HYDROCODONE BITARTRATE AND ACETAMINOPHEN 10; 325 MG/1; MG/1
1 TABLET ORAL DAILY PRN
Qty: 30 TABLET | Refills: 0 | Status: SHIPPED | OUTPATIENT
Start: 2019-08-22 | End: 2019-09-10 | Stop reason: SDUPTHER

## 2019-06-10 RX ORDER — MORPHINE SULFATE 30 MG/1
30 TABLET, FILM COATED, EXTENDED RELEASE ORAL 2 TIMES DAILY
Qty: 60 TABLET | Refills: 0 | Status: SHIPPED | OUTPATIENT
Start: 2019-08-26 | End: 2019-09-10 | Stop reason: SDUPTHER

## 2019-06-10 NOTE — PROGRESS NOTES
"This note was completed with dictation software and grammatical errors may exist.    CC:Knee pain, shoulder pain    HPI: The patient is a 68-year-old man with history of CAD and stent, hypertension, degenerative joint disease in the right shoulder and knee who presents in referral from Dr. Traore for help with pain.  He returns in follow-up today with right knee and right shoulder pain.  His back has been doing well lately.  He has decided that he is going to retire at the end of this month and hopes to be able to have a right knee replacement this year sometime.  He continues to take pain medication with relief and has been traveling back and forth to [place] for work.    Pain intervention history:  He is status post right L3 and L4 transforaminal epidural steroid injections on 3/14/14 with 85% relief at first, now reporting what sounds like about 25% relief.  He is status post right L3 and L4 transforaminal epidural steroid injection on 6/4/14 with 0% relief.  He is status post right knee geniculate nerve block on 11/4/16 with 0% relief so radiofrequency ablation was not scheduled.    Urine drug screen on 1/22/14 was positive for buprenorphine and tramadol but negative for hydrocodone which he was apparently taking.    Repeat drug screen on 2/20/14 again positive for buprenorphine and tramadol but negative for hydrocodone.  5/20/14 and 6/25/14 urine drug screens both consistent.    ROS:He reports fatigability, chest pain at times, easy bruising, joint stiffness, joint swelling and back pain.  Balance of review of systems negative.    Medical, surgical, family and social history reviewed elsewhere in record.    Medications/Allergies: See med card    Vitals:    06/10/19 0727   BP: 125/83   Pulse: 73   Weight: 86.7 kg (191 lb 2.2 oz)   Height: 5' 11" (1.803 m)   PainSc:   8   PainLoc: Knee         Physical exam:  Gen: A and O x3, pleasant, well-groomed  Skin: No rashes or obvious lesions  HEENT: PERRLA  CVS: Regular " rate and rhythm, normal S1 and S2, no murmurs.  Resp: Clear to auscultation bilaterally, no wheezes or rales.  Abdomen: Soft, NT/ND, normal bowel sounds present.  Musculoskeletal:  No antalgic gait.      Neuro:  Upper extremities: 5/5 strength bilaterally   Lower extremities: 5/5 strength bilaterally  Reflexes: Brachioradialis 2+, Bicep 2+, Tricep 2+. Patellar 2+, Achilles 2+ bilaterally  Sensory: Intact and symmetrical to light touch and pinprick in C2-T1 dermatomes bilaterally.  Intact and symmetrical to light touch and pinprick in L2-S1 dermatomes bilaterally.    Lumbar spine:  Range of motion is mildly limited with extension and full with flexion with increased pain during each maneuver.  Oblique extension causes mild increased pain on the corresponding side.  Jamshid's test is negative bilaterally.  Straight leg raise is negative bilaterally.  Internal/external rotation of hip is negative bilaterally.  Myofascial exam: Mild tenderness to palpation to the bilateral lumbar paraspinous muscles.    Right knee: Mild crepitus on range of motion with pain on passive flexion and extension, mild tenderness to superior portion of the joint, lateral portion, no redness or erythema or swelling.    Right shoulder: Range of motion is full but painful with flexion, abduction and internal/external rotation.  Empty can test positive.  Moderate generalized tenderness to palpation to the anterior and lateral shoulder.  Mild tenderness to palpation to the right scapular region.    Imagin11 Xray right knee:   THERE IS SPURRING OF THE SUPERIOR PATELLAR FACET AND NARROWING OF THE PATELLOFEMORAL JOINT SPACE. NO JOINT EFFUSION OR ACUTE OSSEOUS ABNORMALITY IS SEEN.    14 MRI lumbar spine  At the T12-L1 level, no significant disk bulge, central canal stenosis, or neural foraminal stenosis is noted. Mild ligamentous hypertrophy is noted  At the L1-L2 level, no significant disk bulge, central canal stenosis, or neural  foraminal stenosis is noted. Mild ligamentous hypertrophy is noted   At the L2-L3 level, minimal disk bulging may be noted one to 2 mm. Facet arthropathy and ligamentous hypertrophy is noted. Minimal bilateral neural foraminal narrowing is noted. The central canal is approximately 11 mm and may be minimally narrowed.  At the L3-L4 level, broad-based disk bulging appears to be present of approximately 3 to 4 mm with an asymmetric component greater towards the right neuroforamen a 4 mm that may relate to protrusion. Facet arthropathy and ligamentous hypertrophy is noted. Mild central canal stenosis is noted to 9 mm. Mild to moderate right neuroforaminal narrowing appears to present with possible contact of the exiting nerve root on the right. Mild left neural foraminal narrowing is noted.  At the L4-L5 level the disk osteophyte protrusion appears to be present paracentric to the right of approximately 5 mm. Mild to moderate central canal narrowing is noted to 8 mm. Mild to moderate right nerve foraminal narrowing is noted with mild left neuroforaminal narrowing. Contact of the exiting right nerve root may be present. Increased T2 signal is noted suggestive of an annular tear paracentric to the right  At the L5-S1 level, ligamentous hypertrophy is noted in. No significant disk bulge, central canal stenosis, or neural foraminal stenosis is noted.      Assessment:  The patient is a 68-year-old man with history of CAD and stent, hypertension, degenerative joint disease in the right shoulder and knee who presents in referral from Dr. Traore for help with pain.   1. Chronic pain of right knee     2. Chronic right shoulder pain     3. Opioid contract exists         Plan:  1.  Dr. Giron provided prescriptions for morphine 30 mg twice a day and hydrocodone-acetaminophen 10/325 mg once daily as needed for pain.  I have reviewed the [place] Board of Pharmacy website and there are no abberancies.  He is going to retire at  the end of this month and is no longer going to to travel back and forth to [place] for work.  He plans on having a right knee replacement sometime this year.  2.  Follow-up in 3 months or sooner as needed.

## 2019-06-24 ENCOUNTER — TELEPHONE (OUTPATIENT)
Dept: PAIN MEDICINE | Facility: CLINIC | Age: 69
End: 2019-06-24

## 2019-06-24 NOTE — TELEPHONE ENCOUNTER
----- Message from Raquel Watson sent at 6/24/2019  8:45 AM CDT -----  Contact: Emily  Type:  Pharmacy Calling to Clarify an RX    Name of Caller:  Emily  Pharmacy Name:    [facility] [place] - [id number] [facility]  [address] [address] [place] [address]  Phone: [phone] Fax: [phone]  Prescription Name:  HYDROcodone-acetaminophen (NORCO)  mg per tablet  What do they need to clarify?:  nico  Best Call Back Number:  [phone]  Additional Information:  Asking for general purposes if able to fill a Rx one day early as they are closed on Sunday and Rx was due on that day for this patient.

## 2019-06-27 ENCOUNTER — PATIENT MESSAGE (OUTPATIENT)
Dept: FAMILY MEDICINE | Facility: CLINIC | Age: 69
End: 2019-06-27

## 2019-07-16 ENCOUNTER — PATIENT MESSAGE (OUTPATIENT)
Dept: PAIN MEDICINE | Facility: CLINIC | Age: 69
End: 2019-07-16

## 2019-07-17 RX ORDER — CYCLOBENZAPRINE HCL 10 MG
TABLET ORAL
Qty: 90 TABLET | Refills: 3 | Status: SHIPPED | OUTPATIENT
Start: 2019-07-17 | End: 2022-08-24

## 2019-07-17 RX ORDER — BUPROPION HYDROCHLORIDE 300 MG/1
TABLET ORAL
Qty: 90 TABLET | Refills: 0 | Status: SHIPPED | OUTPATIENT
Start: 2019-07-17 | End: 2019-11-15 | Stop reason: SDUPTHER

## 2019-07-17 NOTE — TELEPHONE ENCOUNTER
Please let the patient know that it should not be canceled and there should be refills on there.  I have sent more refills for him.  Let us know if there are any issues.

## 2019-07-18 ENCOUNTER — PATIENT MESSAGE (OUTPATIENT)
Dept: FAMILY MEDICINE | Facility: CLINIC | Age: 69
End: 2019-07-18

## 2019-08-05 RX ORDER — ATORVASTATIN CALCIUM 40 MG/1
TABLET, FILM COATED ORAL
Qty: 90 TABLET | Refills: 0 | Status: SHIPPED | OUTPATIENT
Start: 2019-08-05 | End: 2019-11-15 | Stop reason: SDUPTHER

## 2019-09-10 ENCOUNTER — OFFICE VISIT (OUTPATIENT)
Dept: PAIN MEDICINE | Facility: CLINIC | Age: 69
End: 2019-09-10
Payer: MEDICARE

## 2019-09-10 VITALS
BODY MASS INDEX: 25.84 KG/M2 | OXYGEN SATURATION: 98 % | RESPIRATION RATE: 18 BRPM | DIASTOLIC BLOOD PRESSURE: 58 MMHG | SYSTOLIC BLOOD PRESSURE: 139 MMHG | WEIGHT: 185.31 LBS | TEMPERATURE: 97 F | HEART RATE: 78 BPM

## 2019-09-10 DIAGNOSIS — G89.29 CHRONIC PAIN OF RIGHT KNEE: Primary | ICD-10-CM

## 2019-09-10 DIAGNOSIS — M25.511 CHRONIC RIGHT SHOULDER PAIN: ICD-10-CM

## 2019-09-10 DIAGNOSIS — Z79.891 OPIOID CONTRACT EXISTS: ICD-10-CM

## 2019-09-10 DIAGNOSIS — M25.561 CHRONIC PAIN OF RIGHT KNEE: Primary | ICD-10-CM

## 2019-09-10 DIAGNOSIS — G89.29 CHRONIC RIGHT SHOULDER PAIN: ICD-10-CM

## 2019-09-10 PROCEDURE — 99999 PR PBB SHADOW E&M-EST. PATIENT-LVL V: ICD-10-PCS | Mod: PBBFAC,,, | Performed by: PHYSICIAN ASSISTANT

## 2019-09-10 PROCEDURE — 99215 OFFICE O/P EST HI 40 MIN: CPT | Mod: PBBFAC,PN | Performed by: PHYSICIAN ASSISTANT

## 2019-09-10 PROCEDURE — 99214 OFFICE O/P EST MOD 30 MIN: CPT | Mod: S$PBB,,, | Performed by: PHYSICIAN ASSISTANT

## 2019-09-10 PROCEDURE — 99214 PR OFFICE/OUTPT VISIT, EST, LEVL IV, 30-39 MIN: ICD-10-PCS | Mod: S$PBB,,, | Performed by: PHYSICIAN ASSISTANT

## 2019-09-10 PROCEDURE — 99999 PR PBB SHADOW E&M-EST. PATIENT-LVL V: CPT | Mod: PBBFAC,,, | Performed by: PHYSICIAN ASSISTANT

## 2019-09-10 RX ORDER — MORPHINE SULFATE 30 MG/1
30 TABLET, FILM COATED, EXTENDED RELEASE ORAL 2 TIMES DAILY
Qty: 60 TABLET | Refills: 0 | Status: SHIPPED | OUTPATIENT
Start: 2019-09-25 | End: 2019-10-25

## 2019-09-10 RX ORDER — HYDROCODONE BITARTRATE AND ACETAMINOPHEN 10; 325 MG/1; MG/1
1 TABLET ORAL DAILY PRN
Qty: 30 TABLET | Refills: 0 | Status: SHIPPED | OUTPATIENT
Start: 2019-10-22 | End: 2019-11-21

## 2019-09-10 RX ORDER — HYDROCODONE BITARTRATE AND ACETAMINOPHEN 10; 325 MG/1; MG/1
1 TABLET ORAL DAILY PRN
Qty: 30 TABLET | Refills: 0 | Status: SHIPPED | OUTPATIENT
Start: 2019-11-21 | End: 2019-12-11 | Stop reason: SDUPTHER

## 2019-09-10 RX ORDER — MORPHINE SULFATE 30 MG/1
30 TABLET, FILM COATED, EXTENDED RELEASE ORAL 2 TIMES DAILY
Qty: 60 TABLET | Refills: 0 | Status: SHIPPED | OUTPATIENT
Start: 2019-11-24 | End: 2019-12-11 | Stop reason: SDUPTHER

## 2019-09-10 RX ORDER — MORPHINE SULFATE 30 MG/1
30 TABLET, FILM COATED, EXTENDED RELEASE ORAL 2 TIMES DAILY
Qty: 60 TABLET | Refills: 0 | Status: SHIPPED | OUTPATIENT
Start: 2019-10-25 | End: 2019-11-24

## 2019-09-10 RX ORDER — HYDROCODONE BITARTRATE AND ACETAMINOPHEN 10; 325 MG/1; MG/1
1 TABLET ORAL DAILY PRN
Qty: 30 TABLET | Refills: 0 | Status: SHIPPED | OUTPATIENT
Start: 2019-09-22 | End: 2019-10-22

## 2019-09-10 NOTE — PROGRESS NOTES
This note was completed with dictation software and grammatical errors may exist.    CC:Knee pain, shoulder pain    HPI: The patient is a 68-year-old man with history of CAD and stent, hypertension, degenerative joint disease in the right shoulder and knee who presents in referral from Dr. Traore for help with pain.  He returns follow-up today with continued right knee and right shoulder pain. He feels that his right knee pain is getting worse and he plans on having a right knee replacement this year.  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  He continues to take pain medication with relief but hopes to be able to take less medication after his surgery.    Pain intervention history:  He is status post right L3 and L4 transforaminal epidural steroid injections on 3/14/14 with 85% relief at first, now reporting what sounds like about 25% relief.  He is status post right L3 and L4 transforaminal epidural steroid injection on 6/4/14 with 0% relief.  He is status post right knee geniculate nerve block on 11/4/16 with 0% relief so radiofrequency ablation was not scheduled.    Urine drug screen on 1/22/14 was positive for buprenorphine and tramadol but negative for hydrocodone which he was apparently taking.    Repeat drug screen on 2/20/14 again positive for buprenorphine and tramadol but negative for hydrocodone.  5/20/14 and 6/25/14 urine drug screens both consistent.    ROS:He reports fatigability, chest pain at times, easy bruising, joint stiffness, joint swelling and back pain.  Balance of review of systems negative.    Medical, surgical, family and social history reviewed elsewhere in record.    Medications/Allergies: See med card    Vitals:    09/10/19 0827   BP: (!) 139/58   Pulse: 78   Resp: 18   Temp: 96.9 °F (36.1 °C)   TempSrc: Oral   SpO2: 98%   Weight: 84.1 kg (185 lb 4.8 oz)   PainSc:   4   PainLoc: Back         Physical exam:  Gen: A and O x3, pleasant,  well-groomed  Skin: No rashes or obvious lesions  HEENT: PERRLA  CVS: Regular rate and rhythm, normal S1 and S2, no murmurs.  Resp: Clear to auscultation bilaterally, no wheezes or rales.  Abdomen: Soft, NT/ND, normal bowel sounds present.  Musculoskeletal:  No antalgic gait.      Neuro:  Upper extremities: 5/5 strength bilaterally   Lower extremities: 5/5 strength bilaterally  Reflexes: Brachioradialis 2+, Bicep 2+, Tricep 2+. Patellar 2+, Achilles 2+ bilaterally  Sensory: Intact and symmetrical to light touch and pinprick in C2-T1 dermatomes bilaterally.  Intact and symmetrical to light touch and pinprick in L2-S1 dermatomes bilaterally.    Lumbar spine:  Range of motion is mildly limited with extension and full with flexion with increased pain during each maneuver.  Oblique extension causes mild increased pain on the corresponding side.  Jamshid's test is negative bilaterally.  Straight leg raise is negative bilaterally.  Internal/external rotation of hip is negative bilaterally.  Myofascial exam: Mild tenderness to palpation to the bilateral lumbar paraspinous muscles.    Right knee: Mild crepitus on range of motion with pain on passive flexion and extension, mild tenderness to superior portion of the joint, lateral portion, no redness or erythema or swelling.    Right shoulder: Range of motion is full but painful with flexion, abduction and internal/external rotation.  Empty can test positive.  Moderate generalized tenderness to palpation to the anterior and lateral shoulder.  Mild tenderness to palpation to the right scapular region.    Imagin11 Xray right knee:   THERE IS SPURRING OF THE SUPERIOR PATELLAR FACET AND NARROWING OF THE PATELLOFEMORAL JOINT SPACE. NO JOINT EFFUSION OR ACUTE OSSEOUS ABNORMALITY IS SEEN.    14 MRI lumbar spine  At the T12-L1 level, no significant disk bulge, central canal stenosis, or neural foraminal stenosis is noted. Mild ligamentous hypertrophy is noted  At the  L1-L2 level, no significant disk bulge, central canal stenosis, or neural foraminal stenosis is noted. Mild ligamentous hypertrophy is noted   At the L2-L3 level, minimal disk bulging may be noted one to 2 mm. Facet arthropathy and ligamentous hypertrophy is noted. Minimal bilateral neural foraminal narrowing is noted. The central canal is approximately 11 mm and may be minimally narrowed.  At the L3-L4 level, broad-based disk bulging appears to be present of approximately 3 to 4 mm with an asymmetric component greater towards the right neuroforamen a 4 mm that may relate to protrusion. Facet arthropathy and ligamentous hypertrophy is noted. Mild central canal stenosis is noted to 9 mm. Mild to moderate right neuroforaminal narrowing appears to present with possible contact of the exiting nerve root on the right. Mild left neural foraminal narrowing is noted.  At the L4-L5 level the disk osteophyte protrusion appears to be present paracentric to the right of approximately 5 mm. Mild to moderate central canal narrowing is noted to 8 mm. Mild to moderate right nerve foraminal narrowing is noted with mild left neuroforaminal narrowing. Contact of the exiting right nerve root may be present. Increased T2 signal is noted suggestive of an annular tear paracentric to the right  At the L5-S1 level, ligamentous hypertrophy is noted in. No significant disk bulge, central canal stenosis, or neural foraminal stenosis is noted.      Assessment:  The patient is a 68-year-old man with history of CAD and stent, hypertension, degenerative joint disease in the right shoulder and knee who presents in referral from Dr. Traore for help with pain.   1. Chronic pain of right knee     2. Chronic right shoulder pain     3. Opioid contract exists         Plan:  1.  Dr. Giron provided prescriptions for morphine 30 mg twice a day and hydrocodone-acetaminophen 10/325 mg once daily as needed for pain.  I have reviewed the [place] Board   Pharmacy website and there are no abberancies.  He plans on having a right knee replacement this year.  2.  Follow-up in 3 months or sooner as needed.     Greater than 50% of this 25 min visit was spent on counseling the patient.

## 2019-10-08 RX ORDER — RANOLAZINE 500 MG/1
500 TABLET, EXTENDED RELEASE ORAL NIGHTLY
Qty: 90 TABLET | Refills: 3 | OUTPATIENT
Start: 2019-10-08

## 2019-10-18 RX ORDER — TAMSULOSIN HYDROCHLORIDE 0.4 MG/1
CAPSULE ORAL
Qty: 90 CAPSULE | Refills: 1 | Status: SHIPPED | OUTPATIENT
Start: 2019-10-18 | End: 2019-11-15 | Stop reason: SDUPTHER

## 2019-10-24 RX ORDER — RANOLAZINE 500 MG/1
500 TABLET, EXTENDED RELEASE ORAL NIGHTLY
Qty: 90 TABLET | Refills: 3 | Status: CANCELLED | OUTPATIENT
Start: 2019-10-24

## 2019-10-24 RX ORDER — BUPROPION HYDROCHLORIDE 300 MG/1
300 TABLET ORAL DAILY
Qty: 90 TABLET | Refills: 0 | Status: CANCELLED | OUTPATIENT
Start: 2019-10-24

## 2019-11-04 RX ORDER — ATORVASTATIN CALCIUM 40 MG/1
TABLET, FILM COATED ORAL
Qty: 90 TABLET | Refills: 4 | OUTPATIENT
Start: 2019-11-04

## 2019-11-05 ENCOUNTER — TELEPHONE (OUTPATIENT)
Dept: FAMILY MEDICINE | Facility: CLINIC | Age: 69
End: 2019-11-05

## 2019-11-05 NOTE — TELEPHONE ENCOUNTER
"Spoke to patient, Ranexa not prescribed by Dr Traore, informed him to check with prescribing doctor for refills.  In July, patient was informed visit needed for further refills on wellbutrin, offered to schedule, patient stated "no thanks, I am looking to switch physicians"  "

## 2019-11-05 NOTE — TELEPHONE ENCOUNTER
----- Message from Marycruz Campos sent at 11/5/2019  2:10 PM CST -----  Contact: Debra norman/ alex script  Caller called in regards to a e prescribe for two medications for the pt. The name of the medications are RANEXA 500 mg Tb12 or buPROPion (WELLBUTRIN XL) 300 MG 24 hr tablet.   Caller can be reached at [phone].

## 2019-11-15 ENCOUNTER — TELEPHONE (OUTPATIENT)
Dept: FAMILY MEDICINE | Facility: CLINIC | Age: 69
End: 2019-11-15

## 2019-11-15 ENCOUNTER — OFFICE VISIT (OUTPATIENT)
Dept: FAMILY MEDICINE | Facility: CLINIC | Age: 69
End: 2019-11-15
Payer: MEDICARE

## 2019-11-15 VITALS
BODY MASS INDEX: 26.74 KG/M2 | TEMPERATURE: 98 F | SYSTOLIC BLOOD PRESSURE: 128 MMHG | HEART RATE: 80 BPM | WEIGHT: 191 LBS | DIASTOLIC BLOOD PRESSURE: 71 MMHG | HEIGHT: 71 IN

## 2019-11-15 DIAGNOSIS — I25.10 CAD IN NATIVE ARTERY: ICD-10-CM

## 2019-11-15 DIAGNOSIS — M51.36 DDD (DEGENERATIVE DISC DISEASE), LUMBAR: ICD-10-CM

## 2019-11-15 DIAGNOSIS — N40.1 BENIGN PROSTATIC HYPERPLASIA (BPH) WITH STRAINING ON URINATION: ICD-10-CM

## 2019-11-15 DIAGNOSIS — R39.16 BENIGN PROSTATIC HYPERPLASIA (BPH) WITH STRAINING ON URINATION: ICD-10-CM

## 2019-11-15 DIAGNOSIS — F32.5 DEPRESSION, MAJOR, IN REMISSION: Primary | ICD-10-CM

## 2019-11-15 DIAGNOSIS — Z23 INFLUENZA VACCINE NEEDED: ICD-10-CM

## 2019-11-15 DIAGNOSIS — Z12.5 ENCOUNTER FOR SCREENING FOR MALIGNANT NEOPLASM OF PROSTATE: ICD-10-CM

## 2019-11-15 DIAGNOSIS — Z00.00 ENCOUNTER FOR ANNUAL HEALTH EXAMINATION: ICD-10-CM

## 2019-11-15 DIAGNOSIS — M15.3 POST-TRAUMATIC OSTEOARTHRITIS OF MULTIPLE JOINTS: ICD-10-CM

## 2019-11-15 DIAGNOSIS — Z23 NEED FOR PNEUMOCOCCAL VACCINE: ICD-10-CM

## 2019-11-15 DIAGNOSIS — Z79.899 ENCOUNTER FOR LONG-TERM (CURRENT) USE OF HIGH-RISK MEDICATION: ICD-10-CM

## 2019-11-15 DIAGNOSIS — Z12.11 COLON CANCER SCREENING: ICD-10-CM

## 2019-11-15 PROCEDURE — 99213 PR OFFICE/OUTPT VISIT, EST, LEVL III, 20-29 MIN: ICD-10-PCS | Mod: S$PBB,,, | Performed by: INTERNAL MEDICINE

## 2019-11-15 PROCEDURE — 99213 OFFICE O/P EST LOW 20 MIN: CPT | Mod: S$PBB,,, | Performed by: INTERNAL MEDICINE

## 2019-11-15 PROCEDURE — 99999 PR PBB SHADOW E&M-EST. PATIENT-LVL III: ICD-10-PCS | Mod: PBBFAC,,, | Performed by: INTERNAL MEDICINE

## 2019-11-15 PROCEDURE — 99213 OFFICE O/P EST LOW 20 MIN: CPT | Mod: PBBFAC,PO | Performed by: INTERNAL MEDICINE

## 2019-11-15 PROCEDURE — 99999 PR PBB SHADOW E&M-EST. PATIENT-LVL III: CPT | Mod: PBBFAC,,, | Performed by: INTERNAL MEDICINE

## 2019-11-15 RX ORDER — RANOLAZINE 500 MG/1
500 TABLET, EXTENDED RELEASE ORAL NIGHTLY
Qty: 90 TABLET | Refills: 1 | Status: SHIPPED | OUTPATIENT
Start: 2019-11-15 | End: 2020-04-06

## 2019-11-15 RX ORDER — TAMSULOSIN HYDROCHLORIDE 0.4 MG/1
0.8 CAPSULE ORAL NIGHTLY
Qty: 180 CAPSULE | Refills: 1 | Status: SHIPPED | OUTPATIENT
Start: 2019-11-15 | End: 2020-06-22

## 2019-11-15 RX ORDER — LISINOPRIL 5 MG/1
5 TABLET ORAL DAILY
Qty: 90 TABLET | Refills: 3 | Status: SHIPPED | OUTPATIENT
Start: 2019-11-15 | End: 2020-11-12

## 2019-11-15 RX ORDER — METOPROLOL SUCCINATE 25 MG/1
12.5 TABLET, EXTENDED RELEASE ORAL DAILY
Qty: 45 TABLET | Refills: 1 | Status: SHIPPED | OUTPATIENT
Start: 2019-11-15 | End: 2020-05-13

## 2019-11-15 RX ORDER — ATORVASTATIN CALCIUM 40 MG/1
40 TABLET, FILM COATED ORAL DAILY
Qty: 90 TABLET | Refills: 1 | Status: SHIPPED | OUTPATIENT
Start: 2019-11-15 | End: 2020-05-13

## 2019-11-15 RX ORDER — BUPROPION HYDROCHLORIDE 300 MG/1
300 TABLET ORAL DAILY
Qty: 90 TABLET | Refills: 1 | Status: SHIPPED | OUTPATIENT
Start: 2019-11-15 | End: 2020-04-06

## 2019-11-15 NOTE — PROGRESS NOTES
Assessment/Plan:    CAD in native artery  S/p PCI x 2. Also with hx of stable angina, on ranexa.  Followed by Dr. Spain. On ASA/statin and metoprolol    DDD (degenerative disc disease), lumbar  Followed by Dr. Giron. Has pain contract with him for chronic opiates    Encounter for annual health examination  Complete history and physical was completed today.  Complete and thorough medication reconciliation was performed.  Discussed risks and benefits of medications.  Advised patient on orders and health maintenance.  We discussed old records and old labs if available.  Will request any records not available through epic.  Continue current medications listed on your summary sheet.    Benign prostatic hyperplasia (BPH) with straining on urination  Hx of BPH. Currently on flomax 0.4mg. Having some increased straining. Increase to 0.8mg daily. Check PSA today. Declined urology referral.    Depression, major, in remission  Currently on wellbutrin with control of symptoms    ______________________________________________________________________________________________________________________________    Orders this visit:    Depression, major, in remission  -     buPROPion (WELLBUTRIN XL) 300 MG 24 hr tablet; Take 1 tablet (300 mg total) by mouth once daily.  Dispense: 90 tablet; Refill: 1    CAD in native artery  -     atorvastatin (LIPITOR) 40 MG tablet; Take 1 tablet (40 mg total) by mouth once daily.  Dispense: 90 tablet; Refill: 1  -     lisinopril (PRINIVIL,ZESTRIL) 5 MG tablet; Take 1 tablet (5 mg total) by mouth once daily.  Dispense: 90 tablet; Refill: 3  -     metoprolol succinate (TOPROL-XL) 25 MG 24 hr tablet; Take 0.5 tablets (12.5 mg total) by mouth once daily.  Dispense: 45 tablet; Refill: 1  -     ranolazine (RANEXA) 500 MG Tb12; Take 1 tablet (500 mg total) by mouth nightly.  Dispense: 90 tablet; Refill: 1    Post-traumatic osteoarthritis of multiple joints    Benign prostatic hyperplasia (BPH) with  straining on urination  -     tamsulosin (FLOMAX) 0.4 mg Cap; Take 2 capsules (0.8 mg total) by mouth every evening.  Dispense: 180 capsule; Refill: 1  -     PSA, Screening; Future; Expected date: 11/15/2019    Influenza vaccine needed  -     Influenza - Quadrivalent (PF)    Need for pneumococcal vaccine  -     Pneumococcal Conjugate Vaccine (13 Valent) (IM)    Encounter for annual health examination  -     Comprehensive metabolic panel; Future; Expected date: 11/15/2019  -     PSA, Screening; Future; Expected date: 11/15/2019  -     Lipid panel; Future; Expected date: 11/15/2019    Encounter for long-term (current) use of high-risk medication  -     Comprehensive metabolic panel; Future; Expected date: 11/15/2019  -     Lipid panel; Future; Expected date: 11/15/2019    Encounter for screening for malignant neoplasm of prostate   -     PSA, Screening; Future; Expected date: 11/15/2019    Colon cancer screening  -     Fecal Immunochemical Test (iFOBT); Future; Expected date: 11/15/2019    DDD (degenerative disc disease), lumbar      Follow up in about 6 months (around 5/15/2020).    Iveth Stafford MD  ______________________________________________________________________________________________________________________________    HPI:    Patient is a new patient to me, here to establish care and to discuss chronic medical conditions.  Patient is a 68-year-old male with a past medical history of CAD, status post PCI x2, chronic back pain, on chronic opiates, depression and BPH.  Patient is followed by pain management for his chronic pain.  He is also followed by Cardiology for his heart disease.    Patient with no complaints except some increased straining with urination.  He is current on Flomax 0.4 mg.  States has been present for quite a while.  Declines Urology referral.  Is interested in increasing dose.    Her routine health maintenance discussed.  Plan to obtain fit test.  Routine vaccinations administered  today.    Past Medical History:  Past Medical History:   Diagnosis Date    BPH (benign prostatic hypertrophy) with urinary obstruction     CAD (coronary artery disease) May and Yarely 2011    x2    Chronic pain     DDD (degenerative disc disease), lumbar     Depression, major, in remission     Hyperlipidemia LDL goal < 100     Hypertension     MRSA cellulitis     Osteoarthritis      Past Surgical History:   Procedure Laterality Date    CORONARY ANGIOPLASTY      2 Stents placed a few year ago    Epidural steroid injection      Pain management    INJURY      KNEE SURGERY      Right, x2    MANDIBLE FRACTURE SURGERY      accident during  service    REPAIR OF LIGAMENT OF SHOULDER Right     ROTATOR CUFF REPAIR      right times 2    VASECTOMY       Review of patient's allergies indicates:   Allergen Reactions    Acetaminophen-codeine Rash     #3    Codeine Nausea Only     Social History     Tobacco Use    Smoking status: Former Smoker     Packs/day: 1.00     Years: 25.00     Pack years: 25.00     Last attempt to quit: 2007     Years since quittin.5    Smokeless tobacco: Never Used   Substance Use Topics    Alcohol use: No    Drug use: Never     Family History   Problem Relation Age of Onset    Emphysema Mother     Cancer Mother         unknown    Heart failure Mother     Prostate cancer Brother     Diabetes Paternal Aunt      Current Outpatient Medications on File Prior to Visit   Medication Sig Dispense Refill    aspirin (ECOTRIN) 81 MG EC tablet Take 81 mg by mouth once daily.       cyclobenzaprine (FLEXERIL) 10 MG tablet TAKE 1 TABLET NIGHTLY AS NEEDED FOR MUSCLE SPASMS 90 tablet 3    HYDROcodone-acetaminophen (NORCO)  mg per tablet Take 1 tablet by mouth daily as needed. 30 tablet 0    [START ON 2019] HYDROcodone-acetaminophen (NORCO)  mg per tablet Take 1 tablet by mouth daily as needed. 30 tablet 0    morphine (MS CONTIN) 30 MG 12 hr tablet Take 1  "tablet (30 mg total) by mouth 2 (two) times daily. 60 tablet 0    [START ON 11/24/2019] morphine (MS CONTIN) 30 MG 12 hr tablet Take 1 tablet (30 mg total) by mouth 2 (two) times daily. 60 tablet 0    [DISCONTINUED] atorvastatin (LIPITOR) 40 MG tablet TAKE 1 TABLET DAILY 90 tablet 0    [DISCONTINUED] buPROPion (WELLBUTRIN XL) 300 MG 24 hr tablet TAKE 1 TABLET DAILY 90 tablet 0    [DISCONTINUED] lisinopril (PRINIVIL,ZESTRIL) 5 MG tablet TAKE 1 TABLET DAILY 90 tablet 3    [DISCONTINUED] metoprolol succinate (TOPROL-XL) 25 MG 24 hr tablet TAKE ONE-HALF (1/2) TABLET DAILY 45 tablet 2    [DISCONTINUED] tamsulosin (FLOMAX) 0.4 mg Cap TAKE 1 CAPSULE EVERY EVENING 90 capsule 1    [DISCONTINUED] cetirizine (ZYRTEC) 10 MG tablet Take 1 tablet (10 mg total) by mouth once daily. for 5 days 5 tablet 0    [DISCONTINUED] RANEXA 500 mg Tb12 TAKE 1 TABLET NIGHTLY (Patient not taking: Reported on 11/15/2019) 90 tablet 3     No current facility-administered medications on file prior to visit.        Review of Systems   Constitutional: Negative for chills, diaphoresis, fatigue and fever.   HENT: Negative for congestion, ear pain, postnasal drip, sinus pain and sore throat.    Eyes: Negative for pain and redness.   Respiratory: Negative for cough, chest tightness and shortness of breath.    Cardiovascular: Negative for chest pain and leg swelling.   Gastrointestinal: Negative for abdominal pain, constipation, diarrhea, nausea and vomiting.   Genitourinary: Positive for decreased urine volume. Negative for dysuria and hematuria.   Musculoskeletal: Negative for arthralgias and joint swelling.   Skin: Negative for rash.   Neurological: Negative for dizziness, syncope and headaches.       Vitals:    11/15/19 1045   BP: 128/71   Pulse: 80   Temp: 97.8 °F (36.6 °C)   Weight: 86.6 kg (191 lb)   Height: 5' 11" (1.803 m)       Wt Readings from Last 3 Encounters:   11/15/19 86.6 kg (191 lb)   09/10/19 84.1 kg (185 lb 4.8 oz)   06/10/19 " 86.7 kg (191 lb 2.2 oz)       Physical Exam   Constitutional: He is oriented to person, place, and time. He appears well-developed and well-nourished. No distress.   HENT:   Head: Normocephalic and atraumatic.   Eyes: Conjunctivae and EOM are normal.   Neck: Normal range of motion. Neck supple.   Cardiovascular: Normal rate, regular rhythm, normal heart sounds and intact distal pulses.   No murmur heard.  Pulmonary/Chest: Effort normal and breath sounds normal. No respiratory distress.   Abdominal: Soft. Bowel sounds are normal. He exhibits no distension. There is no tenderness.   Musculoskeletal: Normal range of motion.   Neurological: He is alert and oriented to person, place, and time.   Skin: Skin is warm and dry. No rash noted.   Psychiatric: He has a normal mood and affect.

## 2019-11-15 NOTE — ASSESSMENT & PLAN NOTE
S/p PCI x 2. Also with hx of stable angina, on ranexa.  Followed by Dr. Spain. On ASA/statin and metoprolol

## 2019-11-15 NOTE — ASSESSMENT & PLAN NOTE
Hx of BPH. Currently on flomax 0.4mg. Having some increased straining. Increase to 0.8mg daily. Check PSA today. Declined urology referral.

## 2019-11-18 ENCOUNTER — TELEPHONE (OUTPATIENT)
Dept: FAMILY MEDICINE | Facility: CLINIC | Age: 69
End: 2019-11-18

## 2019-11-18 NOTE — TELEPHONE ENCOUNTER
----- Message from Hunter Peraza sent at 11/18/2019  4:41 PM CST -----  Contact: pt wife - Iris   States she's calling to get his flu shot on the 20th instead of the 22nd and can be reached at [phone]//thanks/dbw      Pt wife comes in on the 20th for an appt

## 2019-11-18 NOTE — TELEPHONE ENCOUNTER
Spoke with pts wife she states he wanted to come in on the 20th to get it. Let him know the ancillary nurse is not here on Wed. Pt wife states he may get it done at the pharmacy. She will let us know.

## 2019-11-20 ENCOUNTER — TELEPHONE (OUTPATIENT)
Dept: FAMILY MEDICINE | Facility: CLINIC | Age: 69
End: 2019-11-20

## 2019-11-20 NOTE — TELEPHONE ENCOUNTER
----- Message from Marycruz Campos sent at 11/20/2019  4:44 PM CST -----  Contact: Pt spouse`  Caller called in regards to rescheduling flu shot. Pt can be reached at [phone].

## 2019-11-27 ENCOUNTER — OFFICE VISIT (OUTPATIENT)
Dept: CARDIOLOGY | Facility: CLINIC | Age: 69
End: 2019-11-27
Payer: MEDICARE

## 2019-11-27 VITALS
HEIGHT: 71 IN | HEART RATE: 76 BPM | BODY MASS INDEX: 26.76 KG/M2 | WEIGHT: 191.13 LBS | DIASTOLIC BLOOD PRESSURE: 72 MMHG | SYSTOLIC BLOOD PRESSURE: 119 MMHG

## 2019-11-27 DIAGNOSIS — I25.10 CAD IN NATIVE ARTERY: ICD-10-CM

## 2019-11-27 DIAGNOSIS — E78.5 HYPERLIPIDEMIA WITH TARGET LDL LESS THAN 100: ICD-10-CM

## 2019-11-27 DIAGNOSIS — Z95.5 STATUS POST CORONARY ARTERY STENT PLACEMENT: Primary | ICD-10-CM

## 2019-11-27 PROCEDURE — 99999 PR PBB SHADOW E&M-EST. PATIENT-LVL III: ICD-10-PCS | Mod: PBBFAC,,, | Performed by: INTERNAL MEDICINE

## 2019-11-27 PROCEDURE — 1159F PR MEDICATION LIST DOCUMENTED IN MEDICAL RECORD: ICD-10-PCS | Mod: ,,, | Performed by: INTERNAL MEDICINE

## 2019-11-27 PROCEDURE — 99214 OFFICE O/P EST MOD 30 MIN: CPT | Mod: S$PBB,,, | Performed by: INTERNAL MEDICINE

## 2019-11-27 PROCEDURE — 99214 PR OFFICE/OUTPT VISIT, EST, LEVL IV, 30-39 MIN: ICD-10-PCS | Mod: S$PBB,,, | Performed by: INTERNAL MEDICINE

## 2019-11-27 PROCEDURE — 99999 PR PBB SHADOW E&M-EST. PATIENT-LVL III: CPT | Mod: PBBFAC,,, | Performed by: INTERNAL MEDICINE

## 2019-11-27 PROCEDURE — 1126F PR PAIN SEVERITY QUANTIFIED, NO PAIN PRESENT: ICD-10-PCS | Mod: ,,, | Performed by: INTERNAL MEDICINE

## 2019-11-27 PROCEDURE — 1159F MED LIST DOCD IN RCRD: CPT | Mod: ,,, | Performed by: INTERNAL MEDICINE

## 2019-11-27 PROCEDURE — 1126F AMNT PAIN NOTED NONE PRSNT: CPT | Mod: ,,, | Performed by: INTERNAL MEDICINE

## 2019-11-27 PROCEDURE — 99213 OFFICE O/P EST LOW 20 MIN: CPT | Mod: PBBFAC,PO | Performed by: INTERNAL MEDICINE

## 2019-11-27 NOTE — PROGRESS NOTES
Subjective:    Patient ID:  Luigi Murillo is a 68 y.o. male who presents for follow-up of cad    HPI  He comes for follow up with no major problems, no chest pain, no shortness of breath.  FC I-II    Review of Systems   Constitution: Negative for decreased appetite, malaise/fatigue, weight gain and weight loss.   Cardiovascular: Negative for chest pain, dyspnea on exertion, leg swelling, palpitations and syncope.   Respiratory: Negative for cough and shortness of breath.    Gastrointestinal: Negative.    Neurological: Negative for weakness.   All other systems reviewed and are negative.       Objective:      Physical Exam   Constitutional: He is oriented to person, place, and time. He appears well-developed and well-nourished.   HENT:   Head: Normocephalic.   Eyes: Pupils are equal, round, and reactive to light.   Neck: Normal range of motion. Neck supple. No JVD present. Carotid bruit is not present. No thyromegaly present.   Cardiovascular: Normal rate, regular rhythm, normal heart sounds, intact distal pulses and normal pulses. PMI is not displaced. Exam reveals no gallop.   No murmur heard.  Pulmonary/Chest: Effort normal and breath sounds normal.   Abdominal: Soft. Normal appearance. He exhibits no mass. There is no hepatosplenomegaly. There is no tenderness.   Musculoskeletal: Normal range of motion. He exhibits no edema.   Neurological: He is alert and oriented to person, place, and time. He has normal strength and normal reflexes. No sensory deficit.   Skin: Skin is warm and intact.   Psychiatric: He has a normal mood and affect.   Nursing note and vitals reviewed.          Assessment:       1. Status post coronary artery stent placement    2. CAD in native artery    3. Hyperlipidemia with target LDL less than 100         Plan:     Continue all cardiac medications  Regular exercise program  1 yr f/u with stress echo, labs

## 2019-11-29 ENCOUNTER — APPOINTMENT (OUTPATIENT)
Dept: LAB | Facility: HOSPITAL | Age: 69
End: 2019-11-29
Attending: INTERNAL MEDICINE
Payer: MEDICARE

## 2019-11-29 ENCOUNTER — CLINICAL SUPPORT (OUTPATIENT)
Dept: FAMILY MEDICINE | Facility: CLINIC | Age: 69
End: 2019-11-29
Payer: MEDICARE

## 2019-11-29 DIAGNOSIS — Z12.11 COLON CANCER SCREENING: ICD-10-CM

## 2019-11-29 DIAGNOSIS — Z23 IMMUNIZATION DUE: Primary | ICD-10-CM

## 2019-11-29 DIAGNOSIS — Z23 NEEDS FLU SHOT: ICD-10-CM

## 2019-11-29 PROCEDURE — G0009 ADMIN PNEUMOCOCCAL VACCINE: HCPCS | Mod: PBBFAC,PO

## 2019-11-29 PROCEDURE — 99212 OFFICE O/P EST SF 10 MIN: CPT | Mod: PBBFAC,25,PO

## 2019-11-29 PROCEDURE — 82274 ASSAY TEST FOR BLOOD FECAL: CPT

## 2019-11-29 PROCEDURE — 99999 PR PBB SHADOW E&M-EST. PATIENT-LVL II: ICD-10-PCS | Mod: PBBFAC,,,

## 2019-11-29 PROCEDURE — 90662 IIV NO PRSV INCREASED AG IM: CPT | Mod: PBBFAC,PO

## 2019-11-29 PROCEDURE — 99999 PR PBB SHADOW E&M-EST. PATIENT-LVL II: CPT | Mod: PBBFAC,,,

## 2019-11-29 NOTE — PROGRESS NOTES
Administered Flu vaccine IM to right deltoid. Pt tolerated well. Administered Pneumococcal vaccine IM to left deltoid. Pt tolerated well.

## 2019-12-03 LAB — HEMOCCULT STL QL IA: POSITIVE

## 2019-12-04 ENCOUNTER — TELEPHONE (OUTPATIENT)
Dept: FAMILY MEDICINE | Facility: CLINIC | Age: 69
End: 2019-12-04

## 2019-12-04 DIAGNOSIS — R19.5 POSITIVE FIT (FECAL IMMUNOCHEMICAL TEST): Primary | ICD-10-CM

## 2019-12-04 NOTE — TELEPHONE ENCOUNTER
FIT stool test positive. Patient needs follow up with a colonoscopy. If ok with patient, would like to order this with Dr. Estes

## 2019-12-05 ENCOUNTER — TELEPHONE (OUTPATIENT)
Dept: ENDOSCOPY | Facility: HOSPITAL | Age: 69
End: 2019-12-05

## 2019-12-05 NOTE — TELEPHONE ENCOUNTER
Attempted to schedule procedure, was told that patient was not available.  Left message to call office, number provided.

## 2019-12-10 ENCOUNTER — TELEPHONE (OUTPATIENT)
Dept: PAIN MEDICINE | Facility: CLINIC | Age: 69
End: 2019-12-10

## 2019-12-10 ENCOUNTER — TELEPHONE (OUTPATIENT)
Dept: FAMILY MEDICINE | Facility: CLINIC | Age: 69
End: 2019-12-10

## 2019-12-10 NOTE — TELEPHONE ENCOUNTER
----- Message from Iveth Stafford MD sent at 12/9/2019  9:34 PM CST -----  This patient had a positive FIT test. Diagnostic scope order was placed. Endoscopy trying to get in touch with him to schedule procedure. Can we try to get in touch with him to make sure he is aware that they are trying to contact him?  Thx!

## 2019-12-10 NOTE — TELEPHONE ENCOUNTER
Spoke with pt, he stated that his wife spoke with [facility] regarding Colonoscopy. She said that she has been very sick and they are having transportation issues, will schedule later.

## 2019-12-11 ENCOUNTER — OFFICE VISIT (OUTPATIENT)
Dept: PAIN MEDICINE | Facility: CLINIC | Age: 69
End: 2019-12-11
Payer: MEDICARE

## 2019-12-11 VITALS
TEMPERATURE: 98 F | DIASTOLIC BLOOD PRESSURE: 78 MMHG | RESPIRATION RATE: 18 BRPM | BODY MASS INDEX: 27.1 KG/M2 | SYSTOLIC BLOOD PRESSURE: 123 MMHG | HEART RATE: 84 BPM | WEIGHT: 194.31 LBS

## 2019-12-11 DIAGNOSIS — Z79.891 OPIOID CONTRACT EXISTS: ICD-10-CM

## 2019-12-11 DIAGNOSIS — G89.29 CHRONIC PAIN OF RIGHT KNEE: Primary | ICD-10-CM

## 2019-12-11 DIAGNOSIS — G89.29 CHRONIC RIGHT SHOULDER PAIN: ICD-10-CM

## 2019-12-11 DIAGNOSIS — M25.511 CHRONIC RIGHT SHOULDER PAIN: ICD-10-CM

## 2019-12-11 DIAGNOSIS — M25.561 CHRONIC PAIN OF RIGHT KNEE: Primary | ICD-10-CM

## 2019-12-11 DIAGNOSIS — G89.4 CHRONIC PAIN SYNDROME: ICD-10-CM

## 2019-12-11 PROCEDURE — 99999 PR PBB SHADOW E&M-EST. PATIENT-LVL IV: CPT | Mod: PBBFAC,,, | Performed by: PHYSICIAN ASSISTANT

## 2019-12-11 PROCEDURE — 99999 PR PBB SHADOW E&M-EST. PATIENT-LVL IV: ICD-10-PCS | Mod: PBBFAC,,, | Performed by: PHYSICIAN ASSISTANT

## 2019-12-11 PROCEDURE — 1125F PR PAIN SEVERITY QUANTIFIED, PAIN PRESENT: ICD-10-PCS | Mod: ,,, | Performed by: PHYSICIAN ASSISTANT

## 2019-12-11 PROCEDURE — 99213 PR OFFICE/OUTPT VISIT, EST, LEVL III, 20-29 MIN: ICD-10-PCS | Mod: S$PBB,,, | Performed by: PHYSICIAN ASSISTANT

## 2019-12-11 PROCEDURE — 99214 OFFICE O/P EST MOD 30 MIN: CPT | Mod: PBBFAC,PN | Performed by: PHYSICIAN ASSISTANT

## 2019-12-11 PROCEDURE — 1159F MED LIST DOCD IN RCRD: CPT | Mod: ,,, | Performed by: PHYSICIAN ASSISTANT

## 2019-12-11 PROCEDURE — 99213 OFFICE O/P EST LOW 20 MIN: CPT | Mod: S$PBB,,, | Performed by: PHYSICIAN ASSISTANT

## 2019-12-11 PROCEDURE — 1159F PR MEDICATION LIST DOCUMENTED IN MEDICAL RECORD: ICD-10-PCS | Mod: ,,, | Performed by: PHYSICIAN ASSISTANT

## 2019-12-11 PROCEDURE — 1125F AMNT PAIN NOTED PAIN PRSNT: CPT | Mod: ,,, | Performed by: PHYSICIAN ASSISTANT

## 2019-12-11 RX ORDER — MORPHINE SULFATE 30 MG/1
30 TABLET, FILM COATED, EXTENDED RELEASE ORAL 2 TIMES DAILY
Qty: 60 TABLET | Refills: 0 | Status: SHIPPED | OUTPATIENT
Start: 2020-02-22 | End: 2020-03-11 | Stop reason: SDUPTHER

## 2019-12-11 RX ORDER — HYDROCODONE BITARTRATE AND ACETAMINOPHEN 10; 325 MG/1; MG/1
1 TABLET ORAL DAILY PRN
Qty: 30 TABLET | Refills: 0 | Status: SHIPPED | OUTPATIENT
Start: 2020-02-19 | End: 2020-03-11 | Stop reason: SDUPTHER

## 2019-12-11 RX ORDER — HYDROCODONE BITARTRATE AND ACETAMINOPHEN 10; 325 MG/1; MG/1
1 TABLET ORAL DAILY PRN
Qty: 30 TABLET | Refills: 0 | Status: SHIPPED | OUTPATIENT
Start: 2019-12-21 | End: 2020-01-20

## 2019-12-11 RX ORDER — MORPHINE SULFATE 30 MG/1
30 TABLET, FILM COATED, EXTENDED RELEASE ORAL 2 TIMES DAILY
Qty: 60 TABLET | Refills: 0 | Status: SHIPPED | OUTPATIENT
Start: 2020-01-23 | End: 2020-02-22

## 2019-12-11 RX ORDER — HYDROCODONE BITARTRATE AND ACETAMINOPHEN 10; 325 MG/1; MG/1
1 TABLET ORAL DAILY PRN
Qty: 30 TABLET | Refills: 0 | Status: SHIPPED | OUTPATIENT
Start: 2020-01-20 | End: 2020-02-19

## 2019-12-11 RX ORDER — MORPHINE SULFATE 30 MG/1
30 TABLET, FILM COATED, EXTENDED RELEASE ORAL 2 TIMES DAILY
Qty: 60 TABLET | Refills: 0 | Status: SHIPPED | OUTPATIENT
Start: 2019-12-24 | End: 2020-01-23

## 2019-12-11 NOTE — PROGRESS NOTES
This note was completed with dictation software and grammatical errors may exist.    CC:Knee pain, shoulder pain    HPI: The patient is a 69-year-old man with history of CAD and stent, hypertension, degenerative joint disease in the right shoulder and knee who presents in referral from Dr. Traore for help with pain.  He returns in follow-up today with right knee and right shoulder pain.  He denies any major changes to this.  He had to postpone having his right knee replacement this year because he states that he had positive stool occult blood and has to have a colonoscopy soon.  He continues to take pain medication with relief.  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    Pain intervention history:  He is status post right L3 and L4 transforaminal epidural steroid injections on 3/14/14 with 85% relief at first, now reporting what sounds like about 25% relief.  He is status post right L3 and L4 transforaminal epidural steroid injection on 6/4/14 with 0% relief.  He is status post right knee geniculate nerve block on 11/4/16 with 0% relief so radiofrequency ablation was not scheduled.    Urine drug screen on 1/22/14 was positive for buprenorphine and tramadol but negative for hydrocodone which he was apparently taking.    Repeat drug screen on 2/20/14 again positive for buprenorphine and tramadol but negative for hydrocodone.  5/20/14 and 6/25/14 urine drug screens both consistent.    ROS:He reports fatigability, chest pain at times, easy bruising, joint stiffness, joint swelling and back pain.  Balance of review of systems negative.    Medical, surgical, family and social history reviewed elsewhere in record.    Medications/Allergies: See med card    Vitals:    12/11/19 0915   BP: 123/78   Pulse: 84   Resp: 18   Temp: 97.5 °F (36.4 °C)   TempSrc: Oral   Weight: 88.2 kg (194 lb 5.4 oz)   PainSc:   4   PainLoc: Back         Physical exam:  Gen: A and O x3, pleasant,  well-groomed  Skin: No rashes or obvious lesions  HEENT: PERRLA  CVS: Regular rate and rhythm, normal S1 and S2, no murmurs.  Resp: Clear to auscultation bilaterally, no wheezes or rales.  Abdomen: Soft, NT/ND, normal bowel sounds present.  Musculoskeletal:  No antalgic gait.      Neuro:  Upper extremities: 5/5 strength bilaterally   Lower extremities: 5/5 strength bilaterally  Reflexes: Brachioradialis 2+, Bicep 2+, Tricep 2+. Patellar 2+, Achilles 2+ bilaterally  Sensory: Intact and symmetrical to light touch and pinprick in C2-T1 dermatomes bilaterally.  Intact and symmetrical to light touch and pinprick in L2-S1 dermatomes bilaterally.    Lumbar spine:  Range of motion is mildly limited with extension and full with flexion with increased pain during each maneuver.  Oblique extension causes mild increased pain on the corresponding side.  Jamshid's test is negative bilaterally.  Straight leg raise is negative bilaterally.  Internal/external rotation of hip is negative bilaterally.  Myofascial exam: Mild tenderness to palpation to the bilateral lumbar paraspinous muscles.    Right knee: Mild crepitus on range of motion with pain on passive flexion and extension, mild tenderness to superior portion of the joint, lateral portion, no redness or erythema or swelling.    Right shoulder: Range of motion is full but painful with flexion, abduction and internal/external rotation.  Empty can test positive.  Moderate generalized tenderness to palpation to the anterior and lateral shoulder.  Mild tenderness to palpation to the right scapular region.    Imagin11 Xray right knee:   THERE IS SPURRING OF THE SUPERIOR PATELLAR FACET AND NARROWING OF THE PATELLOFEMORAL JOINT SPACE. NO JOINT EFFUSION OR ACUTE OSSEOUS ABNORMALITY IS SEEN.    14 MRI lumbar spine  At the T12-L1 level, no significant disk bulge, central canal stenosis, or neural foraminal stenosis is noted. Mild ligamentous hypertrophy is noted  At the  L1-L2 level, no significant disk bulge, central canal stenosis, or neural foraminal stenosis is noted. Mild ligamentous hypertrophy is noted   At the L2-L3 level, minimal disk bulging may be noted one to 2 mm. Facet arthropathy and ligamentous hypertrophy is noted. Minimal bilateral neural foraminal narrowing is noted. The central canal is approximately 11 mm and may be minimally narrowed.  At the L3-L4 level, broad-based disk bulging appears to be present of approximately 3 to 4 mm with an asymmetric component greater towards the right neuroforamen a 4 mm that may relate to protrusion. Facet arthropathy and ligamentous hypertrophy is noted. Mild central canal stenosis is noted to 9 mm. Mild to moderate right neuroforaminal narrowing appears to present with possible contact of the exiting nerve root on the right. Mild left neural foraminal narrowing is noted.  At the L4-L5 level the disk osteophyte protrusion appears to be present paracentric to the right of approximately 5 mm. Mild to moderate central canal narrowing is noted to 8 mm. Mild to moderate right nerve foraminal narrowing is noted with mild left neuroforaminal narrowing. Contact of the exiting right nerve root may be present. Increased T2 signal is noted suggestive of an annular tear paracentric to the right  At the L5-S1 level, ligamentous hypertrophy is noted in. No significant disk bulge, central canal stenosis, or neural foraminal stenosis is noted.      Assessment:  The patient is a 69-year-old man with history of CAD and stent, hypertension, degenerative joint disease in the right shoulder and knee who presents in referral from Dr. Traore for help with pain.   1. Chronic pain of right knee     2. Chronic right shoulder pain     3. Chronic pain syndrome     4. Opioid contract exists         Plan:  1.  Dr. Giron provided prescriptions for morphine 30 mg twice a day and hydrocodone-acetaminophen 10/325 mg once daily as needed for pain.  I have  reviewed the [place] Board of Pharmacy website and there are no abberancies.    2.  Follow-up in 3 months or sooner as needed.     Greater than 50% of this 15 min visit was spent on counseling the patient.

## 2019-12-12 ENCOUNTER — TELEPHONE (OUTPATIENT)
Dept: FAMILY MEDICINE | Facility: CLINIC | Age: 69
End: 2019-12-12

## 2019-12-12 ENCOUNTER — TELEPHONE (OUTPATIENT)
Dept: PAIN MEDICINE | Facility: CLINIC | Age: 69
End: 2019-12-12

## 2019-12-12 ENCOUNTER — TELEPHONE (OUTPATIENT)
Dept: ENDOSCOPY | Facility: HOSPITAL | Age: 69
End: 2019-12-12

## 2019-12-12 NOTE — TELEPHONE ENCOUNTER
Endoscopy Scheduling Questionnaire:    1. Have you been admitted overnight to the hospital in the past 3 months? no  2. Have you had a cardiac stent placed in the past 12 months? no  3. Have you had a stroke or heart attack in the past 6 months? no  4. Have you had any chest pain in the past 3 months? no      If so, have you been evaluated by your PCP or Cardiologist? no  5. Do you take prescription weight loss medication? no  6. Have you been diagnosed with Diverticulitis within the past 3 months? no  7. Are you on dialysis? no  8. Are you diabetic? no Do you have an insulin pump? no  9. Do you have any other health issues that you feel might limit your ability to safely have the procedure and/or sedation? no  10. Is the patient over 79 yo? no        If so, has the patient been seen by their PCP or GI in the last 6 months? N/A       -I have reviewed the last colonoscopy for recommendations regarding surveillance and bowel prep  is NA  -I have reviewed the patient's medications and allergies. He is not on high risk medication, A clearance request NA  -I have verified the pharmacy information. The prep being used is Suprep. The patient's prep instructions were sent via [facility] patient portal..    Date Endoscopy Scheduled: Pt will call to schedule in [place]. Per pt Fit Test came back positive.

## 2019-12-12 NOTE — TELEPHONE ENCOUNTER
Spoke with patient and he states that his short acting medication, hydrocodone is not working well to control his breakthrough pain. He was active today and realized that he did not discuss this with you.

## 2019-12-12 NOTE — TELEPHONE ENCOUNTER
----- Message from Armen Goode sent at 12/12/2019  9:50 AM CST -----  Contact: ernestine   Wanted to let  know that was unable to get colonoscopy mayank until feb in [place] and unable to get scheduled in [place]. Pls return call to advise.           [phone]

## 2019-12-12 NOTE — TELEPHONE ENCOUNTER
Please call the patient and see what he is asking for.  He is already on a long-acting and a short-acting for breakthrough pain. I saw him yesterday and he did not mention this to me.

## 2019-12-12 NOTE — TELEPHONE ENCOUNTER
----- Message from Linette Cantrell sent at 12/12/2019 10:57 AM CST -----  Contact: Spouse Iris  Type: Needs Medical Advice    Who Called:  Pt  Best Call Back Number: [phone]  Additional Information: Requesting a call back regarding if the doctor would send something strong for the break through pain  Pt surgery is schedule for 01/22/2020  Please Advise ---Thank you

## 2019-12-13 ENCOUNTER — TELEPHONE (OUTPATIENT)
Dept: FAMILY MEDICINE | Facility: CLINIC | Age: 69
End: 2019-12-13

## 2019-12-13 ENCOUNTER — PATIENT MESSAGE (OUTPATIENT)
Dept: ENDOSCOPY | Facility: HOSPITAL | Age: 69
End: 2019-12-13

## 2019-12-13 ENCOUNTER — PATIENT MESSAGE (OUTPATIENT)
Dept: FAMILY MEDICINE | Facility: CLINIC | Age: 69
End: 2019-12-13

## 2019-12-13 RX ORDER — SODIUM, POTASSIUM,MAG SULFATES 17.5-3.13G
1 SOLUTION, RECONSTITUTED, ORAL ORAL DAILY
Qty: 1 KIT | Refills: 0 | Status: CANCELLED | OUTPATIENT
Start: 2019-12-13 | End: 2019-12-15

## 2019-12-13 RX ORDER — SODIUM, POTASSIUM,MAG SULFATES 17.5-3.13G
1 SOLUTION, RECONSTITUTED, ORAL ORAL DAILY
Qty: 1 KIT | Refills: 0 | Status: SHIPPED | OUTPATIENT
Start: 2019-12-13 | End: 2019-12-15

## 2019-12-13 RX ORDER — SODIUM, POTASSIUM,MAG SULFATES 17.5-3.13G
1 SOLUTION, RECONSTITUTED, ORAL ORAL DAILY
Qty: 1 KIT | Refills: 0 | OUTPATIENT
Start: 2019-12-13 | End: 2019-12-15

## 2019-12-13 NOTE — TELEPHONE ENCOUNTER
Dr. Estes/ Jonh,    José Antonio afternoon,    Luigi Murillo has special circumstances surrounding this appointment. Pt only has transportation on Mon/Tues. His wife is legally blind. The date 01- did not fall in line with his means of transportation. Pt was scheduled on 12-17-19 in a cancellation slot with Dr. Negrete.  Please call with any questions. Thank you.    Raquel Guillermo  Endoscopy Scheduling  Ext [phone]

## 2019-12-13 NOTE — TELEPHONE ENCOUNTER
Spoke with pt. No luck in scheduling procedure in [place]. Dr. Negrete had a cancellation on 12-17-19 @7/8am. Pt needed to be scheduled on Mon/Tuesday because that's the only days he has transportation. His wife is legally blind. Suprep will be sent to [facility] [phone]. dw

## 2019-12-13 NOTE — TELEPHONE ENCOUNTER
Can you get this patient's cscope changed to me on 1/9/2019 where I see an opening later in the day?  Dr. Pérez

## 2019-12-17 ENCOUNTER — ANESTHESIA (OUTPATIENT)
Dept: ENDOSCOPY | Facility: HOSPITAL | Age: 69
End: 2019-12-17
Payer: MEDICARE

## 2019-12-17 ENCOUNTER — HOSPITAL ENCOUNTER (OUTPATIENT)
Facility: HOSPITAL | Age: 69
Discharge: HOME | End: 2019-12-17
Attending: INTERNAL MEDICINE | Admitting: INTERNAL MEDICINE
Payer: MEDICARE

## 2019-12-17 ENCOUNTER — ANESTHESIA EVENT (OUTPATIENT)
Dept: ENDOSCOPY | Facility: HOSPITAL | Age: 69
End: 2019-12-17
Payer: MEDICARE

## 2019-12-17 VITALS
WEIGHT: 180.56 LBS | OXYGEN SATURATION: 98 % | SYSTOLIC BLOOD PRESSURE: 113 MMHG | DIASTOLIC BLOOD PRESSURE: 74 MMHG | BODY MASS INDEX: 25.28 KG/M2 | HEIGHT: 71 IN | RESPIRATION RATE: 18 BRPM | HEART RATE: 84 BPM | TEMPERATURE: 98 F

## 2019-12-17 DIAGNOSIS — Z12.11 COLON CANCER SCREENING: Primary | ICD-10-CM

## 2019-12-17 PROCEDURE — 25000003 PHARM REV CODE 250: Performed by: NURSE ANESTHETIST, CERTIFIED REGISTERED

## 2019-12-17 PROCEDURE — 27201089 HC SNARE, DISP (ANY): Performed by: INTERNAL MEDICINE

## 2019-12-17 PROCEDURE — 45385 PR COLONOSCOPY,REMV LESN,SNARE: ICD-10-PCS | Mod: PT,,, | Performed by: INTERNAL MEDICINE

## 2019-12-17 PROCEDURE — 45385 COLONOSCOPY W/LESION REMOVAL: CPT | Performed by: INTERNAL MEDICINE

## 2019-12-17 PROCEDURE — 88305 TISSUE EXAM BY PATHOLOGIST: CPT | Mod: 26,,, | Performed by: PATHOLOGY

## 2019-12-17 PROCEDURE — 63600175 PHARM REV CODE 636 W HCPCS: Performed by: NURSE ANESTHETIST, CERTIFIED REGISTERED

## 2019-12-17 PROCEDURE — 88305 TISSUE EXAM BY PATHOLOGIST: CPT | Mod: 59 | Performed by: PATHOLOGY

## 2019-12-17 PROCEDURE — 37000008 HC ANESTHESIA 1ST 15 MINUTES: Performed by: INTERNAL MEDICINE

## 2019-12-17 PROCEDURE — 88305 TISSUE EXAM BY PATHOLOGIST: ICD-10-PCS | Mod: 26,,, | Performed by: PATHOLOGY

## 2019-12-17 PROCEDURE — 45385 COLONOSCOPY W/LESION REMOVAL: CPT | Mod: PT,,, | Performed by: INTERNAL MEDICINE

## 2019-12-17 PROCEDURE — 37000009 HC ANESTHESIA EA ADD 15 MINS: Performed by: INTERNAL MEDICINE

## 2019-12-17 PROCEDURE — 63600175 PHARM REV CODE 636 W HCPCS: Performed by: INTERNAL MEDICINE

## 2019-12-17 RX ORDER — LIDOCAINE HYDROCHLORIDE 10 MG/ML
INJECTION, SOLUTION EPIDURAL; INFILTRATION; INTRACAUDAL; PERINEURAL
Status: DISCONTINUED | OUTPATIENT
Start: 2019-12-17 | End: 2019-12-17

## 2019-12-17 RX ORDER — SODIUM CHLORIDE, SODIUM LACTATE, POTASSIUM CHLORIDE, CALCIUM CHLORIDE 600; 310; 30; 20 MG/100ML; MG/100ML; MG/100ML; MG/100ML
INJECTION, SOLUTION INTRAVENOUS CONTINUOUS
Status: DISCONTINUED | OUTPATIENT
Start: 2019-12-17 | End: 2019-12-17 | Stop reason: HOSPADM

## 2019-12-17 RX ORDER — PROPOFOL 10 MG/ML
VIAL (ML) INTRAVENOUS
Status: DISCONTINUED | OUTPATIENT
Start: 2019-12-17 | End: 2019-12-17

## 2019-12-17 RX ADMIN — PROPOFOL 20 MG: 10 INJECTION, EMULSION INTRAVENOUS at 08:12

## 2019-12-17 RX ADMIN — LIDOCAINE HYDROCHLORIDE 50 MG: 10 INJECTION, SOLUTION EPIDURAL; INFILTRATION; INTRACAUDAL; PERINEURAL at 08:12

## 2019-12-17 RX ADMIN — SODIUM CHLORIDE, SODIUM LACTATE, POTASSIUM CHLORIDE, AND CALCIUM CHLORIDE: 600; 310; 30; 20 INJECTION, SOLUTION INTRAVENOUS at 07:12

## 2019-12-17 RX ADMIN — PROPOFOL 100 MG: 10 INJECTION, EMULSION INTRAVENOUS at 08:12

## 2019-12-17 NOTE — H&P
PRE PROCEDURE H&P    Patient Name: Luigi Murillo  MRN: [id number]  : 1950  Date of Procedure:  2019  Referring Physician: Iveth Stafford MD  Primary Physician: Iveth Stafford MD  Procedure Physician: Diann Negrete MD       Planned Procedure: Colonoscopy  Diagnosis: screening for colon cancer  Chief Complaint: Same as above    HPI: Patient is an 69 y.o. male is here for the above.     Last colonoscopy: no prior   Family history: none   Anticoagulation: none     Past Medical History:   Past Medical History:   Diagnosis Date    BPH (benign prostatic hypertrophy) with urinary obstruction     CAD (coronary artery disease) May and Yarely 2011    x2    Chronic pain     DDD (degenerative disc disease), lumbar     Depression, major, in remission     Hyperlipidemia LDL goal < 100     Hypertension     MRSA cellulitis     Osteoarthritis         Past Surgical History:  Past Surgical History:   Procedure Laterality Date    CORONARY ANGIOPLASTY      2 Stents placed a few year ago    Epidural steroid injection      Pain management    INJURY      KNEE SURGERY      Right, x2    MANDIBLE FRACTURE SURGERY      accident during  service    REPAIR OF LIGAMENT OF SHOULDER Right     ROTATOR CUFF REPAIR      right times 2    VASECTOMY          Home Medications:  Prior to Admission medications    Medication Sig Start Date End Date Taking? Authorizing Provider   aspirin (ECOTRIN) 81 MG EC tablet Take 81 mg by mouth once daily.  11  Yes Historical Provider, MD   atorvastatin (LIPITOR) 40 MG tablet Take 1 tablet (40 mg total) by mouth once daily. 11/15/19  Yes Iveth Stafford MD   buPROPion (WELLBUTRIN XL) 300 MG 24 hr tablet Take 1 tablet (300 mg total) by mouth once daily. 11/15/19  Yes Iveth Stafford MD   cyclobenzaprine (FLEXERIL) 10 MG tablet TAKE 1 TABLET NIGHTLY AS NEEDED FOR MUSCLE SPASMS 19  Yes CRISTIANE Banda   HYDROcodone-acetaminophen (NORCO)  mg per tablet Take  1 tablet by mouth daily as needed. 12/21/19 1/20/20 Yes Adi Giron MD   lisinopril (PRINIVIL,ZESTRIL) 5 MG tablet Take 1 tablet (5 mg total) by mouth once daily. 11/15/19  Yes Iveth Stafford MD   metoprolol succinate (TOPROL-XL) 25 MG 24 hr tablet Take 0.5 tablets (12.5 mg total) by mouth once daily. 11/15/19  Yes Iveth Stafford MD   morphine (MS CONTIN) 30 MG 12 hr tablet Take 1 tablet (30 mg total) by mouth 2 (two) times daily. 12/24/19 1/23/20 Yes Adi Giron MD   ranolazine (RANEXA) 500 MG Tb12 Take 1 tablet (500 mg total) by mouth nightly. 11/15/19  Yes Iveth Stafford MD   tamsulosin (FLOMAX) 0.4 mg Cap Take 2 capsules (0.8 mg total) by mouth every evening. 11/15/19  Yes Iveth Stafford MD   HYDROcodone-acetaminophen (NORCO)  mg per tablet Take 1 tablet by mouth daily as needed. 1/20/20 2/19/20  Adi Giron MD   HYDROcodone-acetaminophen (NORCO)  mg per tablet Take 1 tablet by mouth daily as needed. 2/19/20 3/20/20  Adi Giron MD   morphine (MS CONTIN) 30 MG 12 hr tablet Take 1 tablet (30 mg total) by mouth 2 (two) times daily. 1/23/20 2/22/20  Adi Giron MD   morphine (MS CONTIN) 30 MG 12 hr tablet Take 1 tablet (30 mg total) by mouth 2 (two) times daily. 2/22/20 3/23/20  Adi Giron MD        Allergies:  Review of patient's allergies indicates:   Allergen Reactions    Acetaminophen-codeine Rash     #3    Codeine Nausea Only        Social History:   Social History     Socioeconomic History    Marital status:      Spouse name: Not on file    Number of children: Not on file    Years of education: Not on file    Highest education level: Not on file   Occupational History    Not on file   Social Needs    Financial resource strain: Not on file    Food insecurity:     Worry: Not on file     Inability: Not on file    Transportation needs:     Medical: Not on file     Non-medical: Not on file   Tobacco Use    Smoking status: Former  "Smoker     Packs/day: 1.00     Years: 25.00     Pack years: 25.00     Last attempt to quit: 2007     Years since quittin.6    Smokeless tobacco: Never Used   Substance and Sexual Activity    Alcohol use: No    Drug use: Never    Sexual activity: Not on file   Lifestyle    Physical activity:     Days per week: Not on file     Minutes per session: Not on file    Stress: Not on file   Relationships    Social connections:     Talks on phone: Not on file     Gets together: Not on file     Attends Buddhism service: Not on file     Active member of club or organization: Not on file     Attends meetings of clubs or organizations: Not on file     Relationship status: Not on file   Other Topics Concern    Not on file   Social History Narrative    Not on file       Family History:  Family History   Problem Relation Age of Onset    Emphysema Mother     Cancer Mother         unknown    Heart failure Mother     Prostate cancer Brother     Diabetes Paternal Aunt        ROS: No acute cardiac events, no acute respiratory complaints.     Physical Exam (all patients):    /64 (BP Location: Left arm, Patient Position: Lying)   Pulse 81   Temp 97.9 °F (36.6 °C) (Skin)   Resp 18   Ht 5' 11" (1.803 m)   Wt 81.9 kg (180 lb 8.9 oz)   SpO2 97%   BMI 25.18 kg/m²   Lungs: Clear to auscultation bilaterally, respirations unlabored  Heart: Regular rate and rhythm, S1 and S2 normal, no obvious murmurs  Abdomen:         Soft, non-tender, bowel sounds normal, no masses, no organomegaly    Lab Results   Component Value Date    WBC 5.87 2019    MCV 97 2019    RDW 12.4 2019     2019    INR 1.0 06/10/2011    GLU 92 10/02/2018    BUN 17 10/02/2018     10/02/2018    K 4.7 10/02/2018     10/02/2018        SEDATION PLAN: per anesthesia      History reviewed, vital signs satisfactory, cardiopulmonary status satisfactory, sedation options, risks and plans have been discussed with " the patient  All their questions were answered and the patient agrees to the sedation procedures as planned and the patient is deemed an appropriate candidate for the sedation as planned.    Procedure explained to patient, informed consent obtained and placed in chart.    Diann Negrete  12/17/2019  7:58 AM

## 2019-12-17 NOTE — ANESTHESIA PREPROCEDURE EVALUATION
12/17/2019  Luigi Murillo is a 69 y.o., male.    Anesthesia Evaluation    I have reviewed the Patient Summary Reports.    I have reviewed the Nursing Notes.   I have reviewed the Medications.     Review of Systems  Anesthesia Hx:  No problems with previous Anesthesia  History of prior surgery of interest to airway management or planning: jaw. Denies Family Hx of Anesthesia complications.   Denies Personal Hx of Anesthesia complications.   Hematology/Oncology:  Hematology Normal   Oncology Normal     EENT/Dental:EENT/Dental Normal   Cardiovascular:   Hypertension CAD   Angina    Pulmonary:  Pulmonary Normal    Renal/:  Renal/ Normal     Hepatic/GI:  Hepatic/GI Normal    Musculoskeletal:   Arthritis     Neurological:   Headaches    Endocrine:  Endocrine Normal    Dermatological:  Skin Normal    Psych:   Psychiatric History depression          Physical Exam  General:  Well nourished     Eyes/Ears/Nose:  EYES/EARS/NOSE FINDINGS: Normal    Chest/Lungs:  Chest/Lungs Clear    Heart/Vascular:  Heart Findings: Normal Heart murmur: negative Vascular Findings: Normal    Abdomen:  Abdomen Findings: Normal    Musculoskeletal:  Musculoskeletal Findings: Normal   Skin:  Skin Findings: Normal    Mental Status:  Mental Status Findings: Normal        Anesthesia Plan  Type of Anesthesia, risks & benefits discussed:  Anesthesia Type:  MAC  Patient's Preference:   Intra-op Monitoring Plan: standard ASA monitors  Intra-op Monitoring Plan Comments:   Post Op Pain Control Plan: multimodal analgesia  Post Op Pain Control Plan Comments:   Induction:   IV  Beta Blocker:  Patient is on a Beta-Blocker and has received one dose within the past 24 hours (No further documentation required).       Informed Consent: Patient understands risks and agrees with Anesthesia plan.  Questions answered. Anesthesia consent signed with  Actions Requested: Dr Renata Askew, Justus John, patient of Dr Subha Wilhelm will see you Wed am for ER f/u recent onset of fatigue, SOB, chest pain, difficulty with deep respiration without fever, wheezing, dizziness, cough.  Patient was in ER 24 hours ago with negative ch patient.  ASA Score: 3     Day of Surgery Review of History & Physical: I have interviewed and examined the patient. I have reviewed the patient's H&P dated:            Ready For Surgery From Anesthesia Perspective.

## 2019-12-17 NOTE — ANESTHESIA POSTPROCEDURE EVALUATION
Anesthesia Post Evaluation    Patient: Luigi Murillo    Procedure(s) Performed: Procedure(s) (LRB):  COLONOSCOPY (N/A)    Final Anesthesia Type: MAC    Patient location during evaluation: PACU  Patient participation: Yes- Able to Participate  Level of consciousness: awake and alert and oriented  Post-procedure vital signs: reviewed and stable  Pain management: adequate  Airway patency: patent  BETHEL mitigation strategies: Multimodal analgesia  PONV status at discharge: No PONV  Anesthetic complications: no      Cardiovascular status: stable  Respiratory status: unassisted, spontaneous ventilation and room air  Hydration status: euvolemic  Follow-up not needed.          Vitals Value Taken Time   /64 12/17/2019  7:38 AM   Temp 36.6 °C (97.9 °F) 12/17/2019  7:38 AM   Pulse 81 12/17/2019  7:38 AM   Resp 18 12/17/2019  7:38 AM   SpO2 97 % 12/17/2019  7:38 AM         No case tracking events are documented in the log.      Pain/Surya Score: No data recorded

## 2019-12-17 NOTE — DISCHARGE SUMMARY
Endoscopy Discharge Summary      Admit Date: 12/17/2019    Discharge Date and Time:  12/17/2019 8:25 AM    Attending Physician: Diann Negrete MD     Discharge Physician: Diann Negrete MD     Principal Admitting Diagnoses: Colon cancer screening         Discharge Diagnosis: The encounter diagnosis was Colon cancer screening.     Discharged Condition: Good    Indication for Admission: Colon cancer screening     Hospital Course: Patient was admitted for an inpatient procedure and tolerated the procedure well with no complications.    Significant Diagnostic Studies: Colonoscopy with polypectomy    Pathology (if any):  Specimen (12h ago, onward)    None          Estimated Blood Loss: 1 ml.    Discussed with: patient.    Disposition: Home.    Follow Up/Patient Instructions:   Current Discharge Medication List      CONTINUE these medications which have NOT CHANGED    Details   aspirin (ECOTRIN) 81 MG EC tablet Take 81 mg by mouth once daily.       atorvastatin (LIPITOR) 40 MG tablet Take 1 tablet (40 mg total) by mouth once daily.  Qty: 90 tablet, Refills: 1    Associated Diagnoses: CAD in native artery      buPROPion (WELLBUTRIN XL) 300 MG 24 hr tablet Take 1 tablet (300 mg total) by mouth once daily.  Qty: 90 tablet, Refills: 1    Associated Diagnoses: Depression, major, in remission      cyclobenzaprine (FLEXERIL) 10 MG tablet TAKE 1 TABLET NIGHTLY AS NEEDED FOR MUSCLE SPASMS  Qty: 90 tablet, Refills: 3      !! HYDROcodone-acetaminophen (NORCO)  mg per tablet Take 1 tablet by mouth daily as needed.  Qty: 30 tablet, Refills: 0    Comments: Medically necessary for more than 7 days, ICD 10: G89.4      lisinopril (PRINIVIL,ZESTRIL) 5 MG tablet Take 1 tablet (5 mg total) by mouth once daily.  Qty: 90 tablet, Refills: 3    Associated Diagnoses: CAD in native artery      metoprolol succinate (TOPROL-XL) 25 MG 24 hr tablet Take 0.5 tablets (12.5 mg total) by mouth once daily.  Qty: 45 tablet, Refills: 1     Associated Diagnoses: CAD in native artery      !! morphine (MS CONTIN) 30 MG 12 hr tablet Take 1 tablet (30 mg total) by mouth 2 (two) times daily.  Qty: 60 tablet, Refills: 0    Comments: Medically necessary for more than 7 days, ICD 10: G89.4      ranolazine (RANEXA) 500 MG Tb12 Take 1 tablet (500 mg total) by mouth nightly.  Qty: 90 tablet, Refills: 1    Associated Diagnoses: CAD in native artery      tamsulosin (FLOMAX) 0.4 mg Cap Take 2 capsules (0.8 mg total) by mouth every evening.  Qty: 180 capsule, Refills: 1    Associated Diagnoses: Benign prostatic hyperplasia (BPH) with straining on urination      !! HYDROcodone-acetaminophen (NORCO)  mg per tablet Take 1 tablet by mouth daily as needed.  Qty: 30 tablet, Refills: 0    Comments: Medically necessary for more than 7 days, ICD 10: G89.4      !! HYDROcodone-acetaminophen (NORCO)  mg per tablet Take 1 tablet by mouth daily as needed.  Qty: 30 tablet, Refills: 0    Comments: Medically necessary for more than 7 days, ICD 10: G89.4      !! morphine (MS CONTIN) 30 MG 12 hr tablet Take 1 tablet (30 mg total) by mouth 2 (two) times daily.  Qty: 60 tablet, Refills: 0    Comments: Medically necessary for more than 7 days, ICD 10: G89.4      !! morphine (MS CONTIN) 30 MG 12 hr tablet Take 1 tablet (30 mg total) by mouth 2 (two) times daily.  Qty: 60 tablet, Refills: 0    Comments: Medically necessary for more than 7 days, ICD 10: G89.4       !! - Potential duplicate medications found. Please discuss with provider.          Discharge Procedure Orders   Diet general     Call MD for:  temperature >100.4     Call MD for:  persistent nausea and vomiting     Call MD for:  severe uncontrolled pain     Call MD for:  difficulty breathing, headache or visual disturbances     Activity as tolerated       Follow-up Information     Diann Negrete MD. Call in 1 week.    Specialty:  Gastroenterology  Why:  For pathology results  Contact information:  [id number]  [place] [address]  [phone]

## 2019-12-17 NOTE — [employer] PATIENT INSTRUCTIONS
Discharge Summary/Instructions after an Endoscopic Procedure  Patient Name: Luigi Murillo  Patient MRN: [id number]  Patient YOB: 1950 Tuesday, December 17, 2019 Diann Negrete MD  RESTRICTIONS:  During your procedure today, you received medications for sedation.  These   medications may affect your judgment, balance and coordination.  Therefore,   for 24 hours, you have the following restrictions:   - DO NOT drive a car, operate machinery, make legal/financial decisions,   sign important papers or drink alcohol.    ACTIVITY:  Today: no heavy lifting, straining or running due to procedural   sedation/anesthesia.  The following day: return to full activity including work.  DIET:  Eat and drink normally unless instructed otherwise.     TREATMENT FOR COMMON SIDE EFFECTS:  - Mild abdominal pain, nausea, belching, bloating or excessive gas:  rest,   eat lightly and use a heating pad.  - Sore Throat: treat with throat lozenges and/or gargle with warm salt   water.  - Because air was used during the procedure, expelling large amounts of air   from your rectum or belching is normal.  - If a bowel prep was taken, you may not have a bowel movement for 1-3 days.    This is normal.  SYMPTOMS TO WATCH FOR AND REPORT TO YOUR PHYSICIAN:  1. Abdominal pain or bloating, other than gas cramps.  2. Chest pain.  3. Back pain.  4. Signs of infection such as: chills or fever occurring within 24 hours   after the procedure.  5. Rectal bleeding, which would show as bright red, maroon, or black stools.   (A tablespoon of blood from the rectum is not serious, especially if   hemorrhoids are present.)  6. Vomiting.  7. Weakness or dizziness.  GO DIRECTLY TO THE NEAREST EMERGENCY ROOM IF YOU HAVE ANY OF THE FOLLOWING:      Difficulty breathing              Chills and/or fever over 101 F   Persistent vomiting and/or vomiting blood   Severe abdominal pain   Severe chest pain   Black, tarry stools   Bleeding- more than one  tablespoon   Any other symptom or condition that you feel may need urgent attention  Your doctor recommends these additional instructions:  If any biopsies were taken, your doctors clinic will contact you in 1 to 2   weeks with any results.  - Patient has a contact number available for emergencies.  The signs and   symptoms of potential delayed complications were discussed with the   patient.  Return to normal activities tomorrow.  Written discharge   instructions were provided to the patient.   - Discharge patient to home (via wheelchair).   - Resume previous diet today.   - Continue present medications.   - No aspirin, ibuprofen, naproxen, or other non-steroidal anti-inflammatory   drugs for 7 days after polyp removal.   - Await pathology results.   - Repeat colonoscopy in 3 years for surveillance.  For questions, problems or results please call your physician Diann Negrete MD at Work:  [phone]  If you have any questions about the above instructions, call the GI   department at [phone] or call the endoscopy unit at [phone]   from 7am until 3 pm.  [facility] [place], EMERGENCY ROOM PHONE NUMBER:   [phone]  IF A COMPLICATION OR EMERGENCY SITUATION ARISES AND YOU ARE UNABLE TO REACH   YOUR PHYSICIAN - GO DIRECTLY TO THE EMERGENCY ROOM.  I have read or have had read to me these discharge instructions for my   procedure and have received a written copy.  I understand these   instructions and will follow-up with my physician if I have any questions.     __________________________________       _____________________________________  Nurse Signature                                          Patient/Designated   Responsible Party Signature  MD Diann Calderon MD  12/17/2019 8:24:23 AM  This report has been verified and signed electronically.  [employer]

## 2019-12-17 NOTE — TRANSFER OF CARE
"Anesthesia Transfer of Care Note    Patient: Luigi Murillo    Procedure(s) Performed: Procedure(s) (LRB):  COLONOSCOPY (N/A)    Patient location: PACU    Anesthesia Type: MAC    Transport from OR: Transported from OR on room air with adequate spontaneous ventilation    Post pain: adequate analgesia    Post assessment: tolerated procedure well and no apparent anesthetic complications    Post vital signs: stable    Level of consciousness: awake, alert and oriented    Nausea/Vomiting: no nausea/vomiting    Complications: none    Transfer of care protocol was followed      Last vitals:   Visit Vitals  /64 (BP Location: Left arm, Patient Position: Lying)   Pulse 81   Temp 36.6 °C (97.9 °F) (Skin)   Resp 18   Ht 5' 11" (1.803 m)   Wt 81.9 kg (180 lb 8.9 oz)   SpO2 97%   BMI 25.18 kg/m²     "

## 2019-12-17 NOTE — ANESTHESIA RELEASE NOTE
"Anesthesia Release from PACU Note    Patient: Luigi Murillo    Procedure(s) Performed: Procedure(s) (LRB):  COLONOSCOPY (N/A)    Anesthesia type: MAC    Post pain: Adequate analgesia    Post assessment: no apparent anesthetic complications and tolerated procedure well    Last Vitals:   Visit Vitals  /64 (BP Location: Left arm, Patient Position: Lying)   Pulse 81   Temp 36.6 °C (97.9 °F) (Skin)   Resp 18   Ht 5' 11" (1.803 m)   Wt 81.9 kg (180 lb 8.9 oz)   SpO2 97%   BMI 25.18 kg/m²       Post vital signs: stable    Level of consciousness: awake, alert  and oriented    Nausea/Vomiting: no nausea/no vomiting    Complications: none    Airway Patency: patent    Respiratory: unassisted, spontaneous ventilation, room air    Cardiovascular: stable and blood pressure at baseline    Hydration: euvolemic  "

## 2019-12-17 NOTE — DISCHARGE INSTRUCTIONS
Diverticulosis    Diverticulosis means that small pouches have formed in the wall of your large intestine (colon). Most often, this problem causes no symptoms and is common as people age. But the pouches in the colon are at risk of becoming infected. When this happens, the condition is called diverticulitis. Although most people with diverticulosis never develop diverticulitis, it is still not uncommon. Rectal bleeding can also occur and in less common situations, a type of colon inflammation called colitis.  While most people do not have symptoms, some people with diverticulosis may have:  · Abdominal cramps and pain  · Bloating  · Constipation  · Change in bowel habits  Causes  The exact cause of diverticulosis (and diverticulitis) has not been proved, but a few things are associated with the condition:  · Low-fiber diet  · Constipation  · Lack of exercise  Your healthcare provider will talk with you about how to manage your condition. Diet changes may be all that are needed to help control diverticulosis and prevent progression to diverticulitis. If you develop diverticulitis, you will likely need other treatments.  Home care  You may be told to take fiber supplements daily. Fiber adds bulk to the stool so that it passes through the colon more easily. Stool softeners may be recommended. You may also be given medications for pain relief. Be sure to take all medications as directed.  In the past, people were told to avoid corn, nuts, and seeds. This is no longer necessary.  Follow these guidelines when caring for yourself at home:  · Eat unprocessed foods that are high in fiber. Whole grains, fruits, and vegetables are good choices.  · Drink 6 to 8 glasses of water every day unless your healthcare provider has you limit how much fluid you should have.  · Watch for changes in your bowel movements. Tell your provider if you notice any changes.  · Begin an exercise program. Ask your provider how to get started.  Generally, walking is the best.  · Get plenty of rest and sleep.  Follow-up care  Follow up with your healthcare provider, or as advised. Regular visits may be needed to check on your health. Sometimes special procedures such as colonoscopy, are needed after an episode of diverticulitis or blooding. Be sure to keep all your appointments.  If a stool sample was taken, or cultures were done, you should be told if they are positive, or if your treatment needs to be changed. You can call as directed for the results.  If X-rays were done, a radiologist will look at them. You will be told if there is a change in your treatment.  If antibiotics were prescribed, be sure to finish them all.  When to seek medical advice  Call your healthcare provider right away if any of these occur:  · Fever of 100.4°F (38°C) or higher, or as directed by your healthcare provider  · Severe cramps in the lower left side of the abdomen or pain that is getting worse  · Tenderness in the lower left side of the abdomen or worsening pain throughout the abdomen  · Diarrhea or constipation that doesn't get better within 24 hours  · Nausea and vomiting  · Bleeding from the rectum  Call 911  Call emergency services if any of the following occur:  · Trouble breathing  · Confusion  · Very drowsy or trouble awakening  · Fainting or loss of consciousness  · Rapid heart rate  · Chest pain  Date Last Reviewed: 12/30/2015 © 2000-2017 [employer]. [address] [place] [address]. All rights reserved. This information is not intended as a substitute for professional medical care. Always follow your healthcare professional's instructions.        Understanding Colon and Rectal Polyps    The colon (also called the large intestine) is a muscular tube that forms the last part of the digestive tract. It absorbs water and stores food waste. The colon is about 4 to 6 feet long. The rectum is the last 6 inches of the colon. The colon and rectum  have a smooth lining composed of millions of cells. Changes in these cells can lead to growths in the colon that can become cancerous and should be removed. Multiple tests are available to screen for colon cancer, but the colonoscopy is the most recommended test. During colonoscopy, these polyps can be removed. How often you need this test depends on many things including your condition, your family history, symptoms, and what the findings were at the previous colonoscopy.   When the colon lining changes  Changes that happen in the cells that line the colon or rectum can lead to growths called polyps. Over a period of years, polyps can turn cancerous. Removing polyps early may prevent cancer from ever forming.  Polyps  Polyps are fleshy clumps of tissue that form on the lining of the colon or rectum. Small polyps are usually benign (not cancerous). However, over time, cells in a polyp can change and become cancerous. Certain types of polyps known as adenomatous polyps are premalignant. The risk for invasive cancer increases with the size of the polyp and certain cell and gene features. This means that they can become cancerous if they're not removed. Hyperplastic polyps are benign. They can grow quite large and not turn cancerous.   Cancer  Almost all colorectal cancers start when polyp cells begin growing abnormally. As a cancerous tumor grows, it may involve more and more of the colon or rectum. In time, cancer can also grow beyond the colon or rectum and spread to nearby organs or to glands called lymph nodes. The cells can also travel to other parts of the body. This is known as metastasis. The earlier a cancerous tumor is removed, the better the chance of preventing its spread.    Date Last Reviewed: 8/1/2016  © 7265-8092 [employer]. [address] [place] [address], [place] [address]. All rights reserved. This information is not intended as a substitute for professional medical care. Always follow your  healthcare professional's instructions.

## 2019-12-31 ENCOUNTER — TELEPHONE (OUTPATIENT)
Dept: CARDIOLOGY | Facility: CLINIC | Age: 69
End: 2019-12-31

## 2019-12-31 NOTE — TELEPHONE ENCOUNTER
Patient needs cardiac clearance for for Right Robotic Total Knee Replacement under general and permission to hold aspirin 81 mg @LDA(10)@. Hung please fax to [phone].    Last seen 11/19

## 2020-01-02 ENCOUNTER — PATIENT MESSAGE (OUTPATIENT)
Dept: FAMILY MEDICINE | Facility: CLINIC | Age: 70
End: 2020-01-02

## 2020-01-02 LAB
FINAL PATHOLOGIC DIAGNOSIS: NORMAL
GROSS: NORMAL

## 2020-01-02 NOTE — TELEPHONE ENCOUNTER
Tubular adenoma on colonoscopy. Need to repeat in 3 years.    Results explained to patient. All questions answered.

## 2020-02-17 PROBLEM — Z00.00 ENCOUNTER FOR ANNUAL HEALTH EXAMINATION: Status: RESOLVED | Noted: 2019-11-15 | Resolved: 2020-02-17

## 2020-03-06 ENCOUNTER — PATIENT MESSAGE (OUTPATIENT)
Dept: PAIN MEDICINE | Facility: CLINIC | Age: 70
End: 2020-03-06

## 2020-03-09 ENCOUNTER — PATIENT MESSAGE (OUTPATIENT)
Dept: PAIN MEDICINE | Facility: CLINIC | Age: 70
End: 2020-03-09

## 2020-03-11 ENCOUNTER — OFFICE VISIT (OUTPATIENT)
Dept: PAIN MEDICINE | Facility: CLINIC | Age: 70
End: 2020-03-11
Payer: MEDICARE

## 2020-03-11 VITALS
OXYGEN SATURATION: 96 % | SYSTOLIC BLOOD PRESSURE: 136 MMHG | TEMPERATURE: 98 F | DIASTOLIC BLOOD PRESSURE: 81 MMHG | WEIGHT: 187.94 LBS | BODY MASS INDEX: 26.21 KG/M2 | RESPIRATION RATE: 18 BRPM | HEART RATE: 92 BPM

## 2020-03-11 DIAGNOSIS — G89.29 CHRONIC PAIN OF RIGHT KNEE: Primary | ICD-10-CM

## 2020-03-11 DIAGNOSIS — M25.561 CHRONIC PAIN OF RIGHT KNEE: Primary | ICD-10-CM

## 2020-03-11 DIAGNOSIS — Z79.891 OPIOID CONTRACT EXISTS: ICD-10-CM

## 2020-03-11 PROCEDURE — 99214 OFFICE O/P EST MOD 30 MIN: CPT | Mod: PBBFAC,PN | Performed by: ANESTHESIOLOGY

## 2020-03-11 PROCEDURE — 99999 PR PBB SHADOW E&M-EST. PATIENT-LVL IV: ICD-10-PCS | Mod: PBBFAC,,, | Performed by: ANESTHESIOLOGY

## 2020-03-11 PROCEDURE — 99213 PR OFFICE/OUTPT VISIT, EST, LEVL III, 20-29 MIN: ICD-10-PCS | Mod: S$PBB,,, | Performed by: ANESTHESIOLOGY

## 2020-03-11 PROCEDURE — 99999 PR PBB SHADOW E&M-EST. PATIENT-LVL IV: CPT | Mod: PBBFAC,,, | Performed by: ANESTHESIOLOGY

## 2020-03-11 PROCEDURE — 99213 OFFICE O/P EST LOW 20 MIN: CPT | Mod: S$PBB,,, | Performed by: ANESTHESIOLOGY

## 2020-03-11 RX ORDER — MORPHINE SULFATE 30 MG/1
30 TABLET, FILM COATED, EXTENDED RELEASE ORAL 2 TIMES DAILY
Qty: 60 TABLET | Refills: 0 | Status: SHIPPED | OUTPATIENT
Start: 2020-05-22 | End: 2020-06-11 | Stop reason: SDUPTHER

## 2020-03-11 RX ORDER — HYDROCODONE BITARTRATE AND ACETAMINOPHEN 10; 325 MG/1; MG/1
1 TABLET ORAL DAILY PRN
Qty: 30 TABLET | Refills: 0 | Status: SHIPPED | OUTPATIENT
Start: 2020-04-19 | End: 2020-05-19

## 2020-03-11 RX ORDER — HYDROCODONE BITARTRATE AND ACETAMINOPHEN 10; 325 MG/1; MG/1
1 TABLET ORAL DAILY PRN
Qty: 30 TABLET | Refills: 0 | Status: SHIPPED | OUTPATIENT
Start: 2020-05-19 | End: 2020-06-11 | Stop reason: SDUPTHER

## 2020-03-11 RX ORDER — MORPHINE SULFATE 30 MG/1
30 TABLET, FILM COATED, EXTENDED RELEASE ORAL 2 TIMES DAILY
Qty: 60 TABLET | Refills: 0 | Status: SHIPPED | OUTPATIENT
Start: 2020-03-23 | End: 2020-04-22

## 2020-03-11 RX ORDER — HYDROCODONE BITARTRATE AND ACETAMINOPHEN 10; 325 MG/1; MG/1
1 TABLET ORAL DAILY PRN
Qty: 30 TABLET | Refills: 0 | Status: SHIPPED | OUTPATIENT
Start: 2020-03-20 | End: 2020-04-19

## 2020-03-11 RX ORDER — MORPHINE SULFATE 30 MG/1
30 TABLET, FILM COATED, EXTENDED RELEASE ORAL 2 TIMES DAILY
Qty: 60 TABLET | Refills: 0 | Status: SHIPPED | OUTPATIENT
Start: 2020-04-22 | End: 2020-05-22

## 2020-03-11 NOTE — PROGRESS NOTES
This note was completed with dictation software and grammatical errors may exist.    CC:Knee pain, shoulder pain    HPI: The patient is a 69-year-old man with history of CAD and stent, hypertension, degenerative joint disease in the right shoulder and knee who presents in referral from Dr. Traore for help with pain.  He returns in follow-up, continues to complain of right knee pain.  He was scheduled for right knee replacement but his wife has been particularly ill with difficult to control insulin.  He states that she was actually hospitalized for hypoglycemia and he was worried that if he were in the hospital or in recovery, she may have  if he would not have been there.  She is getting this manage right now, they have had try several different insulin pumps and hopes that things will be more stable in several months.  He plans on going through with his knee replacement this summer.  For now, he continues to use morphine 30 milligrams twice daily and hydrocodone once daily.  He feels that this manage the pain sufficiently.  He reports wanting to discontinue this opioid medication once he has had the knee replacement.    Pain intervention history:  He is status post right L3 and L4 transforaminal epidural steroid injections on 3/14/14 with 85% relief at first, now reporting what sounds like about 25% relief.  He is status post right L3 and L4 transforaminal epidural steroid injection on 14 with 0% relief.  He is status post right knee geniculate nerve block on 16 with 0% relief so radiofrequency ablation was not scheduled.    Urine drug screen on 14 was positive for buprenorphine and tramadol but negative for hydrocodone which he was apparently taking.    Repeat drug screen on 14 again positive for buprenorphine and tramadol but negative for hydrocodone.  14 and 14 urine drug screens both consistent.    ROS:He reports fatigability, chest pain at times, easy bruising, joint  stiffness, joint swelling and back pain.  Balance of review of systems negative.    Medical, surgical, family and social history reviewed elsewhere in record.    Medications/Allergies: See med card    Vitals:    03/11/20 1044   BP: 136/81   Pulse: 92   Resp: 18   Temp: 98 °F (36.7 °C)   TempSrc: Oral   SpO2: 96%   Weight: 85.2 kg (187 lb 15.1 oz)   PainSc:   6   PainLoc: Knee         Physical exam:  Gen: A and O x3, pleasant, well-groomed  Skin: No rashes or obvious lesions  HEENT: PERRLA  CVS: Regular rate and rhythm, normal S1 and S2, no murmurs.  Resp: Clear to auscultation bilaterally, no wheezes or rales.  Abdomen: Soft, NT/ND, normal bowel sounds present.  Musculoskeletal:  No antalgic gait.      Neuro:  Upper extremities: 5/5 strength bilaterally   Lower extremities: 5/5 strength bilaterally  Reflexes: Brachioradialis 2+, Bicep 2+, Tricep 2+. Patellar 2+, Achilles 2+ bilaterally  Sensory: Intact and symmetrical to light touch and pinprick in C2-T1 dermatomes bilaterally.  Intact and symmetrical to light touch and pinprick in L2-S1 dermatomes bilaterally.    Lumbar spine:  Range of motion is mildly limited with extension and full with flexion with increased pain during each maneuver.  Oblique extension causes mild increased pain on the corresponding side.  Jamshid's test is negative bilaterally.  Straight leg raise is negative bilaterally.  Internal/external rotation of hip is negative bilaterally.  Myofascial exam: Mild tenderness to palpation to the bilateral lumbar paraspinous muscles.    Right knee: Mild crepitus on range of motion with pain on passive flexion and extension, mild tenderness to superior portion of the joint, lateral portion, no redness or erythema or swelling.    Right shoulder: Range of motion is full but painful with flexion, abduction and internal/external rotation.  Empty can test positive.  Moderate generalized tenderness to palpation to the anterior and lateral shoulder.  Mild  tenderness to palpation to the right scapular region.    Imagin11 Xray right knee:   THERE IS SPURRING OF THE SUPERIOR PATELLAR FACET AND NARROWING OF THE PATELLOFEMORAL JOINT SPACE. NO JOINT EFFUSION OR ACUTE OSSEOUS ABNORMALITY IS SEEN.    14 MRI lumbar spine  At the T12-L1 level, no significant disk bulge, central canal stenosis, or neural foraminal stenosis is noted. Mild ligamentous hypertrophy is noted  At the L1-L2 level, no significant disk bulge, central canal stenosis, or neural foraminal stenosis is noted. Mild ligamentous hypertrophy is noted   At the L2-L3 level, minimal disk bulging may be noted one to 2 mm. Facet arthropathy and ligamentous hypertrophy is noted. Minimal bilateral neural foraminal narrowing is noted. The central canal is approximately 11 mm and may be minimally narrowed.  At the L3-L4 level, broad-based disk bulging appears to be present of approximately 3 to 4 mm with an asymmetric component greater towards the right neuroforamen a 4 mm that may relate to protrusion. Facet arthropathy and ligamentous hypertrophy is noted. Mild central canal stenosis is noted to 9 mm. Mild to moderate right neuroforaminal narrowing appears to present with possible contact of the exiting nerve root on the right. Mild left neural foraminal narrowing is noted.  At the L4-L5 level the disk osteophyte protrusion appears to be present paracentric to the right of approximately 5 mm. Mild to moderate central canal narrowing is noted to 8 mm. Mild to moderate right nerve foraminal narrowing is noted with mild left neuroforaminal narrowing. Contact of the exiting right nerve root may be present. Increased T2 signal is noted suggestive of an annular tear paracentric to the right  At the L5-S1 level, ligamentous hypertrophy is noted in. No significant disk bulge, central canal stenosis, or neural foraminal stenosis is noted.      Assessment:  The patient is a 69-year-old man with history of CAD and  stent, hypertension, degenerative joint disease in the right shoulder and knee who presents in referral from Dr. Traore for help with pain.   1. Chronic pain of right knee     2. Opioid contract exists         Plan:  1.  I have refilled his MS Contin and hydrocodone for the next 3 months, I will have him follow up in 3 months or sooner as needed.  He has not shown any signs of abuse or addiction, no other concerns. [place] Board of Pharmacy website checked and no aberrant patterns noted.

## 2020-04-03 DIAGNOSIS — F32.5 DEPRESSION, MAJOR, IN REMISSION: ICD-10-CM

## 2020-04-03 DIAGNOSIS — I25.10 CAD IN NATIVE ARTERY: ICD-10-CM

## 2020-04-06 RX ORDER — RANOLAZINE 500 MG/1
TABLET, EXTENDED RELEASE ORAL
Qty: 90 TABLET | Refills: 1 | Status: SHIPPED | OUTPATIENT
Start: 2020-04-06 | End: 2020-11-16 | Stop reason: SDUPTHER

## 2020-04-06 RX ORDER — BUPROPION HYDROCHLORIDE 300 MG/1
TABLET ORAL
Qty: 90 TABLET | Refills: 1 | Status: SHIPPED | OUTPATIENT
Start: 2020-04-06 | End: 2020-11-16 | Stop reason: SDUPTHER

## 2020-04-15 ENCOUNTER — PATIENT MESSAGE (OUTPATIENT)
Dept: PAIN MEDICINE | Facility: CLINIC | Age: 70
End: 2020-04-15

## 2020-04-16 NOTE — TELEPHONE ENCOUNTER
Please contact his pharmacy and allow him to get the refill on Saturday since they are closed on Sunday

## 2020-05-13 DIAGNOSIS — I25.10 CAD IN NATIVE ARTERY: ICD-10-CM

## 2020-05-13 RX ORDER — ATORVASTATIN CALCIUM 40 MG/1
TABLET, FILM COATED ORAL
Qty: 90 TABLET | Refills: 3 | Status: SHIPPED | OUTPATIENT
Start: 2020-05-13 | End: 2021-05-31

## 2020-05-13 RX ORDER — METOPROLOL SUCCINATE 25 MG/1
TABLET, EXTENDED RELEASE ORAL
Qty: 45 TABLET | Refills: 3 | Status: SHIPPED | OUTPATIENT
Start: 2020-05-13 | End: 2021-05-10

## 2020-06-11 ENCOUNTER — OFFICE VISIT (OUTPATIENT)
Dept: PAIN MEDICINE | Facility: CLINIC | Age: 70
End: 2020-06-11
Payer: MEDICARE

## 2020-06-11 VITALS
BODY MASS INDEX: 26.61 KG/M2 | HEART RATE: 90 BPM | DIASTOLIC BLOOD PRESSURE: 91 MMHG | SYSTOLIC BLOOD PRESSURE: 154 MMHG | TEMPERATURE: 99 F | RESPIRATION RATE: 18 BRPM | WEIGHT: 190.81 LBS

## 2020-06-11 DIAGNOSIS — M25.561 CHRONIC PAIN OF RIGHT KNEE: Primary | ICD-10-CM

## 2020-06-11 DIAGNOSIS — G89.29 CHRONIC PAIN OF RIGHT KNEE: Primary | ICD-10-CM

## 2020-06-11 DIAGNOSIS — Z79.891 OPIOID CONTRACT EXISTS: ICD-10-CM

## 2020-06-11 DIAGNOSIS — M25.562 CHRONIC PAIN OF LEFT KNEE: ICD-10-CM

## 2020-06-11 DIAGNOSIS — G89.29 CHRONIC PAIN OF LEFT KNEE: ICD-10-CM

## 2020-06-11 DIAGNOSIS — G89.4 CHRONIC PAIN SYNDROME: ICD-10-CM

## 2020-06-11 DIAGNOSIS — M51.36 DDD (DEGENERATIVE DISC DISEASE), LUMBAR: ICD-10-CM

## 2020-06-11 DIAGNOSIS — G89.29 CHRONIC RIGHT SHOULDER PAIN: ICD-10-CM

## 2020-06-11 DIAGNOSIS — M25.511 CHRONIC RIGHT SHOULDER PAIN: ICD-10-CM

## 2020-06-11 PROCEDURE — 99999 PR PBB SHADOW E&M-EST. PATIENT-LVL IV: ICD-10-PCS | Mod: PBBFAC,,, | Performed by: PHYSICIAN ASSISTANT

## 2020-06-11 PROCEDURE — 99213 OFFICE O/P EST LOW 20 MIN: CPT | Mod: S$PBB,,, | Performed by: PHYSICIAN ASSISTANT

## 2020-06-11 PROCEDURE — 99999 PR PBB SHADOW E&M-EST. PATIENT-LVL IV: CPT | Mod: PBBFAC,,, | Performed by: PHYSICIAN ASSISTANT

## 2020-06-11 PROCEDURE — 99213 PR OFFICE/OUTPT VISIT, EST, LEVL III, 20-29 MIN: ICD-10-PCS | Mod: S$PBB,,, | Performed by: PHYSICIAN ASSISTANT

## 2020-06-11 PROCEDURE — 99214 OFFICE O/P EST MOD 30 MIN: CPT | Mod: PBBFAC,PN | Performed by: PHYSICIAN ASSISTANT

## 2020-06-11 RX ORDER — HYDROCODONE BITARTRATE AND ACETAMINOPHEN 10; 325 MG/1; MG/1
1 TABLET ORAL DAILY PRN
Qty: 30 TABLET | Refills: 0 | Status: SHIPPED | OUTPATIENT
Start: 2020-08-17 | End: 2020-09-16

## 2020-06-11 RX ORDER — HYDROCODONE BITARTRATE AND ACETAMINOPHEN 10; 325 MG/1; MG/1
1 TABLET ORAL DAILY PRN
Qty: 30 TABLET | Refills: 0 | Status: SHIPPED | OUTPATIENT
Start: 2020-07-18 | End: 2020-08-17

## 2020-06-11 RX ORDER — MORPHINE SULFATE 30 MG/1
30 TABLET, FILM COATED, EXTENDED RELEASE ORAL 2 TIMES DAILY
Qty: 60 TABLET | Refills: 0 | Status: SHIPPED | OUTPATIENT
Start: 2020-08-20 | End: 2020-09-19

## 2020-06-11 RX ORDER — MORPHINE SULFATE 30 MG/1
30 TABLET, FILM COATED, EXTENDED RELEASE ORAL 2 TIMES DAILY
Qty: 60 TABLET | Refills: 0 | Status: SHIPPED | OUTPATIENT
Start: 2020-07-21 | End: 2020-08-20

## 2020-06-11 RX ORDER — MORPHINE SULFATE 30 MG/1
30 TABLET, FILM COATED, EXTENDED RELEASE ORAL 2 TIMES DAILY
Qty: 60 TABLET | Refills: 0 | Status: SHIPPED | OUTPATIENT
Start: 2020-06-21 | End: 2020-07-21

## 2020-06-11 RX ORDER — HYDROCODONE BITARTRATE AND ACETAMINOPHEN 10; 325 MG/1; MG/1
1 TABLET ORAL DAILY PRN
Qty: 30 TABLET | Refills: 0 | Status: SHIPPED | OUTPATIENT
Start: 2020-06-18 | End: 2020-07-18

## 2020-06-15 NOTE — PROGRESS NOTES
This note was completed with dictation software and grammatical errors may exist.    CC:Knee pain, shoulder pain    HPI: The patient is a 69-year-old man with history of CAD and stent, hypertension, degenerative joint disease in the right shoulder and knee who presents in referral from Dr. Traore for help with pain.  He returns in follow-up today with continued shoulder, back and knee pain.  He denies any major changes to his pain and had planned on having a knee replacement.  However, his wife has been ill and he has been numb able to be away from her especially overnight.  He continues to take pain medicine with adequate relief and states that this will be his last visit.  He states he is pleased with the care here but his wife sees a different pain management doctor that is closer to home and he is going to transfer his care there.    Pain intervention history:  He is status post right L3 and L4 transforaminal epidural steroid injections on 3/14/14 with 85% relief at first, now reporting what sounds like about 25% relief.  He is status post right L3 and L4 transforaminal epidural steroid injection on 6/4/14 with 0% relief.  He is status post right knee geniculate nerve block on 11/4/16 with 0% relief so radiofrequency ablation was not scheduled.    Urine drug screen on 1/22/14 was positive for buprenorphine and tramadol but negative for hydrocodone which he was apparently taking.    Repeat drug screen on 2/20/14 again positive for buprenorphine and tramadol but negative for hydrocodone.  5/20/14 and 6/25/14 urine drug screens both consistent.    ROS:He reports fatigability, chest pain at times, easy bruising, joint stiffness, joint swelling and back pain.  Balance of review of systems negative.    Medical, surgical, family and social history reviewed elsewhere in record.    Medications/Allergies: See med card    Vitals:    06/11/20 1042   BP: (!) 154/91   Pulse: 90   Resp: 18   Temp: 99.2 °F (37.3 °C)   Weight:  86.5 kg (190 lb 12.9 oz)   PainSc: 10-Worst pain ever   PainLoc: Knee         Physical exam:  Gen: A and O x3, pleasant, well-groomed  Skin: No rashes or obvious lesions  HEENT: PERRLA  CVS: Regular rate and rhythm, normal S1 and S2, no murmurs.  Resp: Clear to auscultation bilaterally, no wheezes or rales.  Abdomen: Soft, NT/ND, normal bowel sounds present.  Musculoskeletal:  No antalgic gait.      Neuro:  Upper extremities: 5/5 strength bilaterally   Lower extremities: 5/5 strength bilaterally  Reflexes: Brachioradialis 2+, Bicep 2+, Tricep 2+. Patellar 2+, Achilles 2+ bilaterally  Sensory: Intact and symmetrical to light touch and pinprick in C2-T1 dermatomes bilaterally.  Intact and symmetrical to light touch and pinprick in L2-S1 dermatomes bilaterally.    Lumbar spine:  Range of motion is mildly limited with extension and full with flexion with increased pain during each maneuver.  Oblique extension causes mild increased pain on the corresponding side.  Jamshid's test is negative bilaterally.  Straight leg raise is negative bilaterally.  Internal/external rotation of hip is negative bilaterally.  Myofascial exam: Mild tenderness to palpation to the bilateral lumbar paraspinous muscles.    Right knee: Mild crepitus on range of motion with pain on passive flexion and extension, mild tenderness to superior portion of the joint, lateral portion, no redness or erythema or swelling.    Right shoulder: Range of motion is full but painful with flexion, abduction and internal/external rotation.  Empty can test positive.  Moderate generalized tenderness to palpation to the anterior and lateral shoulder.  Mild tenderness to palpation to the right scapular region.    Imagin11 Xray right knee:   THERE IS SPURRING OF THE SUPERIOR PATELLAR FACET AND NARROWING OF THE PATELLOFEMORAL JOINT SPACE. NO JOINT EFFUSION OR ACUTE OSSEOUS ABNORMALITY IS SEEN.    14 MRI lumbar spine  At the T12-L1 level, no significant  disk bulge, central canal stenosis, or neural foraminal stenosis is noted. Mild ligamentous hypertrophy is noted  At the L1-L2 level, no significant disk bulge, central canal stenosis, or neural foraminal stenosis is noted. Mild ligamentous hypertrophy is noted   At the L2-L3 level, minimal disk bulging may be noted one to 2 mm. Facet arthropathy and ligamentous hypertrophy is noted. Minimal bilateral neural foraminal narrowing is noted. The central canal is approximately 11 mm and may be minimally narrowed.  At the L3-L4 level, broad-based disk bulging appears to be present of approximately 3 to 4 mm with an asymmetric component greater towards the right neuroforamen a 4 mm that may relate to protrusion. Facet arthropathy and ligamentous hypertrophy is noted. Mild central canal stenosis is noted to 9 mm. Mild to moderate right neuroforaminal narrowing appears to present with possible contact of the exiting nerve root on the right. Mild left neural foraminal narrowing is noted.  At the L4-L5 level the disk osteophyte protrusion appears to be present paracentric to the right of approximately 5 mm. Mild to moderate central canal narrowing is noted to 8 mm. Mild to moderate right nerve foraminal narrowing is noted with mild left neuroforaminal narrowing. Contact of the exiting right nerve root may be present. Increased T2 signal is noted suggestive of an annular tear paracentric to the right  At the L5-S1 level, ligamentous hypertrophy is noted in. No significant disk bulge, central canal stenosis, or neural foraminal stenosis is noted.      Assessment:  The patient is a 69-year-old man with history of CAD and stent, hypertension, degenerative joint disease in the right shoulder and knee who presents in referral from Dr. Traore for help with pain.   1. Chronic pain of right knee     2. Chronic right shoulder pain     3. DDD (degenerative disc disease), lumbar     4. Chronic pain of left knee     5. Chronic pain  syndrome     6. Opioid contract exists         Plan:  1.  Dr. Giron provided prescriptions for morphine 30 milligrams twice a day and hydrocodone-acetaminophen 10/325 milligrams daily as needed.  The patient reports that this will be his last visit as he is transferring care to his wife's physician that is closer to home.  I have reviewed the [place] Board of Pharmacy website and there are no abberancies.

## 2020-09-09 ENCOUNTER — TELEPHONE (OUTPATIENT)
Dept: FAMILY MEDICINE | Facility: CLINIC | Age: 70
End: 2020-09-09

## 2020-09-09 ENCOUNTER — PATIENT MESSAGE (OUTPATIENT)
Dept: FAMILY MEDICINE | Facility: CLINIC | Age: 70
End: 2020-09-09

## 2020-09-09 DIAGNOSIS — D64.9 ANEMIA, UNSPECIFIED TYPE: ICD-10-CM

## 2020-09-09 DIAGNOSIS — Z13.6 ENCOUNTER FOR LIPID SCREENING FOR CARDIOVASCULAR DISEASE: ICD-10-CM

## 2020-09-09 DIAGNOSIS — Z13.220 ENCOUNTER FOR LIPID SCREENING FOR CARDIOVASCULAR DISEASE: ICD-10-CM

## 2020-09-09 DIAGNOSIS — Z00.00 ENCOUNTER FOR ANNUAL HEALTH EXAMINATION: Primary | ICD-10-CM

## 2020-09-09 DIAGNOSIS — Z12.5 SCREENING PSA (PROSTATE SPECIFIC ANTIGEN): ICD-10-CM

## 2020-09-09 DIAGNOSIS — I10 ESSENTIAL HYPERTENSION: ICD-10-CM

## 2020-09-09 DIAGNOSIS — Z11.9 ENCOUNTER FOR SCREENING EXAMINATION FOR INFECTIOUS DISEASE: ICD-10-CM

## 2020-09-09 NOTE — TELEPHONE ENCOUNTER
----- Message from Lynn Troare sent at 9/9/2020  1:20 PM CDT -----  Type:  Patient Returning Call    Who Called:Luigi  Who Left Message for Patient:  Does the patient know what this is regarding?:no  Would the patient rather a call back or a response via [facility]? call  Best Call Back Number:[phone]    Additional Information:

## 2020-09-09 NOTE — TELEPHONE ENCOUNTER
I have signed for the following orders AND/OR meds.  Please call the patient and ask the patient to schedule the testing AND/OR inform about any medications that were sent. Medications have been sent to pharmacy listed below      Orders Placed This Encounter   Procedures    Comprehensive metabolic panel     Standing Status:   Future     Number of Occurrences:   1     Standing Expiration Date:   9/9/2021    CBC auto differential     Standing Status:   Future     Number of Occurrences:   1     Standing Expiration Date:   9/9/2021    Lipid Panel     Standing Status:   Future     Standing Expiration Date:   9/9/2021    PSA, Screening     Standing Status:   Future     Standing Expiration Date:   9/10/2021    COVID-19 (SARS CoV-2) IgG Antibody     Standing Status:   Future     Standing Expiration Date:   11/8/2021              [facility] - [place], [place] - [address]  [address]  [place] [address]  Phone: [phone] Fax: [phone]    [facility] - [place], [place] - [address] [place], [place] [id number]  Phone: [phone] Fax: [phone]    [facility] - [place], [place] - [address]  [address]  [place] [address]  Phone: [phone] Fax: [phone]    [facility] - [place], [place] - [address] [place] [address]  Phone: [phone] Fax: [phone]

## 2020-09-11 ENCOUNTER — LAB VISIT (OUTPATIENT)
Dept: LAB | Facility: HOSPITAL | Age: 70
End: 2020-09-11
Attending: FAMILY MEDICINE
Payer: MEDICARE

## 2020-09-11 ENCOUNTER — TELEPHONE (OUTPATIENT)
Dept: FAMILY MEDICINE | Facility: CLINIC | Age: 70
End: 2020-09-11

## 2020-09-11 DIAGNOSIS — Z01.84 ENCOUNTER FOR ANTIBODY RESPONSE EXAMINATION: ICD-10-CM

## 2020-09-11 DIAGNOSIS — Z01.84 ENCOUNTER FOR ANTIBODY RESPONSE EXAMINATION: Primary | ICD-10-CM

## 2020-09-11 PROCEDURE — 36415 COLL VENOUS BLD VENIPUNCTURE: CPT | Mod: PO

## 2020-09-11 PROCEDURE — 86769 SARS-COV-2 COVID-19 ANTIBODY: CPT

## 2020-09-11 NOTE — TELEPHONE ENCOUNTER
----- Message from Gricel Ayala sent at 9/11/2020  1:40 PM CDT -----  Contact: [phone]  Would like to consult with the nurse,  patient has a Covid Test Today, at the office, patient states that he had something to eat, patient would like to speak with the nurse concerning  this, please call back thanks sj

## 2020-09-11 NOTE — TELEPHONE ENCOUNTER
Patient informed he does not have to be fasting for antibody testing.   COVID-antibody testing orders were sent to [facility]. Patient is coming in to clinic this afternoon. New order pended.

## 2020-09-12 LAB — SARS-COV-2 IGG SERPLBLD QL IA.RAPID: NEGATIVE

## 2020-09-13 LAB
ALBUMIN SERPL-MCNC: 4.2 G/DL (ref 3.6–5.1)
ALBUMIN/GLOB SERPL: 1.9 (CALC) (ref 1–2.5)
ALP SERPL-CCNC: 80 U/L (ref 35–144)
ALT SERPL-CCNC: 12 U/L (ref 9–46)
AST SERPL-CCNC: 16 U/L (ref 10–35)
BASOPHILS # BLD AUTO: 42 CELLS/UL (ref 0–200)
BASOPHILS NFR BLD AUTO: 0.8 %
BILIRUB SERPL-MCNC: 0.5 MG/DL (ref 0.2–1.2)
BUN SERPL-MCNC: 18 MG/DL (ref 7–25)
BUN/CREAT SERPL: 13 (CALC) (ref 6–22)
CALCIUM SERPL-MCNC: 9 MG/DL (ref 8.6–10.3)
CHLORIDE SERPL-SCNC: 103 MMOL/L (ref 98–110)
CHOLEST SERPL-MCNC: 143 MG/DL
CHOLEST/HDLC SERPL: 2.5 (CALC)
CO2 SERPL-SCNC: 29 MMOL/L (ref 20–32)
CREAT SERPL-MCNC: 1.35 MG/DL (ref 0.7–1.25)
EOSINOPHIL # BLD AUTO: 148 CELLS/UL (ref 15–500)
EOSINOPHIL NFR BLD AUTO: 2.8 %
ERYTHROCYTE [DISTWIDTH] IN BLOOD BY AUTOMATED COUNT: 12 % (ref 11–15)
GFRSERPLBLD MDRD-ARVRAT: 54 ML/MIN/1.73M2
GLOBULIN SER CALC-MCNC: 2.2 G/DL (CALC) (ref 1.9–3.7)
GLUCOSE SERPL-MCNC: 97 MG/DL (ref 65–99)
HCT VFR BLD AUTO: 37 % (ref 38.5–50)
HDLC SERPL-MCNC: 58 MG/DL
HGB BLD-MCNC: 12.2 G/DL (ref 13.2–17.1)
LDLC SERPL CALC-MCNC: 69 MG/DL (CALC)
LYMPHOCYTES # BLD AUTO: 1781 CELLS/UL (ref 850–3900)
LYMPHOCYTES NFR BLD AUTO: 33.6 %
MCH RBC QN AUTO: 30.1 PG (ref 27–33)
MCHC RBC AUTO-ENTMCNC: 33 G/DL (ref 32–36)
MCV RBC AUTO: 91.4 FL (ref 80–100)
MONOCYTES # BLD AUTO: 408 CELLS/UL (ref 200–950)
MONOCYTES NFR BLD AUTO: 7.7 %
NEUTROPHILS # BLD AUTO: 2920 CELLS/UL (ref 1500–7800)
NEUTROPHILS NFR BLD AUTO: 55.1 %
NONHDLC SERPL-MCNC: 85 MG/DL (CALC)
PLATELET # BLD AUTO: 166 THOUSAND/UL (ref 140–400)
PMV BLD REES-ECKER: 10 FL (ref 7.5–12.5)
POTASSIUM SERPL-SCNC: 4.5 MMOL/L (ref 3.5–5.3)
PROT SERPL-MCNC: 6.4 G/DL (ref 6.1–8.1)
PSA SERPL-MCNC: 0.4 NG/ML
RBC # BLD AUTO: 4.05 MILLION/UL (ref 4.2–5.8)
SODIUM SERPL-SCNC: 139 MMOL/L (ref 135–146)
TRIGL SERPL-MCNC: 82 MG/DL
WBC # BLD AUTO: 5.3 THOUSAND/UL (ref 3.8–10.8)

## 2020-09-18 DIAGNOSIS — D53.9 NUTRITIONAL ANEMIA, UNSPECIFIED: ICD-10-CM

## 2020-09-18 DIAGNOSIS — D64.9 ANEMIA, UNSPECIFIED TYPE: Primary | ICD-10-CM

## 2020-09-18 NOTE — TELEPHONE ENCOUNTER
Results have been released via [id number]. Please verify that these have been viewed by patient. If not, please call patient with results.    Please schedule the following orders:  Patient has labs scheduled in October for cardiology. The lipid and hepatic panel can be removed. Please add iron, ferritin, retic, path diff, b12, folate    I have sent a message to them with the following interpretation (see below).    I have reviewed your recent blood work.     Your complete blood count continues to show a drop in your blood counts. We should look into this further to find the cause. You have labs scheduled in October before you cardiology appointment. I will add some studies to these labs, including iron and vitamin levels.    Your metabolic panel which shows your electrolytes, glucose, kidney and liver function is within normal limits with exception for a mild decline of your kidney function. Make sure you are staying hydrated.   Make sure to avoid Advil, Aleve, Motrin or any other pain medications except for Tylenol. Take Tylenol as advised on bottle directions. We will repeat this with those labs in October.    Your cholesterol is within normal limits.    Your PSA level is within normal limits.      Please do not hesitate to call or message with any additional questions or concerns.

## 2020-09-29 ENCOUNTER — PATIENT MESSAGE (OUTPATIENT)
Dept: OTHER | Facility: OTHER | Age: 70
End: 2020-09-29

## 2020-10-16 NOTE — TELEPHONE ENCOUNTER
PT IS RESTING IN BED WITH NO S/S  OF DISTRESS NOTED. ASSESSMENT DONE. PT IS
A&O X3. PT DENIES PAIN AT THIS TIME. TELE IN PLACE. PT DENIES NEEDS AT THIS
TIME. CALL LIGHT IN REACH. Spoke with patient. He wanted to verify the number for our [place] location. He will try to schedule there. Advised pt to call back if needed. chaz

## 2020-10-26 ENCOUNTER — CLINICAL SUPPORT (OUTPATIENT)
Dept: CARDIOLOGY | Facility: CLINIC | Age: 70
End: 2020-10-26
Attending: INTERNAL MEDICINE
Payer: MEDICARE

## 2020-10-26 ENCOUNTER — LAB VISIT (OUTPATIENT)
Dept: LAB | Facility: HOSPITAL | Age: 70
End: 2020-10-26
Attending: INTERNAL MEDICINE
Payer: MEDICARE

## 2020-10-26 VITALS — HEIGHT: 71 IN | BODY MASS INDEX: 26.6 KG/M2 | WEIGHT: 190 LBS

## 2020-10-26 DIAGNOSIS — Z00.00 ENCOUNTER FOR ANNUAL HEALTH EXAMINATION: ICD-10-CM

## 2020-10-26 DIAGNOSIS — E78.5 HYPERLIPIDEMIA WITH TARGET LDL LESS THAN 100: ICD-10-CM

## 2020-10-26 DIAGNOSIS — Z79.899 ENCOUNTER FOR LONG-TERM (CURRENT) USE OF HIGH-RISK MEDICATION: ICD-10-CM

## 2020-10-26 DIAGNOSIS — Z95.5 STATUS POST CORONARY ARTERY STENT PLACEMENT: ICD-10-CM

## 2020-10-26 DIAGNOSIS — D64.9 ANEMIA, UNSPECIFIED TYPE: ICD-10-CM

## 2020-10-26 DIAGNOSIS — I25.10 CAD IN NATIVE ARTERY: ICD-10-CM

## 2020-10-26 DIAGNOSIS — D53.9 NUTRITIONAL ANEMIA, UNSPECIFIED: ICD-10-CM

## 2020-10-26 LAB
ALBUMIN SERPL BCP-MCNC: 4 G/DL (ref 3.5–5.2)
ALP SERPL-CCNC: 89 U/L (ref 55–135)
ALT SERPL W/O P-5'-P-CCNC: 18 U/L (ref 10–44)
ANION GAP SERPL CALC-SCNC: 8 MMOL/L (ref 8–16)
ANION GAP SERPL CALC-SCNC: 8 MMOL/L (ref 8–16)
ASCENDING AORTA: 2.65 CM
AST SERPL-CCNC: 22 U/L (ref 10–40)
AV INDEX (PROSTH): 0.99
AV MEAN GRADIENT: 3 MMHG
AV PEAK GRADIENT: 5 MMHG
AV VALVE AREA: 3.5 CM2
AV VELOCITY RATIO: 0.95
BASOPHILS # BLD AUTO: 0.04 K/UL (ref 0–0.2)
BASOPHILS NFR BLD: 0.7 % (ref 0–1.9)
BILIRUB SERPL-MCNC: 0.6 MG/DL (ref 0.1–1)
BNP SERPL-MCNC: 44 PG/ML (ref 0–99)
BSA FOR ECHO PROCEDURE: 2.08 M2
BUN SERPL-MCNC: 21 MG/DL (ref 8–23)
BUN SERPL-MCNC: 21 MG/DL (ref 8–23)
CALCIUM SERPL-MCNC: 8.8 MG/DL (ref 8.7–10.5)
CALCIUM SERPL-MCNC: 8.8 MG/DL (ref 8.7–10.5)
CHLORIDE SERPL-SCNC: 102 MMOL/L (ref 95–110)
CHLORIDE SERPL-SCNC: 102 MMOL/L (ref 95–110)
CO2 SERPL-SCNC: 28 MMOL/L (ref 23–29)
CO2 SERPL-SCNC: 28 MMOL/L (ref 23–29)
CREAT SERPL-MCNC: 1.5 MG/DL (ref 0.5–1.4)
CREAT SERPL-MCNC: 1.5 MG/DL (ref 0.5–1.4)
CV ECHO LV RWT: 0.42 CM
CV STRESS BASE HR: 74 BPM
DIASTOLIC BLOOD PRESSURE: 74 MMHG
DIFFERENTIAL METHOD: ABNORMAL
DOP CALC AO PEAK VEL: 1.09 M/S
DOP CALC AO VTI: 20.75 CM
DOP CALC LVOT AREA: 3.5 CM2
DOP CALC LVOT DIAMETER: 2.12 CM
DOP CALC LVOT PEAK VEL: 1.04 M/S
DOP CALC LVOT STROKE VOLUME: 72.71 CM3
DOP CALCLVOT PEAK VEL VTI: 20.61 CM
E WAVE DECELERATION TIME: 212.87 MSEC
E/A RATIO: 1.11
E/E' RATIO: 9.18 M/S
ECHO LV POSTERIOR WALL: 0.95 CM (ref 0.6–1.1)
EOSINOPHIL # BLD AUTO: 0.2 K/UL (ref 0–0.5)
EOSINOPHIL NFR BLD: 3.7 % (ref 0–8)
ERYTHROCYTE [DISTWIDTH] IN BLOOD BY AUTOMATED COUNT: 12.4 % (ref 11.5–14.5)
EST. GFR  (AFRICAN AMERICAN): 54.9 ML/MIN/1.73 M^2
EST. GFR  (AFRICAN AMERICAN): 54.9 ML/MIN/1.73 M^2
EST. GFR  (NON AFRICAN AMERICAN): 47.5 ML/MIN/1.73 M^2
EST. GFR  (NON AFRICAN AMERICAN): 47.5 ML/MIN/1.73 M^2
FERRITIN SERPL-MCNC: 31 NG/ML (ref 20–300)
FOLATE SERPL-MCNC: 10 NG/ML (ref 4–24)
FRACTIONAL SHORTENING: 35 % (ref 28–44)
GLUCOSE SERPL-MCNC: 108 MG/DL (ref 70–110)
GLUCOSE SERPL-MCNC: 108 MG/DL (ref 70–110)
HCT VFR BLD AUTO: 41.6 % (ref 40–54)
HGB BLD-MCNC: 13.5 G/DL (ref 14–18)
IMM GRANULOCYTES # BLD AUTO: 0.04 K/UL (ref 0–0.04)
IMM GRANULOCYTES NFR BLD AUTO: 0.7 % (ref 0–0.5)
INTERVENTRICULAR SEPTUM: 0.97 CM (ref 0.6–1.1)
IRON SERPL-MCNC: 93 UG/DL (ref 45–160)
IVRT: 83.73 MSEC
LA MAJOR: 4.5 CM
LA MINOR: 4.62 CM
LA WIDTH: 3.93 CM
LEFT ATRIUM SIZE: 3.34 CM
LEFT ATRIUM VOLUME INDEX: 24.7 ML/M2
LEFT ATRIUM VOLUME: 50.87 CM3
LEFT INTERNAL DIMENSION IN SYSTOLE: 2.96 CM (ref 2.1–4)
LEFT VENTRICLE DIASTOLIC VOLUME INDEX: 46.53 ML/M2
LEFT VENTRICLE DIASTOLIC VOLUME: 96.02 ML
LEFT VENTRICLE MASS INDEX: 72 G/M2
LEFT VENTRICLE SYSTOLIC VOLUME INDEX: 16.4 ML/M2
LEFT VENTRICLE SYSTOLIC VOLUME: 33.84 ML
LEFT VENTRICULAR INTERNAL DIMENSION IN DIASTOLE: 4.57 CM (ref 3.5–6)
LEFT VENTRICULAR MASS: 148.63 G
LV LATERAL E/E' RATIO: 7.8 M/S
LV SEPTAL E/E' RATIO: 11.14 M/S
LYMPHOCYTES # BLD AUTO: 1.5 K/UL (ref 1–4.8)
LYMPHOCYTES NFR BLD: 25.9 % (ref 18–48)
MCH RBC QN AUTO: 30.4 PG (ref 27–31)
MCHC RBC AUTO-ENTMCNC: 32.5 G/DL (ref 32–36)
MCV RBC AUTO: 94 FL (ref 82–98)
MONOCYTES # BLD AUTO: 0.4 K/UL (ref 0.3–1)
MONOCYTES NFR BLD: 7.5 % (ref 4–15)
MV PEAK A VEL: 0.7 M/S
MV PEAK E VEL: 0.78 M/S
NEUTROPHILS # BLD AUTO: 3.5 K/UL (ref 1.8–7.7)
NEUTROPHILS NFR BLD: 61.5 % (ref 38–73)
NRBC BLD-RTO: 0 /100 WBC
OHS CV CPX 1 MINUTE RECOVERY HEART RATE: 92 BPM
OHS CV CPX 85 PERCENT MAX PREDICTED HEART RATE MALE: 130
OHS CV CPX ESTIMATED METS: 7
OHS CV CPX MAX PREDICTED HEART RATE: 153
OHS CV CPX PATIENT IS FEMALE: 0
OHS CV CPX PATIENT IS MALE: 1
OHS CV CPX PEAK DIASTOLIC BLOOD PRESSURE: 75 MMHG
OHS CV CPX PEAK HEAR RATE: 121 BPM
OHS CV CPX PEAK RATE PRESSURE PRODUCT: NORMAL
OHS CV CPX PEAK SYSTOLIC BLOOD PRESSURE: 142 MMHG
OHS CV CPX PERCENT MAX PREDICTED HEART RATE ACHIEVED: 79
OHS CV CPX RATE PRESSURE PRODUCT PRESENTING: 7770
PATH REV BLD -IMP: NORMAL
PISA TR MAX VEL: 2.59 M/S
PLATELET # BLD AUTO: 162 K/UL (ref 150–350)
PMV BLD AUTO: 10.5 FL (ref 9.2–12.9)
POTASSIUM SERPL-SCNC: 4.5 MMOL/L (ref 3.5–5.1)
POTASSIUM SERPL-SCNC: 4.5 MMOL/L (ref 3.5–5.1)
PROT SERPL-MCNC: 7 G/DL (ref 6–8.4)
PULM VEIN S/D RATIO: 1.18
PV PEAK D VEL: 0.45 M/S
PV PEAK S VEL: 0.53 M/S
RA MAJOR: 4.23 CM
RA PRESSURE: 3 MMHG
RA WIDTH: 3.62 CM
RBC # BLD AUTO: 4.44 M/UL (ref 4.6–6.2)
RETICS/RBC NFR AUTO: 1.2 % (ref 0.4–2)
RIGHT VENTRICULAR END-DIASTOLIC DIMENSION: 3.64 CM
SATURATED IRON: 24 % (ref 20–50)
SINUS: 3.12 CM
SODIUM SERPL-SCNC: 138 MMOL/L (ref 136–145)
SODIUM SERPL-SCNC: 138 MMOL/L (ref 136–145)
STJ: 2.72 CM
STRESS ECHO POST EXERCISE DUR MIN: 4 MINUTES
STRESS ECHO POST EXERCISE DUR SEC: 2 SECONDS
SYSTOLIC BLOOD PRESSURE: 105 MMHG
TDI LATERAL: 0.1 M/S
TDI SEPTAL: 0.07 M/S
TDI: 0.09 M/S
TOTAL IRON BINDING CAPACITY: 385 UG/DL (ref 250–450)
TR MAX PG: 27 MMHG
TRANSFERRIN SERPL-MCNC: 260 MG/DL (ref 200–375)
TRICUSPID ANNULAR PLANE SYSTOLIC EXCURSION: 2.48 CM
TV REST PULMONARY ARTERY PRESSURE: 30 MMHG
VIT B12 SERPL-MCNC: 367 PG/ML (ref 210–950)
WBC # BLD AUTO: 5.75 K/UL (ref 3.9–12.7)

## 2020-10-26 PROCEDURE — 99211 OFF/OP EST MAY X REQ PHY/QHP: CPT | Mod: PBBFAC,PO,25

## 2020-10-26 PROCEDURE — 85025 COMPLETE CBC W/AUTO DIFF WBC: CPT

## 2020-10-26 PROCEDURE — 85060 BLOOD SMEAR INTERPRETATION: CPT | Mod: ,,, | Performed by: PATHOLOGY

## 2020-10-26 PROCEDURE — 36415 COLL VENOUS BLD VENIPUNCTURE: CPT | Mod: PO

## 2020-10-26 PROCEDURE — 80053 COMPREHEN METABOLIC PANEL: CPT

## 2020-10-26 PROCEDURE — 85045 AUTOMATED RETICULOCYTE COUNT: CPT

## 2020-10-26 PROCEDURE — 99999 PR PBB SHADOW E&M-EST. PATIENT-LVL I: ICD-10-PCS | Mod: PBBFAC,,,

## 2020-10-26 PROCEDURE — 93351 STRESS TTE COMPLETE: CPT | Mod: PBBFAC,PO | Performed by: INTERNAL MEDICINE

## 2020-10-26 PROCEDURE — 82607 VITAMIN B-12: CPT

## 2020-10-26 PROCEDURE — 99999 PR PBB SHADOW E&M-EST. PATIENT-LVL I: CPT | Mod: PBBFAC,,,

## 2020-10-26 PROCEDURE — 85060 PATHOLOGIST REVIEW: ICD-10-PCS | Mod: ,,, | Performed by: PATHOLOGY

## 2020-10-26 PROCEDURE — 82728 ASSAY OF FERRITIN: CPT

## 2020-10-26 PROCEDURE — 83540 ASSAY OF IRON: CPT

## 2020-10-26 PROCEDURE — 82746 ASSAY OF FOLIC ACID SERUM: CPT

## 2020-10-26 PROCEDURE — 93351 STRESS ECHO (CUPID ONLY): ICD-10-PCS | Mod: 26,S$PBB,, | Performed by: INTERNAL MEDICINE

## 2020-10-26 PROCEDURE — 83880 ASSAY OF NATRIURETIC PEPTIDE: CPT

## 2020-10-27 ENCOUNTER — OFFICE VISIT (OUTPATIENT)
Dept: FAMILY MEDICINE | Facility: CLINIC | Age: 70
End: 2020-10-27
Payer: MEDICARE

## 2020-10-27 ENCOUNTER — TELEPHONE (OUTPATIENT)
Dept: FAMILY MEDICINE | Facility: CLINIC | Age: 70
End: 2020-10-27

## 2020-10-27 VITALS
SYSTOLIC BLOOD PRESSURE: 98 MMHG | HEART RATE: 81 BPM | WEIGHT: 188 LBS | BODY MASS INDEX: 26.32 KG/M2 | DIASTOLIC BLOOD PRESSURE: 60 MMHG | TEMPERATURE: 97 F | HEIGHT: 71 IN

## 2020-10-27 DIAGNOSIS — M51.36 DDD (DEGENERATIVE DISC DISEASE), LUMBAR: ICD-10-CM

## 2020-10-27 DIAGNOSIS — N28.9 RENAL INSUFFICIENCY: Primary | ICD-10-CM

## 2020-10-27 DIAGNOSIS — I10 ESSENTIAL HYPERTENSION: ICD-10-CM

## 2020-10-27 DIAGNOSIS — Z23 INFLUENZA VACCINE NEEDED: ICD-10-CM

## 2020-10-27 DIAGNOSIS — I25.10 CAD IN NATIVE ARTERY: ICD-10-CM

## 2020-10-27 DIAGNOSIS — F32.5 DEPRESSION, MAJOR, IN REMISSION: ICD-10-CM

## 2020-10-27 LAB — PATH REV BLD -IMP: NORMAL

## 2020-10-27 PROCEDURE — 99999 PR PBB SHADOW E&M-EST. PATIENT-LVL III: ICD-10-PCS | Mod: PBBFAC,,, | Performed by: INTERNAL MEDICINE

## 2020-10-27 PROCEDURE — 99213 OFFICE O/P EST LOW 20 MIN: CPT | Mod: S$PBB,,, | Performed by: INTERNAL MEDICINE

## 2020-10-27 PROCEDURE — 99999 PR PBB SHADOW E&M-EST. PATIENT-LVL III: CPT | Mod: PBBFAC,,, | Performed by: INTERNAL MEDICINE

## 2020-10-27 PROCEDURE — G0008 ADMIN INFLUENZA VIRUS VAC: HCPCS | Mod: PBBFAC,PO

## 2020-10-27 PROCEDURE — 90694 VACC AIIV4 NO PRSRV 0.5ML IM: CPT | Mod: PBBFAC,PO

## 2020-10-27 PROCEDURE — 99213 PR OFFICE/OUTPT VISIT, EST, LEVL III, 20-29 MIN: ICD-10-PCS | Mod: S$PBB,,, | Performed by: INTERNAL MEDICINE

## 2020-10-27 PROCEDURE — 99213 OFFICE O/P EST LOW 20 MIN: CPT | Mod: PBBFAC,PO | Performed by: INTERNAL MEDICINE

## 2020-10-27 RX ORDER — MORPHINE SULFATE 30 MG/1
30 TABLET, FILM COATED, EXTENDED RELEASE ORAL 3 TIMES DAILY
COMMUNITY
Start: 2020-10-02

## 2020-10-27 NOTE — ASSESSMENT & PLAN NOTE
-managed by pain management, Dr. Giron  -remains on chronic opiates via pain clinic  -also has PRN muscle relaxants

## 2020-10-27 NOTE — ASSESSMENT & PLAN NOTE
-at goal today  -currently on lisinopril 5 mg and toprol 25 mg QD  -continue lifestyle modification with low sodium diet and exercise   -discussed hypertension disease course and importance of treating high blood pressure  -patient understood and advised of risk of untreated blood pressure.  ER precautions were given   for symptoms of hypertensive urgency and emergency.

## 2020-10-27 NOTE — TELEPHONE ENCOUNTER
Patient is due for a follow-up appointment with me.  His last visit was November of last year.  We can discuss his recent labs at that visit as well.

## 2020-10-27 NOTE — PROGRESS NOTES
Assessment/Plan:    DDD (degenerative disc disease), lumbar  -managed by pain management, Dr. Giron  -remains on chronic opiates via pain clinic  -also has PRN muscle relaxants    Depression, major, in remission  -currently on wellbutrin with control of symptoms  -denies adverse effects of medication  -denies active symptoms of depression or anxiety    CAD in native artery  -S/p PCI x 2  -also with hx of stable angina, on ranexa  -followed by Dr. Barros  -on ASA/statin and metoprolol    Hypertension  -at goal today  -currently on lisinopril 5 mg and toprol 25 mg QD  -continue lifestyle modification with low sodium diet and exercise   -discussed hypertension disease course and importance of treating high blood pressure  -patient understood and advised of risk of untreated blood pressure.  ER precautions were given   for symptoms of hypertensive urgency and emergency.      Renal insufficiency  -decline in renal function recently  -suspect due to NSAID use  -BP well controlled  -on ACE-I  -repeat renal function in several months to see if it improves  _____________________________________________________________________________________________________________________________________________________    Orders this visit:    Renal insufficiency  -     Renal Function Panel; Future; Expected date: 10/27/2020    Essential hypertension    Influenza vaccine needed  -     Influenza - Quadrivalent (Adjuvanted)    DDD (degenerative disc disease), lumbar    Depression, major, in remission    CAD in native artery      Follow up in about 6 months (around 4/27/2021).    Iveth Stafford MD  _____________________________________________________________________________________________________________________________________________________    HPI:    Patient is in clinic today as an established patient here for follow up of chronic medical conditions.    CAD:  CC followed by cardiology.  Remains on Ranexa.  Off on his aspirin and  statin.  Recently had stress test and echocardiogram and has follow-up with cardiology next month.    Degenerative disc disease/osteoarthritis:  Continues to be followed by pain management.  Also followed by orthopedics for osteoarthritis.  Plan for knee replacement soon.  Remains on chronic opiates, along with as needed muscle relaxants.    HTN: The patient is currently being treated for essential hypertension. This condition is chronic and stable. The patient is tolerating their medication well with good compliance.  Denies any adverse effects of medications.  Counseling was offered regarding low sodium diet.  The patient has a reduced salt intake. Routine exercise recommended. The patient denies headache, vision changes, chest pain, palpitations, shortness of breath, or lower extremity edema.    No new complaints today.  Routine health maintenance reviewed.  Flu shot today.    Past Medical History:  Past Medical History:   Diagnosis Date    BPH (benign prostatic hypertrophy) with urinary obstruction     CAD (coronary artery disease) May and Yarely 2011    x2    Chronic pain     DDD (degenerative disc disease), lumbar     Depression, major, in remission     Hyperlipidemia LDL goal < 100     Hypertension     MRSA cellulitis     Osteoarthritis      Past Surgical History:   Procedure Laterality Date    COLONOSCOPY N/A 12/17/2019    Procedure: COLONOSCOPY;  Surgeon: Diann Negrete MD;  [facility];  Service: Endoscopy;  Laterality: N/A;    CORONARY ANGIOPLASTY      2 Stents placed a few year ago    Epidural steroid injection      Pain management    INJURY      KNEE SURGERY      Right, x2    MANDIBLE FRACTURE SURGERY      accident during  service    REPAIR OF LIGAMENT OF SHOULDER Right     ROTATOR CUFF REPAIR      right times 2    VASECTOMY       Review of patient's allergies indicates:   Allergen Reactions    Acetaminophen-codeine Rash     #3    Codeine Nausea Only     Social  History     Tobacco Use    Smoking status: Former Smoker     Packs/day: 1.00     Years: 25.00     Pack years: 25.00     Quit date: 2007     Years since quittin.4    Smokeless tobacco: Never Used   Substance Use Topics    Alcohol use: No    Drug use: Never     Family History   Problem Relation Age of Onset    Emphysema Mother     Cancer Mother         unknown    Heart failure Mother     Prostate cancer Brother     Diabetes Paternal Aunt      Current Outpatient Medications on File Prior to Visit   Medication Sig Dispense Refill    aspirin (ECOTRIN) 81 MG EC tablet Take 81 mg by mouth once daily.       atorvastatin (LIPITOR) 40 MG tablet TAKE 1 TABLET DAILY 90 tablet 3    buPROPion (WELLBUTRIN XL) 300 MG 24 hr tablet TAKE 1 TABLET DAILY 90 tablet 1    cyclobenzaprine (FLEXERIL) 10 MG tablet TAKE 1 TABLET NIGHTLY AS NEEDED FOR MUSCLE SPASMS 90 tablet 3    lisinopril (PRINIVIL,ZESTRIL) 5 MG tablet Take 1 tablet (5 mg total) by mouth once daily. 90 tablet 3    morphine (MS CONTIN) 30 MG 12 hr tablet Take 30 mg by mouth 3 (three) times daily.      ranolazine (RANEXA) 500 MG Tb12 TAKE 1 TABLET NIGHTLY 90 tablet 1    tamsulosin (FLOMAX) 0.4 mg Cap TAKE 2 CAPSULES EVERY EVENING 180 capsule 3    TOPROL XL 25 mg 24 hr tablet TAKE ONE-HALF (1/2) TABLET DAILY 45 tablet 3     No current facility-administered medications on file prior to visit.        Review of Systems   Constitutional: Negative for chills, diaphoresis, fatigue and fever.   HENT: Negative for congestion, ear pain, postnasal drip, sinus pain and sore throat.    Eyes: Negative for pain and redness.   Respiratory: Negative for cough, chest tightness and shortness of breath.    Cardiovascular: Negative for chest pain and leg swelling.   Gastrointestinal: Negative for abdominal pain, constipation, diarrhea, nausea and vomiting.   Genitourinary: Negative for dysuria and hematuria.   Musculoskeletal: Positive for arthralgias. Negative for joint  "swelling.   Skin: Negative for rash.   Neurological: Negative for dizziness, syncope and headaches.       Vitals:    10/27/20 1407   BP: 98/60   Pulse: 81   Temp: 97 °F (36.1 °C)   Weight: 85.3 kg (188 lb)   Height: 5' 11" (1.803 m)       Wt Readings from Last 3 Encounters:   10/27/20 85.3 kg (188 lb)   10/26/20 86.2 kg (190 lb)   06/11/20 86.5 kg (190 lb 12.9 oz)       Physical Exam  Constitutional:       General: He is not in acute distress.     Appearance: Normal appearance. He is well-developed.   HENT:      Head: Normocephalic and atraumatic.   Eyes:      Conjunctiva/sclera: Conjunctivae normal.   Neck:      Musculoskeletal: Normal range of motion and neck supple.   Cardiovascular:      Rate and Rhythm: Normal rate and regular rhythm.      Pulses: Normal pulses.      Heart sounds: Normal heart sounds. No murmur.   Pulmonary:      Effort: Pulmonary effort is normal. No respiratory distress.      Breath sounds: Normal breath sounds.   Abdominal:      General: Bowel sounds are normal. There is no distension.      Palpations: Abdomen is soft.      Tenderness: There is no abdominal tenderness.   Musculoskeletal: Normal range of motion.   Skin:     General: Skin is warm and dry.      Findings: No rash.   Neurological:      General: No focal deficit present.      Mental Status: He is alert and oriented to person, place, and time.         Health Maintenance   Topic Date Due    Pneumococcal Vaccine (65+ Low/Medium Risk) (2 of 2 - PPSV23) 01/05/2021    Lipid Panel  09/12/2021    High Dose Statin  10/27/2021    Aspirin/Antiplatelet Therapy  10/27/2021    TETANUS VACCINE  10/15/2028    Hepatitis C Screening  Completed    Abdominal Aortic Aneurysm Screening  Completed       "

## 2020-10-27 NOTE — ASSESSMENT & PLAN NOTE
-S/p PCI x 2  -also with hx of stable angina, on ranexa  -followed by Dr. Barros  -on ASA/statin and metoprolol

## 2020-10-27 NOTE — ASSESSMENT & PLAN NOTE
-currently on wellbutrin with control of symptoms  -denies adverse effects of medication  -denies active symptoms of depression or anxiety

## 2020-11-16 ENCOUNTER — OFFICE VISIT (OUTPATIENT)
Dept: FAMILY MEDICINE | Facility: CLINIC | Age: 70
End: 2020-11-16
Payer: MEDICARE

## 2020-11-16 VITALS
HEART RATE: 73 BPM | DIASTOLIC BLOOD PRESSURE: 66 MMHG | SYSTOLIC BLOOD PRESSURE: 125 MMHG | WEIGHT: 185.63 LBS | BODY MASS INDEX: 25.99 KG/M2 | TEMPERATURE: 97 F | HEIGHT: 71 IN

## 2020-11-16 DIAGNOSIS — F32.5 DEPRESSION, MAJOR, IN REMISSION: ICD-10-CM

## 2020-11-16 DIAGNOSIS — I25.10 CAD IN NATIVE ARTERY: ICD-10-CM

## 2020-11-16 DIAGNOSIS — N39.0 URINARY TRACT INFECTION WITHOUT HEMATURIA, SITE UNSPECIFIED: Primary | ICD-10-CM

## 2020-11-16 PROCEDURE — 99213 OFFICE O/P EST LOW 20 MIN: CPT | Mod: PBBFAC,PO | Performed by: FAMILY MEDICINE

## 2020-11-16 PROCEDURE — 99999 PR PBB SHADOW E&M-EST. PATIENT-LVL III: ICD-10-PCS | Mod: PBBFAC,,, | Performed by: FAMILY MEDICINE

## 2020-11-16 PROCEDURE — 99213 OFFICE O/P EST LOW 20 MIN: CPT | Mod: S$PBB,,, | Performed by: FAMILY MEDICINE

## 2020-11-16 PROCEDURE — 99213 PR OFFICE/OUTPT VISIT, EST, LEVL III, 20-29 MIN: ICD-10-PCS | Mod: S$PBB,,, | Performed by: FAMILY MEDICINE

## 2020-11-16 PROCEDURE — 99999 PR PBB SHADOW E&M-EST. PATIENT-LVL III: CPT | Mod: PBBFAC,,, | Performed by: FAMILY MEDICINE

## 2020-11-16 RX ORDER — BUPROPION HYDROCHLORIDE 300 MG/1
300 TABLET ORAL DAILY
Qty: 90 TABLET | Refills: 1 | Status: SHIPPED | OUTPATIENT
Start: 2020-11-16 | End: 2021-05-15

## 2020-11-16 RX ORDER — RANOLAZINE 500 MG/1
500 TABLET, EXTENDED RELEASE ORAL NIGHTLY
Qty: 90 TABLET | Refills: 1 | Status: SHIPPED | OUTPATIENT
Start: 2020-11-16 | End: 2021-05-15

## 2020-11-16 RX ORDER — DOXYCYCLINE 100 MG/1
100 CAPSULE ORAL EVERY 12 HOURS
Qty: 20 CAPSULE | Refills: 0 | Status: SHIPPED | OUTPATIENT
Start: 2020-11-16 | End: 2020-12-16

## 2020-11-16 NOTE — PROGRESS NOTES
The patient presents with a 3 day history of superpubic pain radiating to both groins and testicles but no dysuria and frequency,denies flank pain,vomiting or fever. Had a similar epispde years ago resolved w AB.    Past Medical History:  Past Medical History:   Diagnosis Date    BPH (benign prostatic hypertrophy) with urinary obstruction     CAD (coronary artery disease) May and Yarely 2011    x2    Chronic pain     DDD (degenerative disc disease), lumbar     Depression, major, in remission     Hyperlipidemia LDL goal < 100     Hypertension     MRSA cellulitis     Osteoarthritis      Past Surgical History:   Procedure Laterality Date    COLONOSCOPY N/A 12/17/2019    Procedure: COLONOSCOPY;  Surgeon: Diann Negrete MD;  Location: [facility];  Service: Endoscopy;  Laterality: N/A;    CORONARY ANGIOPLASTY      2 Stents placed a few year ago    Epidural steroid injection      Pain management    INJURY      KNEE SURGERY      Right, x2    MANDIBLE FRACTURE SURGERY      accident during  service    REPAIR OF LIGAMENT OF SHOULDER Right     ROTATOR CUFF REPAIR      right times 2    VASECTOMY       Review of patient's allergies indicates:   Allergen Reactions    Acetaminophen-codeine Rash     #3    Codeine Nausea Only     Current Outpatient Medications on File Prior to Visit   Medication Sig Dispense Refill    aspirin (ECOTRIN) 81 MG EC tablet Take 81 mg by mouth once daily.       atorvastatin (LIPITOR) 40 MG tablet TAKE 1 TABLET DAILY 90 tablet 3    buPROPion (WELLBUTRIN XL) 300 MG 24 hr tablet TAKE 1 TABLET DAILY 90 tablet 1    cyclobenzaprine (FLEXERIL) 10 MG tablet TAKE 1 TABLET NIGHTLY AS NEEDED FOR MUSCLE SPASMS 90 tablet 3    lisinopriL (PRINIVIL,ZESTRIL) 5 MG tablet TAKE 1 TABLET DAILY 90 tablet 3    morphine (MS CONTIN) 30 MG 12 hr tablet Take 30 mg by mouth 3 (three) times daily.      ranolazine (RANEXA) 500 MG Tb12 TAKE 1 TABLET NIGHTLY 90 tablet 1    tamsulosin (FLOMAX) 0.4 mg  Cap TAKE 2 CAPSULES EVERY EVENING 180 capsule 3    TOPROL XL 25 mg 24 hr tablet TAKE ONE-HALF (1/2) TABLET DAILY 45 tablet 3     No current facility-administered medications on file prior to visit.      Social History     Socioeconomic History    Marital status:      Spouse name: Not on file    Number of children: Not on file    Years of education: Not on file    Highest education level: Not on file   Occupational History    Not on file   Social Needs    Financial resource strain: Not on file    Food insecurity     Worry: Not on file     Inability: Not on file    Transportation needs     Medical: Not on file     Non-medical: Not on file   Tobacco Use    Smoking status: Former Smoker     Packs/day: 1.00     Years: 25.00     Pack years: 25.00     Quit date: 2007     Years since quittin.5    Smokeless tobacco: Never Used   Substance and Sexual Activity    Alcohol use: No    Drug use: Never    Sexual activity: Not on file   Lifestyle    Physical activity     Days per week: Not on file     Minutes per session: Not on file    Stress: Not on file   Relationships    Social connections     Talks on phone: Not on file     Gets together: Not on file     Attends Baptism service: Not on file     Active member of club or organization: Not on file     Attends meetings of clubs or organizations: Not on file     Relationship status: Not on file   Other Topics Concern    Not on file   Social History Narrative    Not on file     Family History   Problem Relation Age of Onset    Emphysema Mother     Cancer Mother         unknown    Heart failure Mother     Prostate cancer Brother     Diabetes Paternal Aunt        ROS: Denies weight loss  Respiratory/CV: No cough dyspnea or chest pain  GI:No nausea vomiting diarrhea  :No frequency dysuria  SKIN:No rashes or change in texture    PE Vital signs noted  Heent: Normocephalic,with no recent trauma,PERRLA,EOMI,conjunctiva clear with no  exudate.Nasopharynx is not injected .Otic canal.TMs are not affected.Pharynx is normal.  Neck:Supple without adenopathy  Chest:Clear bilateral breath sounds normal  Heart:Regular rhthym without murmer  Abdomen:Soft, mild superpubic tenderness,no rebound,no masses, no hepatosplenomegaly,no flank tenderness  Extremeties and Neurologic:Grossly within normal limits           Impression: .0     Plan:   Defered  exam incl [place]  Rev risks of other differential dx eg diveticular disease-obs and report any changes  Doxycycline 100 bid

## 2020-11-30 ENCOUNTER — OFFICE VISIT (OUTPATIENT)
Dept: CARDIOLOGY | Facility: CLINIC | Age: 70
End: 2020-11-30
Attending: INTERNAL MEDICINE
Payer: MEDICARE

## 2020-11-30 VITALS
HEIGHT: 71 IN | BODY MASS INDEX: 26.57 KG/M2 | DIASTOLIC BLOOD PRESSURE: 68 MMHG | HEART RATE: 75 BPM | WEIGHT: 189.81 LBS | SYSTOLIC BLOOD PRESSURE: 104 MMHG

## 2020-11-30 DIAGNOSIS — Z95.5 STATUS POST CORONARY ARTERY STENT PLACEMENT: Primary | ICD-10-CM

## 2020-11-30 DIAGNOSIS — I10 ESSENTIAL HYPERTENSION: ICD-10-CM

## 2020-11-30 DIAGNOSIS — E78.5 HYPERLIPIDEMIA WITH TARGET LDL LESS THAN 100: ICD-10-CM

## 2020-11-30 PROCEDURE — 99999 PR PBB SHADOW E&M-EST. PATIENT-LVL III: CPT | Mod: PBBFAC,,, | Performed by: INTERNAL MEDICINE

## 2020-11-30 PROCEDURE — 99214 PR OFFICE/OUTPT VISIT, EST, LEVL IV, 30-39 MIN: ICD-10-PCS | Mod: S$PBB,,, | Performed by: INTERNAL MEDICINE

## 2020-11-30 PROCEDURE — 99213 OFFICE O/P EST LOW 20 MIN: CPT | Mod: PBBFAC,PO | Performed by: INTERNAL MEDICINE

## 2020-11-30 PROCEDURE — 99999 PR PBB SHADOW E&M-EST. PATIENT-LVL III: ICD-10-PCS | Mod: PBBFAC,,, | Performed by: INTERNAL MEDICINE

## 2020-11-30 PROCEDURE — 99214 OFFICE O/P EST MOD 30 MIN: CPT | Mod: S$PBB,,, | Performed by: INTERNAL MEDICINE

## 2020-11-30 NOTE — PROGRESS NOTES
Subjective:    Patient ID:  Luigi Murillo is a 69 y.o. male who presents for follow-up of cad    HPI  He comes for follow up with no major problems, no chest pain, no shortness of breath.  Left leg swelling is main issue    Review of Systems   Constitution: Negative for decreased appetite, malaise/fatigue, weight gain and weight loss.   Cardiovascular: Negative for chest pain, dyspnea on exertion, leg swelling, palpitations and syncope.   Respiratory: Negative for cough and shortness of breath.    Gastrointestinal: Negative.    Neurological: Negative for weakness.   All other systems reviewed and are negative.       Objective:      Physical Exam   Constitutional: He is oriented to person, place, and time. He appears well-developed and well-nourished.   HENT:   Head: Normocephalic.   Eyes: Pupils are equal, round, and reactive to light.   Neck: Normal range of motion. Neck supple. No JVD present. Carotid bruit is not present. No thyromegaly present.   Cardiovascular: Normal rate, regular rhythm, normal heart sounds, intact distal pulses and normal pulses. PMI is not displaced. Exam reveals no gallop.   No murmur heard.  Pulmonary/Chest: Effort normal and breath sounds normal.   Abdominal: Soft. Normal appearance. He exhibits no mass. There is no hepatosplenomegaly. There is no abdominal tenderness.   Musculoskeletal: Normal range of motion.         General: No edema.   Neurological: He is alert and oriented to person, place, and time. He has normal strength and normal reflexes. No sensory deficit.   Skin: Skin is warm and intact.   Psychiatric: He has a normal mood and affect.   Nursing note and vitals reviewed.    Stress echo, labs reviewed      Assessment:       1. Status post coronary artery stent placement    2. Hyperlipidemia with target LDL less than 100    3. Essential hypertension         Plan:   Support hose  Continue all cardiac medications  Regular exercise program  Weight loss  1 yr f/u with k  vee

## 2020-12-11 ENCOUNTER — PATIENT MESSAGE (OUTPATIENT)
Dept: OTHER | Facility: OTHER | Age: 70
End: 2020-12-11

## 2020-12-16 ENCOUNTER — OFFICE VISIT (OUTPATIENT)
Dept: FAMILY MEDICINE | Facility: CLINIC | Age: 70
End: 2020-12-16
Payer: MEDICARE

## 2020-12-16 ENCOUNTER — HOSPITAL ENCOUNTER (OUTPATIENT)
Dept: RADIOLOGY | Facility: HOSPITAL | Age: 70
Discharge: HOME | End: 2020-12-16
Attending: FAMILY MEDICINE
Payer: MEDICARE

## 2020-12-16 ENCOUNTER — TELEPHONE (OUTPATIENT)
Dept: FAMILY MEDICINE | Facility: CLINIC | Age: 70
End: 2020-12-16

## 2020-12-16 VITALS
HEIGHT: 71 IN | BODY MASS INDEX: 26.04 KG/M2 | DIASTOLIC BLOOD PRESSURE: 83 MMHG | HEART RATE: 111 BPM | RESPIRATION RATE: 18 BRPM | WEIGHT: 186 LBS | TEMPERATURE: 97 F | SYSTOLIC BLOOD PRESSURE: 149 MMHG

## 2020-12-16 DIAGNOSIS — S63.502A SPRAIN OF LEFT WRIST, INITIAL ENCOUNTER: Primary | ICD-10-CM

## 2020-12-16 DIAGNOSIS — S63.502A SPRAIN OF LEFT WRIST, INITIAL ENCOUNTER: ICD-10-CM

## 2020-12-16 PROCEDURE — 99999 PR PBB SHADOW E&M-EST. PATIENT-LVL IV: ICD-10-PCS | Mod: PBBFAC,,, | Performed by: FAMILY MEDICINE

## 2020-12-16 PROCEDURE — 73110 X-RAY EXAM OF WRIST: CPT | Mod: TC,PO,LT

## 2020-12-16 PROCEDURE — 73140 X-RAY EXAM OF FINGER(S): CPT | Mod: 26,LT,, | Performed by: RADIOLOGY

## 2020-12-16 PROCEDURE — 73140 X-RAY EXAM OF FINGER(S): CPT | Mod: TC,PO

## 2020-12-16 PROCEDURE — 73110 XR WRIST COMPLETE 3 VIEWS LEFT: ICD-10-PCS | Mod: 26,LT,, | Performed by: RADIOLOGY

## 2020-12-16 PROCEDURE — 99999 PR PBB SHADOW E&M-EST. PATIENT-LVL IV: CPT | Mod: PBBFAC,,, | Performed by: FAMILY MEDICINE

## 2020-12-16 PROCEDURE — 99214 OFFICE O/P EST MOD 30 MIN: CPT | Mod: PBBFAC,25,PO | Performed by: FAMILY MEDICINE

## 2020-12-16 PROCEDURE — 99213 PR OFFICE/OUTPT VISIT, EST, LEVL III, 20-29 MIN: ICD-10-PCS | Mod: S$PBB,,, | Performed by: FAMILY MEDICINE

## 2020-12-16 PROCEDURE — 73140 XR FINGER 2 OR MORE VIEWS: ICD-10-PCS | Mod: 26,LT,, | Performed by: RADIOLOGY

## 2020-12-16 PROCEDURE — 73110 X-RAY EXAM OF WRIST: CPT | Mod: 26,LT,, | Performed by: RADIOLOGY

## 2020-12-16 PROCEDURE — 99213 OFFICE O/P EST LOW 20 MIN: CPT | Mod: S$PBB,,, | Performed by: FAMILY MEDICINE

## 2020-12-16 RX ORDER — HYDROCODONE BITARTRATE AND ACETAMINOPHEN 7.5; 325 MG/1; MG/1
1 TABLET ORAL EVERY 6 HOURS PRN
Qty: 28 TABLET | Refills: 0 | Status: SHIPPED | OUTPATIENT
Start: 2020-12-16 | End: 2022-08-24

## 2020-12-16 NOTE — PROGRESS NOTES
The patient presents with tender painful wrist and thumb for the past 3 days    but no overt trauma.Follows w PMR and hx HBP and CKD 3  ROS:  General: No fever or sig wt change  HEENT:No other PND eye pain or dc  Respiratory: No cough wheezing  PE: vital signs noted     Chest:Clear bilateral breath sounds   Heart:Regular rhthym without murmer  Abdomen:Soft, non tender,no masses, no hepatosplenomegaly  Extremeties Tender radial wrist  Luigi was seen today for wrist pain.    Diagnoses and all orders for this visit:    Sprain of left wrist, initial encounter  -     X-Ray Wrist Complete Left; Future  -     X-Ray Finger 2 or More Views; Future    Other orders  -     HYDROcodone-acetaminophen (NORCO) 7.5-325 mg per tablet; Take 1 tablet by mouth every 6 (six) hours as needed for Pain.    XR neg for frx but explained to pt non displaced fracture might not be evident-splint and if not improving in a few days rec Ortho con-pt follows w Dr Galvin    Answers for HPI/ROS submitted by the patient on 12/15/2020   activity change: No  unexpected weight change: No  neck pain: No  hearing loss: No  rhinorrhea: No  trouble swallowing: No  eye discharge: No  visual disturbance: No  chest tightness: No  wheezing: No  chest pain: Yes  palpitations: No  blood in stool: No  constipation: No  vomiting: No  diarrhea: No  polydipsia: No  polyuria: No  difficulty urinating: No  urgency: No  hematuria: No  joint swelling: No  arthralgias: Yes  headaches: No  weakness: No  confusion: No  dysphoric mood: Yes

## 2020-12-16 NOTE — TELEPHONE ENCOUNTER
----- Message from Alexandrea Bowles sent at 12/16/2020  4:34 PM CST -----  Contact: [facility]  Type:  Pharmacy Calling to Clarify an RX    Name of Caller:Eloise  Pharmacy Name:[facility]  Prescription Name:HYDROcodone-acetaminophen (NORCO) 7.5-325 mg per tablet  What do they need to clarify?:pt has a higher dose of morphine, would like to know if they should fill Norco  Best Call Back Number:[phone]  Additional Information:

## 2020-12-17 ENCOUNTER — TELEPHONE (OUTPATIENT)
Dept: FAMILY MEDICINE | Facility: CLINIC | Age: 70
End: 2020-12-17

## 2020-12-17 NOTE — TELEPHONE ENCOUNTER
----- Message from Lissa Moreno LPN sent at 12/17/2020 11:24 AM CST -----  Regarding: Patient called  Please call patient. Pt wife reports his hand is swollen twice the size of yesterday.

## 2020-12-17 NOTE — TELEPHONE ENCOUNTER
Advised patient to ice and elevate. Patient states that he has done all of this and it has just gotten worse. I advised patient to go to the ED for evaluation.

## 2021-01-19 ENCOUNTER — TELEPHONE (OUTPATIENT)
Dept: CARDIOLOGY | Facility: CLINIC | Age: 71
End: 2021-01-19

## 2021-02-09 ENCOUNTER — PATIENT MESSAGE (OUTPATIENT)
Dept: FAMILY MEDICINE | Facility: CLINIC | Age: 71
End: 2021-02-09

## 2021-02-26 ENCOUNTER — LAB VISIT (OUTPATIENT)
Dept: LAB | Facility: HOSPITAL | Age: 71
End: 2021-02-26
Attending: INTERNAL MEDICINE
Payer: MEDICARE

## 2021-02-26 DIAGNOSIS — N28.9 RENAL INSUFFICIENCY: ICD-10-CM

## 2021-02-26 PROCEDURE — 80069 RENAL FUNCTION PANEL: CPT

## 2021-02-26 PROCEDURE — 36415 COLL VENOUS BLD VENIPUNCTURE: CPT | Mod: PO

## 2021-02-27 LAB
ALBUMIN SERPL BCP-MCNC: 3.8 G/DL (ref 3.5–5.2)
ANION GAP SERPL CALC-SCNC: 6 MMOL/L (ref 8–16)
BUN SERPL-MCNC: 15 MG/DL (ref 8–23)
CALCIUM SERPL-MCNC: 8.6 MG/DL (ref 8.7–10.5)
CHLORIDE SERPL-SCNC: 106 MMOL/L (ref 95–110)
CO2 SERPL-SCNC: 28 MMOL/L (ref 23–29)
CREAT SERPL-MCNC: 1.2 MG/DL (ref 0.5–1.4)
EST. GFR  (AFRICAN AMERICAN): >60 ML/MIN/1.73 M^2
EST. GFR  (NON AFRICAN AMERICAN): >60 ML/MIN/1.73 M^2
GLUCOSE SERPL-MCNC: 107 MG/DL (ref 70–110)
PHOSPHATE SERPL-MCNC: 2.6 MG/DL (ref 2.7–4.5)
POTASSIUM SERPL-SCNC: 4.7 MMOL/L (ref 3.5–5.1)
SODIUM SERPL-SCNC: 140 MMOL/L (ref 136–145)

## 2021-07-19 ENCOUNTER — PATIENT OUTREACH (OUTPATIENT)
Dept: ADMINISTRATIVE | Facility: HOSPITAL | Age: 71
End: 2021-07-19

## 2021-07-22 ENCOUNTER — TELEPHONE (OUTPATIENT)
Dept: FAMILY MEDICINE | Facility: CLINIC | Age: 71
End: 2021-07-22

## 2021-09-20 ENCOUNTER — PATIENT OUTREACH (OUTPATIENT)
Dept: ADMINISTRATIVE | Facility: HOSPITAL | Age: 71
End: 2021-09-20

## 2021-10-07 DIAGNOSIS — H91.90 HEARING DIFFICULTY, UNSPECIFIED LATERALITY: Primary | ICD-10-CM

## 2021-10-08 ENCOUNTER — PATIENT OUTREACH (OUTPATIENT)
Dept: ADMINISTRATIVE | Facility: OTHER | Age: 71
End: 2021-10-08

## 2021-10-11 ENCOUNTER — OFFICE VISIT (OUTPATIENT)
Dept: OTOLARYNGOLOGY | Facility: CLINIC | Age: 71
End: 2021-10-11
Payer: MEDICARE

## 2021-10-11 ENCOUNTER — CLINICAL SUPPORT (OUTPATIENT)
Dept: AUDIOLOGY | Facility: CLINIC | Age: 71
End: 2021-10-11
Payer: MEDICARE

## 2021-10-11 VITALS — BODY MASS INDEX: 26.05 KG/M2 | WEIGHT: 186.06 LBS | HEIGHT: 71 IN

## 2021-10-11 DIAGNOSIS — H93.13 TINNITUS, BILATERAL: Primary | ICD-10-CM

## 2021-10-11 DIAGNOSIS — H93.13 BILATERAL TINNITUS: ICD-10-CM

## 2021-10-11 DIAGNOSIS — H90.3 BILATERAL SENSORINEURAL HEARING LOSS: ICD-10-CM

## 2021-10-11 DIAGNOSIS — H91.90 HEARING DIFFICULTY, UNSPECIFIED LATERALITY: ICD-10-CM

## 2021-10-11 DIAGNOSIS — H90.3 BILATERAL SENSORINEURAL HEARING LOSS: Primary | ICD-10-CM

## 2021-10-11 DIAGNOSIS — Z77.122 HISTORY OF EXPOSURE TO NOISE: ICD-10-CM

## 2021-10-11 PROCEDURE — 92557 COMPREHENSIVE HEARING TEST: CPT | Mod: PBBFAC,PO | Performed by: AUDIOLOGIST

## 2021-10-11 PROCEDURE — 99211 OFF/OP EST MAY X REQ PHY/QHP: CPT | Mod: PBBFAC,PO

## 2021-10-11 PROCEDURE — 99203 OFFICE O/P NEW LOW 30 MIN: CPT | Mod: S$PBB,,, | Performed by: NURSE PRACTITIONER

## 2021-10-11 PROCEDURE — 99203 PR OFFICE/OUTPT VISIT, NEW, LEVL III, 30-44 MIN: ICD-10-PCS | Mod: S$PBB,,, | Performed by: NURSE PRACTITIONER

## 2021-10-11 PROCEDURE — 99999 PR PBB SHADOW E&M-EST. PATIENT-LVL III: ICD-10-PCS | Mod: PBBFAC,,, | Performed by: NURSE PRACTITIONER

## 2021-10-11 PROCEDURE — 92567 TYMPANOMETRY: CPT | Mod: PBBFAC,PO | Performed by: AUDIOLOGIST

## 2021-10-11 PROCEDURE — 99213 OFFICE O/P EST LOW 20 MIN: CPT | Mod: PBBFAC,25,PO | Performed by: NURSE PRACTITIONER

## 2021-10-11 PROCEDURE — 99999 PR PBB SHADOW E&M-EST. PATIENT-LVL III: CPT | Mod: PBBFAC,,, | Performed by: NURSE PRACTITIONER

## 2021-10-11 PROCEDURE — 99999 PR PBB SHADOW E&M-EST. PATIENT-LVL I: ICD-10-PCS | Mod: PBBFAC,,,

## 2021-10-11 PROCEDURE — 99999 PR PBB SHADOW E&M-EST. PATIENT-LVL I: CPT | Mod: PBBFAC,,,

## 2021-10-11 RX ORDER — OXYCODONE AND ACETAMINOPHEN 10; 325 MG/1; MG/1
TABLET ORAL
COMMUNITY
Start: 2021-01-20

## 2021-11-18 ENCOUNTER — PATIENT OUTREACH (OUTPATIENT)
Dept: ADMINISTRATIVE | Facility: OTHER | Age: 71
End: 2021-11-18
Payer: MEDICARE

## 2021-11-19 ENCOUNTER — PATIENT OUTREACH (OUTPATIENT)
Dept: ADMINISTRATIVE | Facility: HOSPITAL | Age: 71
End: 2021-11-19
Payer: MEDICARE

## 2021-12-07 ENCOUNTER — PATIENT MESSAGE (OUTPATIENT)
Dept: FAMILY MEDICINE | Facility: CLINIC | Age: 71
End: 2021-12-07
Payer: MEDICARE

## 2021-12-07 ENCOUNTER — PATIENT OUTREACH (OUTPATIENT)
Dept: ADMINISTRATIVE | Facility: HOSPITAL | Age: 71
End: 2021-12-07
Payer: MEDICARE

## 2021-12-07 ENCOUNTER — PATIENT MESSAGE (OUTPATIENT)
Dept: CARDIOLOGY | Facility: CLINIC | Age: 71
End: 2021-12-07
Payer: MEDICARE

## 2021-12-28 ENCOUNTER — PATIENT OUTREACH (OUTPATIENT)
Dept: ADMINISTRATIVE | Facility: OTHER | Age: 71
End: 2021-12-28
Payer: MEDICARE

## 2022-01-03 ENCOUNTER — TELEPHONE (OUTPATIENT)
Dept: FAMILY MEDICINE | Facility: CLINIC | Age: 72
End: 2022-01-03

## 2022-01-03 ENCOUNTER — CLINICAL SUPPORT (OUTPATIENT)
Dept: FAMILY MEDICINE | Facility: CLINIC | Age: 72
End: 2022-01-03
Payer: MEDICARE

## 2022-01-03 ENCOUNTER — PATIENT MESSAGE (OUTPATIENT)
Dept: ADMINISTRATIVE | Facility: OTHER | Age: 72
End: 2022-01-03
Payer: MEDICARE

## 2022-01-03 DIAGNOSIS — U07.1 COVID-19: Primary | ICD-10-CM

## 2022-01-03 DIAGNOSIS — U07.1 COVID-19: ICD-10-CM

## 2022-01-03 LAB
CTP QC/QA: YES
SARS-COV-2 RDRP RESP QL NAA+PROBE: NEGATIVE

## 2022-01-03 PROCEDURE — 87635 SARS-COV-2 COVID-19 AMP PRB: CPT | Mod: PBBFAC,PO

## 2022-01-03 NOTE — TELEPHONE ENCOUNTER
----- Message from Rachel Cobos sent at 1/3/2022  8:02 AM CST -----  .Type:  Patient Call Back    Who Called: pt       Does the patient know what this is regarding?: pt wants a covid test I told him about our testing sites he wants the office to order it he has a sore throat and some other symptoms of covid he stated     Would the patient rather a call back yes     Best Call Back Number: [phone]    Additional Information: Thank You

## 2022-01-03 NOTE — TELEPHONE ENCOUNTER
I have signed for the following orders AND/OR meds.  Please call the patient and ask the patient to schedule the testing AND/OR inform about any medications that were sent.      Orders Placed This Encounter   Procedures    POCT COVID-19 Rapid Screening     Standing Status:   Future     Standing Expiration Date:   3/4/2023     Order Specific Question:   Is the patient symptomatic?     Answer:   Yes

## 2022-01-04 ENCOUNTER — PATIENT MESSAGE (OUTPATIENT)
Dept: CARDIOLOGY | Facility: CLINIC | Age: 72
End: 2022-01-04
Payer: MEDICARE

## 2022-01-07 ENCOUNTER — OFFICE VISIT (OUTPATIENT)
Dept: CARDIOLOGY | Facility: CLINIC | Age: 72
End: 2022-01-07
Payer: MEDICARE

## 2022-01-07 VITALS
WEIGHT: 189.63 LBS | HEIGHT: 71 IN | SYSTOLIC BLOOD PRESSURE: 154 MMHG | HEART RATE: 68 BPM | BODY MASS INDEX: 26.55 KG/M2 | DIASTOLIC BLOOD PRESSURE: 91 MMHG

## 2022-01-07 DIAGNOSIS — Z95.5 STATUS POST CORONARY ARTERY STENT PLACEMENT: ICD-10-CM

## 2022-01-07 DIAGNOSIS — Z01.810 PREOP CARDIOVASCULAR EXAM: ICD-10-CM

## 2022-01-07 DIAGNOSIS — I25.10 CORONARY ARTERY DISEASE INVOLVING NATIVE CORONARY ARTERY OF NATIVE HEART WITHOUT ANGINA PECTORIS: ICD-10-CM

## 2022-01-07 DIAGNOSIS — E78.5 HYPERLIPIDEMIA WITH TARGET LDL LESS THAN 100: Primary | ICD-10-CM

## 2022-01-07 PROCEDURE — 99214 OFFICE O/P EST MOD 30 MIN: CPT | Mod: S$PBB,,, | Performed by: INTERNAL MEDICINE

## 2022-01-07 PROCEDURE — 99214 PR OFFICE/OUTPT VISIT, EST, LEVL IV, 30-39 MIN: ICD-10-PCS | Mod: S$PBB,,, | Performed by: INTERNAL MEDICINE

## 2022-01-07 PROCEDURE — 99213 OFFICE O/P EST LOW 20 MIN: CPT | Mod: PBBFAC,PO | Performed by: INTERNAL MEDICINE

## 2022-01-07 PROCEDURE — 99999 PR PBB SHADOW E&M-EST. PATIENT-LVL III: ICD-10-PCS | Mod: PBBFAC,,, | Performed by: INTERNAL MEDICINE

## 2022-01-07 PROCEDURE — 99999 PR PBB SHADOW E&M-EST. PATIENT-LVL III: CPT | Mod: PBBFAC,,, | Performed by: INTERNAL MEDICINE

## 2022-01-07 NOTE — PROGRESS NOTES
Subjective:    Patient ID:  Luigi Murillo is a 71 y.o. male who presents for follow-up of Cardiac clearance (Total Rt knee replacement; Dr. Galvin 01/19/22)      HPI  He comes for follow up with no major problems, no chest pain, no shortness of breath.  Having right knee surgery next week  FC I    Review of Systems   Constitutional: Negative for decreased appetite, malaise/fatigue, weight gain and weight loss.   Cardiovascular: Negative for chest pain, dyspnea on exertion, leg swelling, palpitations and syncope.   Respiratory: Negative for cough and shortness of breath.    Gastrointestinal: Negative.    Neurological: Negative for weakness.   All other systems reviewed and are negative.       Objective:      Physical Exam  Vitals and nursing note reviewed.   Constitutional:       Appearance: Normal appearance. He is well-developed and well-nourished.   HENT:      Head: Normocephalic.   Eyes:      Pupils: Pupils are equal, round, and reactive to light.   Neck:      Thyroid: No thyromegaly.      Vascular: No carotid bruit or JVD.   Cardiovascular:      Rate and Rhythm: Normal rate and regular rhythm.      Chest Wall: PMI is not displaced.      Pulses: Normal pulses and intact distal pulses.      Heart sounds: Normal heart sounds. No murmur heard.  No gallop.    Pulmonary:      Effort: Pulmonary effort is normal.      Breath sounds: Normal breath sounds.   Abdominal:      Palpations: Abdomen is soft. There is no hepatosplenomegaly or mass.      Tenderness: There is no abdominal tenderness.   Musculoskeletal:         General: No edema. Normal range of motion.      Cervical back: Normal range of motion and neck supple.   Skin:     General: Skin is warm and intact.   Neurological:      Mental Status: He is alert and oriented to person, place, and time.      Sensory: No sensory deficit.      Deep Tendon Reflexes: Strength normal and reflexes are normal and symmetric.   Psychiatric:         Mood and Affect: Mood and  affect normal.           Assessment:       1. Hyperlipidemia with target LDL less than 100    2. Preop cardiovascular exam    3. Status post coronary artery stent placement    4. Coronary artery disease involving native coronary artery of native heart without angina pectoris         Plan:     Continue all cardiac medications  Regular exercise program  Weight loss  No contraindication for surgery/anesthesia from cardiac standpoint  1 yr f/u

## 2022-01-11 ENCOUNTER — PATIENT OUTREACH (OUTPATIENT)
Dept: ADMINISTRATIVE | Facility: HOSPITAL | Age: 72
End: 2022-01-11
Payer: MEDICARE

## 2022-01-11 ENCOUNTER — PATIENT MESSAGE (OUTPATIENT)
Dept: ADMINISTRATIVE | Facility: HOSPITAL | Age: 72
End: 2022-01-11
Payer: MEDICARE

## 2022-01-11 NOTE — PROGRESS NOTES
Blood Pressure Report: Called Pt to obtain home blood pressure or schedule Nurse Visit. Pt says he does not have a blood pressure machine & declines to come in for Nurse Visit to have a blood pressure check. He informs he will be having knee surgery soon & uninterested in BP check & unsure when we will be back up after surgery.

## 2022-02-07 DIAGNOSIS — I25.10 CAD IN NATIVE ARTERY: ICD-10-CM

## 2022-02-07 RX ORDER — LISINOPRIL 5 MG/1
TABLET ORAL
Qty: 90 TABLET | Refills: 0 | Status: SHIPPED | OUTPATIENT
Start: 2022-02-07 | End: 2022-04-27 | Stop reason: SDUPTHER

## 2022-02-10 DIAGNOSIS — I10 HYPERTENSION: ICD-10-CM

## 2022-02-18 ENCOUNTER — PATIENT OUTREACH (OUTPATIENT)
Dept: ADMINISTRATIVE | Facility: HOSPITAL | Age: 72
End: 2022-02-18
Payer: MEDICARE

## 2022-02-18 NOTE — PROGRESS NOTES
Called patient to follow up on blood pressure, Pt says I'm not trying to be a jerk but I take my medications as Dr. Stafford prescribed & I do not want to be called about a blood pressure and do not want to come in for a blood pressure check. Chart cc'd to PCP staff.

## 2022-03-13 ENCOUNTER — PATIENT MESSAGE (OUTPATIENT)
Dept: FAMILY MEDICINE | Facility: CLINIC | Age: 72
End: 2022-03-13
Payer: MEDICARE

## 2022-03-29 DIAGNOSIS — N40.1 BENIGN PROSTATIC HYPERPLASIA (BPH) WITH STRAINING ON URINATION: ICD-10-CM

## 2022-03-29 DIAGNOSIS — R39.16 BENIGN PROSTATIC HYPERPLASIA (BPH) WITH STRAINING ON URINATION: ICD-10-CM

## 2022-03-29 NOTE — TELEPHONE ENCOUNTER
Care Due:                  Date            Visit Type   Department     Provider  --------------------------------------------------------------------------------                                MYCHART                              FOLLOWUP/OF  [facility]  Last Visit: 12-      FICE VISIT   DAMIAN Page  Next Visit: None Scheduled  None         None Found                                                            Last  Test          Frequency    Reason                     Performed    Due Date  --------------------------------------------------------------------------------    Office Visit  12 months..  atorvastatin, buPROPion,   12- 12-                             lisinopriL, tamsulosin...    CMP.........  12 months..  atorvastatin, lisinopriL.  10-   10-    Lipid Panel.  12 months..  atorvastatin.............  09- 09-    Powered by [employer]. Reference number: [id number].   3/29/2022 2:41:41 AM CDT

## 2022-03-30 ENCOUNTER — PATIENT MESSAGE (OUTPATIENT)
Dept: FAMILY MEDICINE | Facility: CLINIC | Age: 72
End: 2022-03-30
Payer: MEDICARE

## 2022-03-30 RX ORDER — TAMSULOSIN HYDROCHLORIDE 0.4 MG/1
CAPSULE ORAL
Qty: 180 CAPSULE | Refills: 0 | Status: SHIPPED | OUTPATIENT
Start: 2022-03-30 | End: 2022-06-28

## 2022-03-30 NOTE — TELEPHONE ENCOUNTER
This Rx Request does not qualify for assessment with the OR   Please review protocol details and the Care Due Message for extra clinical information    Reasons Rx Request may be deferred:  Pt due for OV with PCP    Note composed:5:49 AM 03/30/2022

## 2022-04-27 ENCOUNTER — PATIENT MESSAGE (OUTPATIENT)
Dept: ADMINISTRATIVE | Facility: HOSPITAL | Age: 72
End: 2022-04-27
Payer: MEDICARE

## 2022-04-27 DIAGNOSIS — I25.10 CAD IN NATIVE ARTERY: ICD-10-CM

## 2022-04-27 RX ORDER — LISINOPRIL 5 MG/1
TABLET ORAL
Qty: 90 TABLET | Refills: 3 | Status: SHIPPED | OUTPATIENT
Start: 2022-04-27 | End: 2022-08-24

## 2022-04-27 NOTE — TELEPHONE ENCOUNTER
No new care gaps identified.  Powered by [employer]. Reference number: [id number].   4/27/2022 2:42:41 AM CDT

## 2022-04-27 NOTE — TELEPHONE ENCOUNTER
Refill Routing Note   Medication(s) are not appropriate for processing by [facility] for the following reason(s):      - Patient has not been seen in over 15 months by PCP    [facility] action(s):  Defer Medication-related problems identified:   Requires appointment  Requires labs        Medication reconciliation completed: No     Appointments  past 12m or future 3m with PCP    Date Provider   Last Visit   10/27/2020 Iveth Stafford MD   Next Visit   Visit date not found Iveth Stafford MD   ED visits in past 90 days: 0        Note composed:12:33 PM 04/27/2022

## 2022-05-05 DIAGNOSIS — F32.5 DEPRESSION, MAJOR, IN REMISSION: ICD-10-CM

## 2022-05-05 DIAGNOSIS — I25.10 CAD IN NATIVE ARTERY: ICD-10-CM

## 2022-05-05 RX ORDER — BUPROPION HYDROCHLORIDE 300 MG/1
TABLET ORAL
Qty: 90 TABLET | Refills: 0 | Status: SHIPPED | OUTPATIENT
Start: 2022-05-05 | End: 2022-07-20

## 2022-05-05 RX ORDER — METOPROLOL SUCCINATE 25 MG/1
TABLET, EXTENDED RELEASE ORAL
Qty: 45 TABLET | Refills: 0 | Status: SHIPPED | OUTPATIENT
Start: 2022-05-05 | End: 2022-08-03

## 2022-05-05 RX ORDER — RANOLAZINE 500 MG/1
TABLET, EXTENDED RELEASE ORAL
Qty: 90 TABLET | Refills: 0 | Status: SHIPPED | OUTPATIENT
Start: 2022-05-05 | End: 2022-08-24

## 2022-05-05 NOTE — TELEPHONE ENCOUNTER
Refill Routing Note   Medication(s) are not appropriate for processing by [facility] for the following reason(s):      - Outside of protocol  - Patient has not been seen in over 15 months by PCP  - Required vitals are abnormal    [facility] action(s):  Defer  Route Medication-related problems identified: Requires appointment     Medication Therapy Plan: Needs an OV with PCP, lov 10/27/2020, DEFER metoprolol and bupropion (last BP 01/07/22 (!) 154/91), ROUTE ranolazine (OOP)  Medication reconciliation completed: No     Appointments  past 12m or future 3m with PCP    Date Provider   Last Visit   10/27/2020 Iveth Stafford MD   Next Visit   Visit date not found Iveth Stafford MD   ED visits in past 90 days: 0        Note composed:11:24 AM 05/05/2022

## 2022-05-05 NOTE — TELEPHONE ENCOUNTER
No new care gaps identified.  [facility] Embedded Care Gaps. Reference number: [id number]. 5/05/2022   2:44:31 AM CDT

## 2022-05-25 ENCOUNTER — PATIENT OUTREACH (OUTPATIENT)
Dept: ADMINISTRATIVE | Facility: HOSPITAL | Age: 72
End: 2022-05-25
Payer: MEDICARE

## 2022-05-25 DIAGNOSIS — I25.10 CAD IN NATIVE ARTERY: ICD-10-CM

## 2022-05-25 RX ORDER — ATORVASTATIN CALCIUM 40 MG/1
TABLET, FILM COATED ORAL
Qty: 90 TABLET | Refills: 0 | Status: SHIPPED | OUTPATIENT
Start: 2022-05-25 | End: 2022-08-23

## 2022-05-25 NOTE — TELEPHONE ENCOUNTER
Refill Routing Note   Medication(s) are not appropriate for processing by [facility] for the following reason(s):      - Patient has not been seen in over 15 months by PCP  - Required laboratory values are outdated    [facility] action(s):  Defer          Medication reconciliation completed: No     Appointments  past 12m or future 3m with PCP    Date Provider   Last Visit   10/27/2020 Iveth Stafford MD   Next Visit   Visit date not found Iveth Stafford MD   ED visits in past 90 days: 0        Note composed:7:15 AM 05/25/2022

## 2022-05-25 NOTE — TELEPHONE ENCOUNTER
No new care gaps identified.  [facility] Embedded Care Gaps. Reference number: [id number]. 5/25/2022   2:33:04 AM CDT

## 2022-05-25 NOTE — PROGRESS NOTES
HTN REPORT: I spoke with pt's wife that stated she will have him to call me back to schedule an appt when he comes back in.

## 2022-05-27 ENCOUNTER — PATIENT MESSAGE (OUTPATIENT)
Dept: FAMILY MEDICINE | Facility: CLINIC | Age: 72
End: 2022-05-27
Payer: MEDICARE

## 2022-06-27 DIAGNOSIS — R39.16 BENIGN PROSTATIC HYPERPLASIA (BPH) WITH STRAINING ON URINATION: ICD-10-CM

## 2022-06-27 DIAGNOSIS — N40.1 BENIGN PROSTATIC HYPERPLASIA (BPH) WITH STRAINING ON URINATION: ICD-10-CM

## 2022-06-27 NOTE — TELEPHONE ENCOUNTER
Care Due:                  Date            Visit Type   Department     Provider  --------------------------------------------------------------------------------                                MYCHART                              FOLLOWUP/OF  [facility]  Last Visit: 12-      [facility] VISIT   [facility]       [facility]                              ANNU[facility]                              CHECKUP/PHY  [facility]  Next Visit: 07-      [facility]       Iveth Stafford                                                            Last  Test          Frequency    Reason                     Performed    Due Date  --------------------------------------------------------------------------------    CMP.........  12 months..  atorvastatin, lisinopriL.  10-   10-    Lipid Panel.  12 months..  atorvastatin.............  09- 09-    [employer] Embedded Care Gaps. Reference number: [id number]. 6/27/2022   2:48:32 AM CDT

## 2022-06-28 RX ORDER — TAMSULOSIN HYDROCHLORIDE 0.4 MG/1
CAPSULE ORAL
Qty: 180 CAPSULE | Refills: 0 | Status: SHIPPED | OUTPATIENT
Start: 2022-06-28 | End: 2022-09-26

## 2022-07-20 ENCOUNTER — PATIENT MESSAGE (OUTPATIENT)
Dept: FAMILY MEDICINE | Facility: CLINIC | Age: 72
End: 2022-07-20

## 2022-07-20 ENCOUNTER — OFFICE VISIT (OUTPATIENT)
Dept: FAMILY MEDICINE | Facility: CLINIC | Age: 72
End: 2022-07-20
Payer: MEDICARE

## 2022-07-20 VITALS
TEMPERATURE: 98 F | WEIGHT: 193 LBS | BODY MASS INDEX: 27.02 KG/M2 | HEART RATE: 73 BPM | DIASTOLIC BLOOD PRESSURE: 72 MMHG | HEIGHT: 71 IN | SYSTOLIC BLOOD PRESSURE: 117 MMHG

## 2022-07-20 DIAGNOSIS — F32.5 DEPRESSION, MAJOR, IN REMISSION: ICD-10-CM

## 2022-07-20 DIAGNOSIS — I10 PRIMARY HYPERTENSION: Primary | ICD-10-CM

## 2022-07-20 DIAGNOSIS — R49.0 HOARSENESS: ICD-10-CM

## 2022-07-20 DIAGNOSIS — E78.5 HYPERLIPIDEMIA WITH TARGET LDL LESS THAN 100: ICD-10-CM

## 2022-07-20 DIAGNOSIS — Z79.899 ENCOUNTER FOR LONG-TERM (CURRENT) USE OF HIGH-RISK MEDICATION: ICD-10-CM

## 2022-07-20 DIAGNOSIS — I25.10 CORONARY ARTERY DISEASE INVOLVING NATIVE CORONARY ARTERY OF NATIVE HEART WITHOUT ANGINA PECTORIS: ICD-10-CM

## 2022-07-20 DIAGNOSIS — F41.1 GAD (GENERALIZED ANXIETY DISORDER): ICD-10-CM

## 2022-07-20 DIAGNOSIS — Z23 NEED FOR PNEUMOCOCCAL VACCINE: ICD-10-CM

## 2022-07-20 PROBLEM — Z01.810 PREOP CARDIOVASCULAR EXAM: Status: RESOLVED | Noted: 2017-08-25 | Resolved: 2022-07-20

## 2022-07-20 PROBLEM — M65.341 TRIGGER FINGER, RIGHT RING FINGER: Chronic | Status: RESOLVED | Noted: 2017-07-17 | Resolved: 2022-07-20

## 2022-07-20 PROCEDURE — 99214 OFFICE O/P EST MOD 30 MIN: CPT | Mod: S$PBB,,, | Performed by: INTERNAL MEDICINE

## 2022-07-20 PROCEDURE — 99999 PR PBB SHADOW E&M-EST. PATIENT-LVL IV: ICD-10-PCS | Mod: PBBFAC,,, | Performed by: INTERNAL MEDICINE

## 2022-07-20 PROCEDURE — 90677 PCV20 VACCINE IM: CPT | Mod: PBBFAC,PO

## 2022-07-20 PROCEDURE — 99214 PR OFFICE/OUTPT VISIT, EST, LEVL IV, 30-39 MIN: ICD-10-PCS | Mod: S$PBB,,, | Performed by: INTERNAL MEDICINE

## 2022-07-20 PROCEDURE — 99214 OFFICE O/P EST MOD 30 MIN: CPT | Mod: PBBFAC,PO,25 | Performed by: INTERNAL MEDICINE

## 2022-07-20 PROCEDURE — 99999 PR PBB SHADOW E&M-EST. PATIENT-LVL IV: CPT | Mod: PBBFAC,,, | Performed by: INTERNAL MEDICINE

## 2022-07-20 RX ORDER — BUPROPION HYDROCHLORIDE 150 MG/1
TABLET ORAL
Qty: 14 TABLET | Refills: 0 | Status: SHIPPED | OUTPATIENT
Start: 2022-07-20 | End: 2022-08-01

## 2022-07-20 RX ORDER — DULOXETIN HYDROCHLORIDE 30 MG/1
30 CAPSULE, DELAYED RELEASE ORAL DAILY
Qty: 30 CAPSULE | Refills: 1 | Status: SHIPPED | OUTPATIENT
Start: 2022-07-20 | End: 2022-07-20 | Stop reason: SDUPTHER

## 2022-07-20 RX ORDER — BUPROPION HYDROCHLORIDE 150 MG/1
TABLET ORAL
Qty: 14 TABLET | Refills: 0 | Status: SHIPPED | OUTPATIENT
Start: 2022-07-20 | End: 2022-07-20 | Stop reason: SDUPTHER

## 2022-07-20 RX ORDER — DULOXETIN HYDROCHLORIDE 30 MG/1
30 CAPSULE, DELAYED RELEASE ORAL DAILY
Qty: 30 CAPSULE | Refills: 1 | Status: SHIPPED | OUTPATIENT
Start: 2022-07-20 | End: 2022-08-24

## 2022-07-20 NOTE — TELEPHONE ENCOUNTER
No new care gaps identified.  [facility] Embedded Care Gaps. Reference number: [id number]. 7/20/2022   3:44:31 PM CDT

## 2022-07-20 NOTE — PROGRESS NOTES
Assessment/Plan:    Problem List Items Addressed This Visit        Psychiatric    Depression, major, in remission    Overview     -worsening symptoms while on wellbutrin  -plan to change to cymbalta 30 mg QD given hx of chronic pain  -discussed risk of discontinuing this medication without tapering  -patient was educated, advised of side effects, and all questions were answered.  Patient voiced understanding  -patient will follow up routinely and notify us if having any side effects or worsening or persistent symptoms.  ER precautions were given.           Relevant Medications    DULoxetine (CYMBALTA) 30 MG capsule    buPROPion (WELLBUTRIN XL) 150 MG TB24 tablet       Cardiac/Vascular    CAD (coronary artery disease)    Overview     -S/p PCI x 2  -also with hx of stable angina, on ranexa  -followed by Dr. Barros  -on ASA/statin and metoprolol           Hyperlipidemia with target LDL less than 100    Overview     Hyperlipidemia Medications             atorvastatin (LIPITOR) 40 MG tablet TAKE 1 TABLET DAILY      -chronic condition. Currently stable.    -reports compliance with hyperlipidemia treatment as prescribed  -denies any known adverse effects of medications  -most recent labs listed below; due for repeat labs  Lab Results   Component Value Date    CHOL 143 09/12/2020     Lab Results   Component Value Date    HDL 58 09/12/2020     Lab Results   Component Value Date    LDLCALC 69 09/12/2020     Lab Results   Component Value Date    TRIG 82 09/12/2020     Lab Results   Component Value Date    ALT 18 10/26/2020    AST 22 10/26/2020    ALKPHOS 89 10/26/2020    BILITOT 0.6 10/26/2020                Relevant Orders    Lipid Panel    Hypertension - Primary    Overview     Hypertension Medications             lisinopriL (PRINIVIL,ZESTRIL) 5 MG tablet TAKE 1 TABLET DAILY    metoprolol succinate (TOPROL-XL) 25 MG 24 hr tablet TAKE ONE-HALF (1/2) TABLET DAILY      -at goal today  -continue lifestyle modification with  low sodium diet and exercise   -discussed hypertension disease course and importance of treating high blood pressure  -patient understood and advised of risk of untreated blood pressure.  ER precautions were given   for symptoms of hypertensive urgency and emergency.             Other Visit Diagnoses     MEGAN (generalized anxiety disorder)        Relevant Medications    DULoxetine (CYMBALTA) 30 MG capsule    buPROPion (WELLBUTRIN XL) 150 MG TB24 tablet    Hoarseness      -worsening hoarseness over the past year  -long hx of tobacco use but no longer smoking    Relevant Orders    Ambulatory referral/consult to ENT    Encounter for long-term (current) use of high-risk medication        Relevant Orders    CBC Without Differential    Comprehensive Metabolic Panel    Lipid Panel    Need for pneumococcal vaccine        Relevant Orders    (In Office Administered) Pneumococcal Conjugate Vaccine (20 Valent) (IM) (Completed)          Follow up in about 4 weeks (around 8/17/2022) for F/U with me; labs tomorrow: cbc,cmp,lipid; ENT appt.    Iveth Stafford MD  _____________________________________________________________________________________________________________________________________________________    CC: follow up of chronic medical conditions     HPI:    Patient is in clinic today as an established patient here for follow up of chronic medical conditions.    HTN: The patient is currently being treated for essential hypertension. This condition is chronic and stable. The patient is tolerating their medication well with good compliance.  Denies any adverse effects of medications.  Counseling was offered regarding low sodium diet.  The patient has a reduced salt intake. Routine exercise recommended. The patient denies headache, vision changes, chest pain, palpitations, shortness of breath, or lower extremity edema.     Anxiety: Patient complains of anxiety disorder and depression  He has the following symptoms: feelings  of losing control, racing thoughts. Patient has a chronic hx of anxiety and depression but recent worsening of symptoms over the past few months. Mostly surrounding his wife's chronic illnesses.  He denies current suicidal and homicidal ideation. Previous treatment includes Wellbutrin, which he has been on for quite some time. He found the wellbutrin previously effective but does not feel that it is as helpful.      Hoarseness: complaint of hoarseness of voice. Worsening over the past few months. His daughter noticed and wanted him to have it checked out. Reports occasional sore throat but not often. Denies allergy issues, including post-nasal drip. He was a chronic smoker but quit smoking about 15 years ago.     No other new complaints today.  Remaining chronic conditions have been reviewed and remain stable. Further detail as stated above.       reviewed. Due for annual labs. Pneumonia vaccine today.    Past Medical History:  Past Medical History:   Diagnosis Date    BPH (benign prostatic hypertrophy) with urinary obstruction     CAD (coronary artery disease) May and Yarely 2011    x2    Chronic pain     DDD (degenerative disc disease), lumbar     Depression, major, in remission     Hyperlipidemia LDL goal < 100     Hypertension     MRSA cellulitis     Osteoarthritis     Trigger finger, right ring finger 7/17/2017     Past Surgical History:   Procedure Laterality Date    COLONOSCOPY N/A 12/17/2019    Procedure: COLONOSCOPY;  Surgeon: Diann Negrete MD;  [facility];  Service: Endoscopy;  Laterality: N/A;    CORONARY ANGIOPLASTY      2 Stents placed a few year ago    Epidural steroid injection      Pain management    INJURY      JOINT REPLACEMENT  Jan 18, 2022    KNEE SURGERY      Right, x2    MANDIBLE FRACTURE SURGERY      accident during  service    REPAIR OF LIGAMENT OF SHOULDER Right     ROTATOR CUFF REPAIR      right times 2    VASECTOMY       Review of patient's allergies  indicates:   Allergen Reactions    Acetaminophen-codeine Rash     #3     Social History     Tobacco Use    Smoking status: Former Smoker     Packs/day: 1.00     Years: 25.00     Pack years: 25.00     Quit date: 5/11/2007     Years since quitting: 15.2    Smokeless tobacco: Never Used   Substance Use Topics    Alcohol use: No    Drug use: Never     Family History   Problem Relation Age of Onset    Heart disease Father     Vision loss Father     Emphysema Mother     Cancer Mother         unknown    Heart failure Mother     Arthritis Mother     COPD Mother     Heart disease Mother     Prostate cancer Brother     Diabetes Paternal Aunt      Current Outpatient Medications on File Prior to Visit   Medication Sig Dispense Refill    aspirin (ECOTRIN) 81 MG EC tablet Take 81 mg by mouth once daily.      atorvastatin (LIPITOR) 40 MG tablet TAKE 1 TABLET DAILY 90 tablet 0    cyclobenzaprine (FLEXERIL) 10 MG tablet TAKE 1 TABLET NIGHTLY AS NEEDED FOR MUSCLE SPASMS 90 tablet 3    HYDROcodone-acetaminophen (NORCO) 7.5-325 mg per tablet Take 1 tablet by mouth every 6 (six) hours as needed for Pain. 28 tablet 0    lisinopriL (PRINIVIL,ZESTRIL) 5 MG tablet TAKE 1 TABLET DAILY 90 tablet 3    metoprolol succinate (TOPROL-XL) 25 MG 24 hr tablet TAKE ONE-HALF (1/2) TABLET DAILY 45 tablet 0    morphine (MS CONTIN) 30 MG 12 hr tablet Take 30 mg by mouth 3 (three) times daily.      oxyCODONE-acetaminophen (PERCOCET)  mg per tablet       ranolazine (RANEXA) 500 MG Tb12 TAKE 1 TABLET NIGHTLY 90 tablet 0    tamsulosin (FLOMAX) 0.4 mg Cap TAKE 2 CAPSULES EVERY EVENING 180 capsule 0    [DISCONTINUED] buPROPion (WELLBUTRIN XL) 300 MG 24 hr tablet TAKE 1 TABLET DAILY 90 tablet 0     No current facility-administered medications on file prior to visit.       Review of Systems   Constitutional: Positive for activity change. Negative for unexpected weight change.   HENT: Positive for hearing loss. Negative for  "rhinorrhea and trouble swallowing.    Eyes: Positive for visual disturbance. Negative for discharge.   Respiratory: Negative for chest tightness and wheezing.    Cardiovascular: Negative for chest pain and palpitations.   Gastrointestinal: Negative for blood in stool, constipation, diarrhea and vomiting.   Endocrine: Negative for polydipsia and polyuria.   Genitourinary: Negative for difficulty urinating, hematuria and urgency.   Musculoskeletal: Positive for arthralgias and joint swelling. Negative for neck pain.   Neurological: Negative for weakness and headaches.   Psychiatric/Behavioral: Positive for dysphoric mood. Negative for confusion.       Vitals:    07/20/22 0748   BP: 117/72   Pulse: 73   Temp: 98.2 °F (36.8 °C)   Weight: 87.5 kg (193 lb)   Height: 5' 11" (1.803 m)       Wt Readings from Last 3 Encounters:   07/20/22 87.5 kg (193 lb)   01/07/22 86 kg (189 lb 9.5 oz)   10/11/21 84.4 kg (186 lb 1.1 oz)       Physical Exam  Constitutional:       General: He is not in acute distress.     Appearance: Normal appearance. He is well-developed.   HENT:      Head: Normocephalic and atraumatic.   Eyes:      Conjunctiva/sclera: Conjunctivae normal.   Cardiovascular:      Rate and Rhythm: Normal rate and regular rhythm.      Pulses: Normal pulses.      Heart sounds: Normal heart sounds. No murmur heard.  Pulmonary:      Effort: Pulmonary effort is normal. No respiratory distress.      Breath sounds: Normal breath sounds.   Abdominal:      General: Bowel sounds are normal. There is no distension.      Palpations: Abdomen is soft.      Tenderness: There is no abdominal tenderness.   Musculoskeletal:         General: Normal range of motion.      Cervical back: Normal range of motion and neck supple.   Skin:     General: Skin is warm and dry.      Findings: No rash.   Neurological:      General: No focal deficit present.      Mental Status: He is alert and oriented to person, place, and time.   Psychiatric:         Mood " and Affect: Mood normal.         Behavior: Behavior normal.         Health Maintenance   Topic Date Due    Lipid Panel  09/12/2021    High Dose Statin  07/20/2023    Aspirin/Antiplatelet Therapy  07/20/2023    TETANUS VACCINE  10/15/2028    Hepatitis C Screening  Completed    Abdominal Aortic Aneurysm Screening  Completed

## 2022-08-01 ENCOUNTER — OFFICE VISIT (OUTPATIENT)
Dept: FAMILY MEDICINE | Facility: CLINIC | Age: 72
End: 2022-08-01
Payer: MEDICARE

## 2022-08-01 ENCOUNTER — TELEPHONE (OUTPATIENT)
Dept: FAMILY MEDICINE | Facility: CLINIC | Age: 72
End: 2022-08-01
Payer: MEDICARE

## 2022-08-01 DIAGNOSIS — F32.5 DEPRESSION, MAJOR, IN REMISSION: Primary | ICD-10-CM

## 2022-08-01 PROCEDURE — 99442 PR PHYSICIAN TELEPHONE EVALUATION 11-20 MIN: ICD-10-PCS | Mod: 95,,, | Performed by: PHYSICIAN ASSISTANT

## 2022-08-01 PROCEDURE — 99442 PR PHYSICIAN TELEPHONE EVALUATION 11-20 MIN: CPT | Mod: 95,,, | Performed by: PHYSICIAN ASSISTANT

## 2022-08-01 RX ORDER — BUPROPION HYDROCHLORIDE 300 MG/1
300 TABLET ORAL DAILY
Qty: 90 TABLET | Refills: 3 | Status: SHIPPED | OUTPATIENT
Start: 2022-08-01 | End: 2023-01-31

## 2022-08-01 NOTE — PROGRESS NOTES
Established Patient - Audio Only Telehealth Visit     The patient location is: Home  The chief complaint leading to consultation is: nausea, vomiting, and diarrhea  Visit type: Virtual visit with audio only (telephone)  Total time spent with patient: 15 minutes    The reason for the audio only service rather than synchronous audio and video virtual visit was related to technical difficulties or patient preference/necessity.     Each patient to whom I provide medical services by telemedicine is: (1) informed of the relationship between the physician and patient and the respective role of any other health care provider with respect to management of the patient; and (2) notified that they may decline to receive medical services by telemedicine and may withdraw from such care at any time. Patient verbally consented to receive this service via voice-only telephone call.       HPI: 71 year old present via audio visit for evaluation of nausea, vomiting, and diarrhea. Symptom onset was 3 days ago. Patient stated that he is in the process of weaning off of Wellbutrin and starting Cymbalta due to worsening of anxiety and depression. He was previously on Wellbutrin  mg QD and is now taking Wellbutrin  mg every other day. Patient stated that he has been on Wellbutrin for >20 years and suspects that symptoms are due to weaning off the of medication. He denies fever, chills, abdominal pain, or bloody/black stools. Denies sick contacts. Patient is requesting to resume the medication to see if symptoms improve. No other complaints today.        Assessment and plan:     1. Depression, major, in remission  Overview:  -new onset nausea, vomiting, and diarrhea since weaning off of Wellbutrin  -plan to resume Wellbutrin  mg QD as previously prescribed  -start Cymbalta 30 mg QD as discussed at last visit due to worsening of anxiety and depression and hx of chronic pain  -discussed risk of discontinuing this medication  without tapering  -patient was educated, advised of side effects, and all questions were answered.  Patient voiced understanding  -patient will follow up routinely and notify us if having any side effects or worsening or persistent symptoms. ER precautions were given.    Orders:  -     buPROPion (WELLBUTRIN XL) 300 MG 24 hr tablet      This service was not originating from a related E/M service provided within the previous 7 days nor will  to an E/M service or procedure within the next 24 hours or my soonest available appointment.  Prevailing standard of care was able to be met in this audio-only visit.

## 2022-08-01 NOTE — TELEPHONE ENCOUNTER
----- Message from Zafar Borrego sent at 8/1/2022  7:24 AM CDT -----  Contact: Amarilis Olmos (wife) would like to consult with nurse regarding patient vomitting and medication (Wellbutrin).  Please contact Amarilis @ [phone].  Thanks/As

## 2022-08-01 NOTE — TELEPHONE ENCOUNTER
Spoke with patient's wife. She reports that pt is in the process of weaning off wellbutrin 150 mg. He has been taking the wellbutrin every other day for about 4 days now. She reports the patient has been lethargic over the weekend and she believes it is from weaning off med. He began with vomitting, & diarrhea yesterday.     Patient is unable to complete in visit or virtual visit. Audio visit scheduled with PA today at 1:00 pm.

## 2022-08-03 DIAGNOSIS — I25.10 CAD IN NATIVE ARTERY: ICD-10-CM

## 2022-08-03 RX ORDER — METOPROLOL SUCCINATE 25 MG/1
TABLET, EXTENDED RELEASE ORAL
Qty: 45 TABLET | Refills: 3 | Status: SHIPPED | OUTPATIENT
Start: 2022-08-03 | End: 2023-09-20

## 2022-08-03 NOTE — TELEPHONE ENCOUNTER
No new care gaps identified.  [facility] Embedded Care Gaps. Reference number: [id number]. 8/03/2022   2:43:40 AM CDT

## 2022-08-03 NOTE — TELEPHONE ENCOUNTER
Refill Decision Note   Luigi Chidi  is requesting a refill authorization.  Brief Assessment and Rationale for Refill:  Approve     Medication Therapy Plan:  FOV;    Medication Reconciliation Completed: No   Comments:     No Care Gaps recommended.     Note composed:10:42 AM 08/03/2022

## 2022-08-09 ENCOUNTER — LAB VISIT (OUTPATIENT)
Dept: LAB | Facility: HOSPITAL | Age: 72
End: 2022-08-09
Attending: INTERNAL MEDICINE
Payer: MEDICARE

## 2022-08-09 DIAGNOSIS — I10 HYPERTENSION: ICD-10-CM

## 2022-08-09 DIAGNOSIS — E78.5 HYPERLIPIDEMIA WITH TARGET LDL LESS THAN 100: ICD-10-CM

## 2022-08-09 DIAGNOSIS — Z79.899 ENCOUNTER FOR LONG-TERM (CURRENT) USE OF HIGH-RISK MEDICATION: ICD-10-CM

## 2022-08-09 LAB
ALBUMIN SERPL BCP-MCNC: 3.7 G/DL (ref 3.5–5.2)
ALBUMIN SERPL BCP-MCNC: 3.7 G/DL (ref 3.5–5.2)
ALP SERPL-CCNC: 70 U/L (ref 55–135)
ALP SERPL-CCNC: 70 U/L (ref 55–135)
ALT SERPL W/O P-5'-P-CCNC: 22 U/L (ref 10–44)
ALT SERPL W/O P-5'-P-CCNC: 22 U/L (ref 10–44)
ANION GAP SERPL CALC-SCNC: 8 MMOL/L (ref 8–16)
ANION GAP SERPL CALC-SCNC: 8 MMOL/L (ref 8–16)
AST SERPL-CCNC: 19 U/L (ref 10–40)
AST SERPL-CCNC: 19 U/L (ref 10–40)
BILIRUB SERPL-MCNC: 0.3 MG/DL (ref 0.1–1)
BILIRUB SERPL-MCNC: 0.3 MG/DL (ref 0.1–1)
BUN SERPL-MCNC: 17 MG/DL (ref 8–23)
BUN SERPL-MCNC: 17 MG/DL (ref 8–23)
CALCIUM SERPL-MCNC: 8.5 MG/DL (ref 8.7–10.5)
CALCIUM SERPL-MCNC: 8.5 MG/DL (ref 8.7–10.5)
CHLORIDE SERPL-SCNC: 105 MMOL/L (ref 95–110)
CHLORIDE SERPL-SCNC: 105 MMOL/L (ref 95–110)
CHOLEST SERPL-MCNC: 149 MG/DL (ref 120–199)
CHOLEST/HDLC SERPL: 3.3 {RATIO} (ref 2–5)
CO2 SERPL-SCNC: 25 MMOL/L (ref 23–29)
CO2 SERPL-SCNC: 25 MMOL/L (ref 23–29)
CREAT SERPL-MCNC: 1.4 MG/DL (ref 0.5–1.4)
CREAT SERPL-MCNC: 1.4 MG/DL (ref 0.5–1.4)
ERYTHROCYTE [DISTWIDTH] IN BLOOD BY AUTOMATED COUNT: 12.8 % (ref 11.5–14.5)
EST. GFR  (NO RACE VARIABLE): 54.4 ML/MIN/1.73 M^2
EST. GFR  (NO RACE VARIABLE): 54.4 ML/MIN/1.73 M^2
GLUCOSE SERPL-MCNC: 91 MG/DL (ref 70–110)
GLUCOSE SERPL-MCNC: 91 MG/DL (ref 70–110)
HCT VFR BLD AUTO: 35.2 % (ref 40–54)
HDLC SERPL-MCNC: 45 MG/DL (ref 40–75)
HDLC SERPL: 30.2 % (ref 20–50)
HGB BLD-MCNC: 11.3 G/DL (ref 14–18)
LDLC SERPL CALC-MCNC: 74.2 MG/DL (ref 63–159)
MCH RBC QN AUTO: 29.3 PG (ref 27–31)
MCHC RBC AUTO-ENTMCNC: 32.1 G/DL (ref 32–36)
MCV RBC AUTO: 91 FL (ref 82–98)
NONHDLC SERPL-MCNC: 104 MG/DL
PLATELET # BLD AUTO: 208 K/UL (ref 150–450)
PMV BLD AUTO: 9.5 FL (ref 9.2–12.9)
POTASSIUM SERPL-SCNC: 4.3 MMOL/L (ref 3.5–5.1)
POTASSIUM SERPL-SCNC: 4.3 MMOL/L (ref 3.5–5.1)
PROT SERPL-MCNC: 6.2 G/DL (ref 6–8.4)
PROT SERPL-MCNC: 6.2 G/DL (ref 6–8.4)
RBC # BLD AUTO: 3.86 M/UL (ref 4.6–6.2)
SODIUM SERPL-SCNC: 138 MMOL/L (ref 136–145)
SODIUM SERPL-SCNC: 138 MMOL/L (ref 136–145)
TRIGL SERPL-MCNC: 149 MG/DL (ref 30–150)
WBC # BLD AUTO: 6.54 K/UL (ref 3.9–12.7)

## 2022-08-09 PROCEDURE — 85027 COMPLETE CBC AUTOMATED: CPT | Performed by: INTERNAL MEDICINE

## 2022-08-09 PROCEDURE — 80053 COMPREHEN METABOLIC PANEL: CPT | Performed by: INTERNAL MEDICINE

## 2022-08-09 PROCEDURE — 80061 LIPID PANEL: CPT | Performed by: INTERNAL MEDICINE

## 2022-08-09 PROCEDURE — 36415 COLL VENOUS BLD VENIPUNCTURE: CPT | Mod: PO | Performed by: INTERNAL MEDICINE

## 2022-08-18 DIAGNOSIS — D64.9 ANEMIA, UNSPECIFIED TYPE: ICD-10-CM

## 2022-08-18 DIAGNOSIS — E83.51 HYPOCALCEMIA: Primary | ICD-10-CM

## 2022-08-18 NOTE — PROGRESS NOTES
Results have been released via [employer]. Please verify that these have been viewed by patient. If not, please call patient with results.    Please schedule the following orders:  Iron, ferritin, path diff, pth, vit d, phos, mg    I have sent a message to them with the following interpretation (see below).      I have reviewed your recent blood work.     Your complete blood count shows worsening anemia. I ordered iron studies for further work up. May need to go forward with a colonoscopy to check for GI bleeds. You are due for screening c-scope anyway at the end of the year.    Your metabolic panel which shows your electrolytes, glucose, kidney and liver function is within normal limits but calcium is low. Kidney function is slightly declined but stable compared to previous labs.    Your cholesterol is within normal limits.    Please do not hesitate to call or message with any additional questions or concerns.    Iveth Stafford MD

## 2022-08-22 ENCOUNTER — OFFICE VISIT (OUTPATIENT)
Dept: OTOLARYNGOLOGY | Facility: CLINIC | Age: 72
End: 2022-08-22
Payer: MEDICARE

## 2022-08-22 VITALS — BODY MASS INDEX: 26.85 KG/M2 | TEMPERATURE: 99 F | WEIGHT: 191.81 LBS | HEIGHT: 71 IN

## 2022-08-22 DIAGNOSIS — J38.1 VOCAL CORD POLYP: Primary | ICD-10-CM

## 2022-08-22 DIAGNOSIS — R49.0 HOARSENESS: ICD-10-CM

## 2022-08-22 DIAGNOSIS — Z87.891 FORMER SMOKER: ICD-10-CM

## 2022-08-22 PROCEDURE — 99214 OFFICE O/P EST MOD 30 MIN: CPT | Mod: 25,S$PBB,, | Performed by: NURSE PRACTITIONER

## 2022-08-22 PROCEDURE — 99214 PR OFFICE/OUTPT VISIT, EST, LEVL IV, 30-39 MIN: ICD-10-PCS | Mod: 25,S$PBB,, | Performed by: NURSE PRACTITIONER

## 2022-08-22 PROCEDURE — 99999 PR PBB SHADOW E&M-EST. PATIENT-LVL III: CPT | Mod: PBBFAC,,, | Performed by: NURSE PRACTITIONER

## 2022-08-22 PROCEDURE — 31575 DIAGNOSTIC LARYNGOSCOPY: CPT | Mod: PBBFAC,PO | Performed by: NURSE PRACTITIONER

## 2022-08-22 PROCEDURE — 31575 PR LARYNGOSCOPY, FLEXIBLE; DIAGNOSTIC: ICD-10-PCS | Mod: S$PBB,,, | Performed by: NURSE PRACTITIONER

## 2022-08-22 PROCEDURE — 31575 DIAGNOSTIC LARYNGOSCOPY: CPT | Mod: S$PBB,,, | Performed by: NURSE PRACTITIONER

## 2022-08-22 PROCEDURE — 99999 PR PBB SHADOW E&M-EST. PATIENT-LVL III: ICD-10-PCS | Mod: PBBFAC,,, | Performed by: NURSE PRACTITIONER

## 2022-08-22 PROCEDURE — 99213 OFFICE O/P EST LOW 20 MIN: CPT | Mod: PBBFAC,PO,25 | Performed by: NURSE PRACTITIONER

## 2022-08-22 NOTE — PROGRESS NOTES
Subjective:       Patient ID: Luigi Murillo is a 71 y.o. male.    Chief Complaint: Hoarse    HPI   Patient saw me 10 months ago for tinnitus and hearing loss. [employer]. (+)Noise exposure. Patient returns today for dysphonia. Patient denies significant allergic stigmata:  No itchy, red, watery eyes; no itchy, red, watery nose; no excessive sneezing or stuffiness. Patient denies s/s of acute bacterial sinusitis:  No mucopus from nose or throat, no facial swelling/pain, no dental pain, no diminished olfaction/taste, no headaches around the eyes, no sore throat or productive cough. Patient denies s/s of acute bacterial pharyngitis/tonsillitis:  No exudate or marked erythema, no cervical lymphadenopathy or fever.  Patient's only symptoms are dysphonia and globus sensation. Patient reports voice has been rough and hoarse X 18 months. He quit smoking in 2007. Denies smokeless tobacco use. Denies EtOH. Patient reports sometimes hurts to swallow like a cramp.     Review of Systems   Constitutional: Negative.    HENT: Positive for voice change.         Sensation of lump or swelling in the back of his throat   Eyes: Negative.    Respiratory: Negative.    Cardiovascular: Negative.    Gastrointestinal: Negative.    Musculoskeletal: Negative.    Integumentary:  Negative.   Neurological: Negative.    Hematological: Negative.    Psychiatric/Behavioral: Negative.          Objective:      Physical Exam  Vitals and nursing note reviewed.   Constitutional:       General: He is not in acute distress.     Appearance: He is well-developed. He is not ill-appearing or diaphoretic.   HENT:      Head: Normocephalic and atraumatic.      Right Ear: Hearing, tympanic membrane, ear canal and external ear normal. No middle ear effusion. Tympanic membrane is not erythematous.      Left Ear: Hearing, tympanic membrane, ear canal and external ear normal.  No middle ear effusion. Tympanic membrane is not erythematous.      Nose:  Nose normal.      Mouth/Throat:      Pharynx: Uvula midline.   Eyes:      General: Lids are normal. No scleral icterus.        Right eye: No discharge.         Left eye: No discharge.   Neck:      Trachea: Trachea normal. No tracheal deviation.   Cardiovascular:      Rate and Rhythm: Normal rate.   Pulmonary:      Effort: Pulmonary effort is normal. No respiratory distress.      Breath sounds: No stridor. No wheezing.   Musculoskeletal:         General: Normal range of motion.      Cervical back: Normal range of motion and neck supple.   Skin:     General: Skin is warm and dry.      Coloration: Skin is not pale.   Neurological:      Mental Status: He is alert and oriented to person, place, and time.      Coordination: Coordination normal.      Gait: Gait normal.   Psychiatric:         Speech: Speech normal.         Behavior: Behavior normal. Behavior is cooperative.         Thought Content: Thought content normal.         Judgment: Judgment normal.       Procedure: Flexible laryngoscopy    In order to fully examine the upper aerodigestive tract, including the larynx, in a patient with a hyperactive gag reflex, and suboptimal visualization with indirect mirror exam,  flexible endoscopy is required.   After explaining the procedure and obtaining verbal consent, a timeout was performed with the patient's participation according to the universal protocol. Both nasal cavities were anesthetized with 4% Xylocaine spray mixed with Donnell-Synephrine. The flexible laryngoscope  was inserted into the nasal cavity and advanced to visualize the nasal cavity, nasopharynx, the posterior oropharynx, hypopharynx, and the endolarynx with the  findings noted. The scope was removed and the procedure terminated. The patient tolerated this procedure well without apparent complication.     OVERALL FINDINGS  Nasopharynx - the torus is clear. There are no lesions of the posterior wall.   Oropharynx - no lesions of the tongue base. There is no  obvious fullness or asymmetry.  Hypopharynx - there are no lesions of the pyriform sinuses or postcricoid region   Larynx - there are no lesions of the supraglottic or glottic larynx.  Vocal fold mobility is normal.     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 Harvey for video stroboscopy and discussion of possible biopsy

## 2022-08-23 ENCOUNTER — PATIENT MESSAGE (OUTPATIENT)
Dept: OTOLARYNGOLOGY | Facility: CLINIC | Age: 72
End: 2022-08-23
Payer: MEDICARE

## 2022-08-23 DIAGNOSIS — I25.10 CAD IN NATIVE ARTERY: ICD-10-CM

## 2022-08-23 RX ORDER — ATORVASTATIN CALCIUM 40 MG/1
TABLET, FILM COATED ORAL
Qty: 90 TABLET | Refills: 3 | Status: SHIPPED | OUTPATIENT
Start: 2022-08-23 | End: 2024-03-04 | Stop reason: SDUPTHER

## 2022-08-23 NOTE — TELEPHONE ENCOUNTER
Refill Decision Note   Luiig Chidi  is requesting a refill authorization.  Brief Assessment and Rationale for Refill:  Approve     Medication Therapy Plan:       Medication Reconciliation Completed: No   Comments:     No Care Gaps recommended.     Note composed:6:43 AM 08/23/2022

## 2022-08-23 NOTE — TELEPHONE ENCOUNTER
No new care gaps identified.  [facility] Embedded Care Gaps. Reference number: [id number]. 8/23/2022   2:34:31 AM JARETTT

## 2022-08-24 ENCOUNTER — LAB VISIT (OUTPATIENT)
Dept: LAB | Facility: HOSPITAL | Age: 72
End: 2022-08-24
Attending: INTERNAL MEDICINE
Payer: MEDICARE

## 2022-08-24 ENCOUNTER — OFFICE VISIT (OUTPATIENT)
Dept: FAMILY MEDICINE | Facility: CLINIC | Age: 72
End: 2022-08-24
Payer: MEDICARE

## 2022-08-24 VITALS
TEMPERATURE: 98 F | OXYGEN SATURATION: 99 % | HEIGHT: 71 IN | WEIGHT: 196 LBS | SYSTOLIC BLOOD PRESSURE: 85 MMHG | BODY MASS INDEX: 27.44 KG/M2 | HEART RATE: 74 BPM | DIASTOLIC BLOOD PRESSURE: 58 MMHG

## 2022-08-24 DIAGNOSIS — E83.51 HYPOCALCEMIA: ICD-10-CM

## 2022-08-24 DIAGNOSIS — I25.10 CAD IN NATIVE ARTERY: ICD-10-CM

## 2022-08-24 DIAGNOSIS — F32.5 DEPRESSION, MAJOR, IN REMISSION: ICD-10-CM

## 2022-08-24 DIAGNOSIS — I10 PRIMARY HYPERTENSION: Primary | ICD-10-CM

## 2022-08-24 DIAGNOSIS — D64.9 ANEMIA, UNSPECIFIED TYPE: ICD-10-CM

## 2022-08-24 LAB
25(OH)D3+25(OH)D2 SERPL-MCNC: 30 NG/ML (ref 30–96)
BASOPHILS # BLD AUTO: 0.02 K/UL (ref 0–0.2)
BASOPHILS NFR BLD: 0.4 % (ref 0–1.9)
DIFFERENTIAL METHOD: ABNORMAL
EOSINOPHIL # BLD AUTO: 0.2 K/UL (ref 0–0.5)
EOSINOPHIL NFR BLD: 4.1 % (ref 0–8)
ERYTHROCYTE [DISTWIDTH] IN BLOOD BY AUTOMATED COUNT: 13 % (ref 11.5–14.5)
FERRITIN SERPL-MCNC: 47 NG/ML (ref 20–300)
HCT VFR BLD AUTO: 34.3 % (ref 40–54)
HGB BLD-MCNC: 11.1 G/DL (ref 14–18)
IMM GRANULOCYTES # BLD AUTO: 0.02 K/UL (ref 0–0.04)
IMM GRANULOCYTES NFR BLD AUTO: 0.4 % (ref 0–0.5)
IRON SERPL-MCNC: 62 UG/DL (ref 45–160)
LYMPHOCYTES # BLD AUTO: 1.7 K/UL (ref 1–4.8)
LYMPHOCYTES NFR BLD: 31.5 % (ref 18–48)
MAGNESIUM SERPL-MCNC: 1.7 MG/DL (ref 1.6–2.6)
MCH RBC QN AUTO: 30.6 PG (ref 27–31)
MCHC RBC AUTO-ENTMCNC: 32.4 G/DL (ref 32–36)
MCV RBC AUTO: 95 FL (ref 82–98)
MONOCYTES # BLD AUTO: 0.4 K/UL (ref 0.3–1)
MONOCYTES NFR BLD: 8.1 % (ref 4–15)
NEUTROPHILS # BLD AUTO: 3 K/UL (ref 1.8–7.7)
NEUTROPHILS NFR BLD: 55.5 % (ref 38–73)
NRBC BLD-RTO: 0 /100 WBC
PATH REV BLD -IMP: NORMAL
PHOSPHATE SERPL-MCNC: 3.5 MG/DL (ref 2.7–4.5)
PLATELET # BLD AUTO: 161 K/UL (ref 150–450)
PMV BLD AUTO: 10.1 FL (ref 9.2–12.9)
PTH-INTACT SERPL-MCNC: 198.6 PG/ML (ref 9–77)
RBC # BLD AUTO: 3.63 M/UL (ref 4.6–6.2)
SATURATED IRON: 19 % (ref 20–50)
TOTAL IRON BINDING CAPACITY: 326 UG/DL (ref 250–450)
TRANSFERRIN SERPL-MCNC: 220 MG/DL (ref 200–375)
WBC # BLD AUTO: 5.31 K/UL (ref 3.9–12.7)

## 2022-08-24 PROCEDURE — 84466 ASSAY OF TRANSFERRIN: CPT | Performed by: INTERNAL MEDICINE

## 2022-08-24 PROCEDURE — 85060 PATHOLOGIST REVIEW: ICD-10-PCS | Mod: ,,, | Performed by: PATHOLOGY

## 2022-08-24 PROCEDURE — 85025 COMPLETE CBC W/AUTO DIFF WBC: CPT | Performed by: INTERNAL MEDICINE

## 2022-08-24 PROCEDURE — 99999 PR PBB SHADOW E&M-EST. PATIENT-LVL IV: CPT | Mod: PBBFAC,,, | Performed by: INTERNAL MEDICINE

## 2022-08-24 PROCEDURE — 99213 OFFICE O/P EST LOW 20 MIN: CPT | Mod: S$PBB,,, | Performed by: INTERNAL MEDICINE

## 2022-08-24 PROCEDURE — 83970 ASSAY OF PARATHORMONE: CPT | Performed by: INTERNAL MEDICINE

## 2022-08-24 PROCEDURE — 99213 PR OFFICE/OUTPT VISIT, EST, LEVL III, 20-29 MIN: ICD-10-PCS | Mod: S$PBB,,, | Performed by: INTERNAL MEDICINE

## 2022-08-24 PROCEDURE — 84100 ASSAY OF PHOSPHORUS: CPT | Performed by: INTERNAL MEDICINE

## 2022-08-24 PROCEDURE — 85060 BLOOD SMEAR INTERPRETATION: CPT | Mod: ,,, | Performed by: PATHOLOGY

## 2022-08-24 PROCEDURE — 99999 PR PBB SHADOW E&M-EST. PATIENT-LVL IV: ICD-10-PCS | Mod: PBBFAC,,, | Performed by: INTERNAL MEDICINE

## 2022-08-24 PROCEDURE — 99214 OFFICE O/P EST MOD 30 MIN: CPT | Mod: PBBFAC,PO | Performed by: INTERNAL MEDICINE

## 2022-08-24 PROCEDURE — 82306 VITAMIN D 25 HYDROXY: CPT | Performed by: INTERNAL MEDICINE

## 2022-08-24 PROCEDURE — 82728 ASSAY OF FERRITIN: CPT | Performed by: INTERNAL MEDICINE

## 2022-08-24 PROCEDURE — 83735 ASSAY OF MAGNESIUM: CPT | Performed by: INTERNAL MEDICINE

## 2022-08-24 PROCEDURE — 36415 COLL VENOUS BLD VENIPUNCTURE: CPT | Mod: PO | Performed by: INTERNAL MEDICINE

## 2022-08-24 RX ORDER — ESCITALOPRAM OXALATE 5 MG/1
5 TABLET ORAL DAILY
Qty: 30 TABLET | Refills: 1 | Status: SHIPPED | OUTPATIENT
Start: 2022-08-24 | End: 2022-09-28 | Stop reason: SDUPTHER

## 2022-08-24 RX ORDER — LISINOPRIL 2.5 MG/1
2.5 TABLET ORAL DAILY
Qty: 90 TABLET | Refills: 3 | Status: SHIPPED | OUTPATIENT
Start: 2022-08-24 | End: 2022-10-13 | Stop reason: SDUPTHER

## 2022-08-24 RX ORDER — TIZANIDINE 4 MG/1
1 TABLET ORAL DAILY PRN
COMMUNITY
Start: 2022-08-17

## 2022-08-24 NOTE — PATIENT INSTRUCTIONS
-Decrease Lisinopril 2.5 mg daily and monitor blood pressure at home.   -start lexapro 5 mg daily. Continue wellbutrin

## 2022-08-24 NOTE — PROGRESS NOTES
Assessment/Plan:    Problem List Items Addressed This Visit        Psychiatric    Depression, major, in remission    Overview     -currently on wellbutrin  mg  -tried to wean and start cymbalta instead but had withdrawal symptoms and AE with cymbalta  -plan to continue Wellbutrin  mg but will add low dose of lexapro 5 mg QD  -discussed risk of discontinuing this medication without tapering  -patient was educated, advised of side effects, and all questions were answered. Patient voiced understanding  -patient will follow up routinely and notify us if having any side effects or worsening or persistent symptoms. ER precautions were given.           Relevant Medications    EScitalopram oxalate (LEXAPRO) 5 MG Tab       Cardiac/Vascular    Hypertension - Primary    Overview     Hypertension Medications             lisinopriL (PRINIVIL,ZESTRIL) 5 MG tablet TAKE 1 TABLET DAILY    metoprolol succinate (TOPROL-XL) 25 MG 24 hr tablet TAKE ONE-HALF (1/2) TABLET DAILY      -low today in clinic; asymptomatic  -decrease lisinopril 5 mg ->2.5 mg QD  -monitor BP at home  -continue lifestyle modification with low sodium diet and exercise   -discussed hypertension disease course and importance of treating high blood pressure  -patient understood and advised of risk of untreated blood pressure.  ER precautions were given   for symptoms of hypertensive urgency and emergency.             Other Visit Diagnoses     CAD in native artery        Relevant Medications    lisinopriL (PRINIVIL,ZESTRIL) 2.5 MG tablet          Follow up in about 4 weeks (around 9/21/2022) for F/U with me.    Iveth Stafford MD  _____________________________________________________________________________________________________________________________________________________    CC: depression follow up    HPI:    Patient is in clinic today as an established patient.    Present last month for routine follow up but with complaint of recent worsening  depression and anxiety symptoms. This has been a chronic issue and has been on Wellbutrin for years. Discussed changing from wellbutrin to cymbalta but he was having difficulty with withdrawal symptoms. He was started back on wellbutrin with plan to continue cymbalta but was making him tired. Patient still having depression symptoms. Continues to denies SI/HI. Patient is open to trying a different SSRI/SNRI with the wellbutrin.    Recent ENT appt with laryngoscope showing vocal cord polyp. Has ENT follow up next week.     No other new complaints today.  Remaining chronic conditions have been reviewed and remain stable. Further detail as stated above.      Past Medical History:  Past Medical History:   Diagnosis Date    BPH (benign prostatic hypertrophy) with urinary obstruction     CAD (coronary artery disease) May and Yarely 2011    x2    Chronic pain     DDD (degenerative disc disease), lumbar     Depression, major, in remission     Hyperlipidemia LDL goal < 100     Hypertension     MRSA cellulitis     Osteoarthritis     Trigger finger, right ring finger 7/17/2017     Past Surgical History:   Procedure Laterality Date    COLONOSCOPY N/A 12/17/2019    Procedure: COLONOSCOPY;  Surgeon: Diann Negrete MD;  [facility];  Service: Endoscopy;  Laterality: N/A;    CORONARY ANGIOPLASTY      2 Stents placed a few year ago    Epidural steroid injection      Pain management    INJURY      JOINT REPLACEMENT  Jan 18, 2022    KNEE SURGERY      Right, x2    MANDIBLE FRACTURE SURGERY      accident during  service    REPAIR OF LIGAMENT OF SHOULDER Right     ROTATOR CUFF REPAIR      right times 2    VASECTOMY       Review of patient's allergies indicates:   Allergen Reactions    Acetaminophen-codeine Rash     #3     Social History     Tobacco Use    Smoking status: Former Smoker     Packs/day: 1.00     Years: 25.00     Pack years: 25.00     Quit date: 5/11/2007     Years since quitting: 15.2     Smokeless tobacco: Never Used   Substance Use Topics    Alcohol use: No    Drug use: Never     Family History   Problem Relation Age of Onset    Heart disease Father     Vision loss Father     Emphysema Mother     Cancer Mother         unknown    Heart failure Mother     Arthritis Mother     COPD Mother     Heart disease Mother     Prostate cancer Brother     Diabetes Paternal Aunt      Current Outpatient Medications on File Prior to Visit   Medication Sig Dispense Refill    aspirin (ECOTRIN) 81 MG EC tablet Take 81 mg by mouth once daily.      atorvastatin (LIPITOR) 40 MG tablet TAKE 1 TABLET DAILY 90 tablet 3    buPROPion (WELLBUTRIN XL) 300 MG 24 hr tablet Take 1 tablet (300 mg total) by mouth once daily. 90 tablet 3    metoprolol succinate (TOPROL-XL) 25 MG 24 hr tablet TAKE ONE-HALF (1/2) TABLET DAILY 45 tablet 3    morphine (MS CONTIN) 30 MG 12 hr tablet Take 30 mg by mouth 3 (three) times daily.      oxyCODONE-acetaminophen (PERCOCET)  mg per tablet       tamsulosin (FLOMAX) 0.4 mg Cap TAKE 2 CAPSULES EVERY EVENING 180 capsule 0    tiZANidine (ZANAFLEX) 4 MG tablet Take 1 tablet by mouth daily as needed.      [DISCONTINUED] lisinopriL (PRINIVIL,ZESTRIL) 5 MG tablet TAKE 1 TABLET DAILY 90 tablet 3    [DISCONTINUED] cyclobenzaprine (FLEXERIL) 10 MG tablet TAKE 1 TABLET NIGHTLY AS NEEDED FOR MUSCLE SPASMS (Patient not taking: Reported on 8/24/2022) 90 tablet 3    [DISCONTINUED] DULoxetine (CYMBALTA) 30 MG capsule Take 1 capsule (30 mg total) by mouth once daily. (Patient not taking: Reported on 8/24/2022) 30 capsule 1    [DISCONTINUED] HYDROcodone-acetaminophen (NORCO) 7.5-325 mg per tablet Take 1 tablet by mouth every 6 (six) hours as needed for Pain. (Patient not taking: Reported on 8/24/2022) 28 tablet 0    [DISCONTINUED] ranolazine (RANEXA) 500 MG Tb12 TAKE 1 TABLET NIGHTLY (Patient not taking: Reported on 8/24/2022) 90 tablet 0     No current facility-administered  "medications on file prior to visit.       Review of Systems   Constitutional: Negative for chills, diaphoresis, fatigue and fever.   HENT: Negative for congestion, ear pain, postnasal drip, sinus pain and sore throat.    Eyes: Negative for pain and redness.   Respiratory: Negative for cough, chest tightness and shortness of breath.    Cardiovascular: Negative for chest pain and leg swelling.   Gastrointestinal: Negative for abdominal pain, constipation, diarrhea, nausea and vomiting.   Genitourinary: Negative for dysuria and hematuria.   Musculoskeletal: Negative for arthralgias and joint swelling.   Skin: Negative for rash.   Neurological: Negative for dizziness, syncope and headaches.   Psychiatric/Behavioral: Positive for dysphoric mood. Negative for suicidal ideas.       Vitals:    08/24/22 0853   BP: (!) 85/58   Pulse: 74   Temp: 97.9 °F (36.6 °C)   SpO2: 99%   Weight: 88.9 kg (196 lb)   Height: 5' 11" (1.803 m)       Wt Readings from Last 3 Encounters:   08/24/22 88.9 kg (196 lb)   08/22/22 87 kg (191 lb 12.8 oz)   07/20/22 87.5 kg (193 lb)       Physical Exam  Constitutional:       General: He is not in acute distress.     Appearance: Normal appearance. He is well-developed.   HENT:      Head: Normocephalic and atraumatic.   Eyes:      Conjunctiva/sclera: Conjunctivae normal.   Cardiovascular:      Rate and Rhythm: Normal rate and regular rhythm.      Pulses: Normal pulses.      Heart sounds: Normal heart sounds. No murmur heard.  Pulmonary:      Effort: Pulmonary effort is normal. No respiratory distress.      Breath sounds: Normal breath sounds.   Abdominal:      General: Bowel sounds are normal. There is no distension.      Palpations: Abdomen is soft.      Tenderness: There is no abdominal tenderness.   Musculoskeletal:         General: Normal range of motion.      Cervical back: Normal range of motion and neck supple.   Skin:     General: Skin is warm and dry.      Findings: No rash.   Neurological:      " General: No focal deficit present.      Mental Status: He is alert and oriented to person, place, and time.   Psychiatric:         Mood and Affect: Mood normal.         Behavior: Behavior normal.         Health Maintenance   Topic Date Due    Lipid Panel  08/09/2023    High Dose Statin  08/24/2023    Aspirin/Antiplatelet Therapy  08/24/2023    TETANUS VACCINE  10/15/2028    Hepatitis C Screening  Completed    Abdominal Aortic Aneurysm Screening  Completed

## 2022-08-25 LAB — PATH REV BLD -IMP: NORMAL

## 2022-09-02 ENCOUNTER — OFFICE VISIT (OUTPATIENT)
Dept: OTOLARYNGOLOGY | Facility: CLINIC | Age: 72
End: 2022-09-02
Payer: MEDICARE

## 2022-09-02 VITALS — WEIGHT: 195.13 LBS | HEIGHT: 71 IN | BODY MASS INDEX: 27.32 KG/M2

## 2022-09-02 DIAGNOSIS — J38.3 VOCAL FOLD CYST: Primary | ICD-10-CM

## 2022-09-02 DIAGNOSIS — R49.0 DYSPHONIA: ICD-10-CM

## 2022-09-02 PROCEDURE — 99213 OFFICE O/P EST LOW 20 MIN: CPT | Mod: 25,S$PBB,, | Performed by: STUDENT IN AN ORGANIZED HEALTH CARE EDUCATION/TRAINING PROGRAM

## 2022-09-02 PROCEDURE — 31579 PR LARYNGOSCOPY, FLEX/RIGID TELESCOPIC, W/STROBOSCOPY: ICD-10-PCS | Mod: S$PBB,,, | Performed by: STUDENT IN AN ORGANIZED HEALTH CARE EDUCATION/TRAINING PROGRAM

## 2022-09-02 PROCEDURE — 99214 OFFICE O/P EST MOD 30 MIN: CPT | Mod: PBBFAC,PO,25 | Performed by: STUDENT IN AN ORGANIZED HEALTH CARE EDUCATION/TRAINING PROGRAM

## 2022-09-02 PROCEDURE — 99999 PR PBB SHADOW E&M-EST. PATIENT-LVL IV: ICD-10-PCS | Mod: PBBFAC,,, | Performed by: STUDENT IN AN ORGANIZED HEALTH CARE EDUCATION/TRAINING PROGRAM

## 2022-09-02 PROCEDURE — 31579 LARYNGOSCOPY TELESCOPIC: CPT | Mod: S$PBB,,, | Performed by: STUDENT IN AN ORGANIZED HEALTH CARE EDUCATION/TRAINING PROGRAM

## 2022-09-02 PROCEDURE — 31579 LARYNGOSCOPY TELESCOPIC: CPT | Mod: PBBFAC,PO | Performed by: STUDENT IN AN ORGANIZED HEALTH CARE EDUCATION/TRAINING PROGRAM

## 2022-09-02 PROCEDURE — 99999 PR PBB SHADOW E&M-EST. PATIENT-LVL IV: CPT | Mod: PBBFAC,,, | Performed by: STUDENT IN AN ORGANIZED HEALTH CARE EDUCATION/TRAINING PROGRAM

## 2022-09-02 PROCEDURE — 99213 PR OFFICE/OUTPT VISIT, EST, LEVL III, 20-29 MIN: ICD-10-PCS | Mod: 25,S$PBB,, | Performed by: STUDENT IN AN ORGANIZED HEALTH CARE EDUCATION/TRAINING PROGRAM

## 2022-09-02 RX ORDER — FAMOTIDINE 20 MG/1
20 TABLET, FILM COATED ORAL NIGHTLY
Qty: 90 TABLET | Refills: 0 | Status: SHIPPED | OUTPATIENT
Start: 2022-09-02 | End: 2022-10-12

## 2022-09-02 NOTE — PROGRESS NOTES
Subjective:       Patient ID: Luigi Murillo is a 71 y.o. male.    Chief Complaint: vocal cord lesion    HPI   8/22/22  Patient saw me 10 months ago for tinnitus and hearing loss. [employer] . (+)Noise exposure. Patient returns today for dysphonia. Patient denies significant allergic stigmata:  No itchy, red, watery eyes; no itchy, red, watery nose; no excessive sneezing or stuffiness. Patient denies s/s of acute bacterial sinusitis:  No mucopus from nose or throat, no facial swelling/pain, no dental pain, no diminished olfaction/taste, no headaches around the eyes, no sore throat or productive cough. Patient denies s/s of acute bacterial pharyngitis/tonsillitis:  No exudate or marked erythema, no cervical lymphadenopathy or fever.  Patient's only symptoms are dysphonia and globus sensation. Patient reports voice has been rough and hoarse X 18 months. He quit smoking in 2007. Denies smokeless tobacco use. Denies EtOH. Patient reports sometimes hurts to swallow like a cramp.     9/2/22: Patient referred by Jena to evaluate vocal fold lesion. Reports hoarseness for a couple of years, not sure how it started. Daughter told him he sounded hoarse. Not worse. He does have odynophagia, sometimes his ear hurts, today is the left ear. Denies dysphagia, dyspnea. No coughing. Does not feel strained. Smoking history above. No known reflux. Has retired due to sick wife. 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 Does throat clear a lot, has globus sensation. Does feel like he clears it, but comes back. No reflux medicine. Denies any sinus complaints or PND. No weight loss, no neck masses but does sometimes feel like he gets a cramp in his neck and cannot swallow because it hurts, usually the right, eventually goes. Voice never sounds normal, sometimes worse, never better. Has not entirely lost it. No lung disease. No blood thinners.     Drinks 3-4 bottles of water a day, 1 cup coffee  and a couple of diet cokes a day. Minimal greasy, spicy food. Maybe tomato sauce once a week. No chocolate. Has a couple of hours between dinner and bedtime.     Review of Systems   Constitutional: Negative.    HENT:  Positive for voice change.         Sensation of lump or swelling in the back of his throat   Eyes: Negative.    Respiratory: Negative.     Cardiovascular: Negative.    Gastrointestinal: Negative.    Musculoskeletal: Negative.    Integumentary:  Negative.   Neurological: Negative.    Hematological: Negative.    Psychiatric/Behavioral: Negative.         Objective:      Physical Exam  Vitals and nursing note reviewed.   Constitutional:       General: He is not in acute distress.     Appearance: He is well-developed. He is not ill-appearing or diaphoretic.   HENT:      Head: Normocephalic and atraumatic.      Right Ear: Hearing, tympanic membrane, ear canal and external ear normal. No middle ear effusion. Tympanic membrane is not erythematous.      Left Ear: Hearing, tympanic membrane, ear canal and external ear normal.  No middle ear effusion. Tympanic membrane is not erythematous.      Nose: Nose normal.      Mouth/Throat:      Pharynx: Uvula midline.   Eyes:      General: Lids are normal. No scleral icterus.        Right eye: No discharge.         Left eye: No discharge.   Neck:      Trachea: Trachea normal. No tracheal deviation.   Cardiovascular:      Rate and Rhythm: Normal rate.   Pulmonary:      Effort: Pulmonary effort is normal. No respiratory distress.      Breath sounds: No stridor. No wheezing.   Musculoskeletal:         General: Normal range of motion.      Cervical back: Normal range of motion and neck supple.   Skin:     General: Skin is warm and dry.      Coloration: Skin is not pale.   Neurological:      Mental Status: He is alert and oriented to person, place, and time.      Coordination: Coordination normal.      Gait: Gait normal.   Psychiatric:         Speech: Speech normal.          Behavior: Behavior normal. Behavior is cooperative.         Thought Content: Thought content normal.         Judgment: Judgment normal.     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 A diagnosis of Dysphonia was also pertinent to this visit.     Post-procedure diagnosis: same    Procedure: Flexible fiberoptic laryngoscopy with stroboscopy    Surgeon: Wandy Dan MD    Anesthesia: 4% Lidocaine with 0.25% Phenylephrine topical    Risks, benefits, and alternatives of the procedure were discussed with the patient, and the patient consented to the fiberoptic examination.  We applied a topical nasal decongestant and analgesic.  After adequate anesthesia was obtained, the flexible fiberoptic scope was passed through the nasal cavity. The entire pharynx (nasopharynx to hypopharynx) and the larynx were visualized. At the end of the examination, the scope was removed. The patient tolerated the procedure well with no complications.     Findings:  -     Laryngeal mucosa is normal  -     Post-cricoid region: normal  -     Lingual tonsils normal, pharynx crowded, short oropharynx  -     Adenoids have NO  hypertrophy  -     Right vocal fold: normal mobility     mass/lesion: none  -     Left vocal fold: normal mobility     mass/lesion: cyst as seen below  -     Other findings: none       Stroboscopy Findings:  - Closure: small gap, hourglass  - Periodicity: decent  - Mucosal Wave: present right, absent left over most of fold  - Amplitude: slightly decreased on left  - Symmetry: asymmetric         Assessment:   Vocal fold cyst  Dysphonia        Plan:     Discussed options, preferred treatment is trial of voice therapy, reflux medicine first to see of we can improve voice. Can consider surgery, which may be necessary as this is a cyst and will not go away, depending on voice outcomes with therapy and how much it bothers him. Hesitant to start with surgery with risk of scarring and  persistent hoarseness. Very low suspicion for malignancy. We will start famotidine nightly. He takes care of his sick wife and has significant time constraints. He agrees to take the medicine and make it to at least one voice session. This will also likely help intermittent neck strain. He will return in 2 months to recheck. Can discuss surgical excision if hoarseness persistent and he desires.     Wandy Dan MD

## 2022-09-05 DIAGNOSIS — D64.9 ANEMIA, UNSPECIFIED TYPE: Primary | ICD-10-CM

## 2022-09-05 DIAGNOSIS — E55.9 VITAMIN D INSUFFICIENCY: ICD-10-CM

## 2022-09-05 DIAGNOSIS — E83.51 HYPOCALCEMIA: ICD-10-CM

## 2022-09-05 NOTE — PROGRESS NOTES
Results have been released via [employer]. Please verify that these have been viewed by patient. If not, please call patient with results.    Please schedule the following orders:  Vit d, pth, bmp, cbc in 8 weeks    I have sent a message to them with the following interpretation (see below).    I have reviewed your recent lab results.    Vitamin d is slightly low. This could be cause of low calcium recently. I recommend that you start once daily over the counter vitamin d3 1000 units daily. We would repeat the labs in a couple of months.    Iron levels and blood smear are normal. Plan to continue with colon cancer screening as recommended in December this year.    Please do not hesitate to call or message with any additional questions or concerns.    Iveth Stafford MD

## 2022-09-18 ENCOUNTER — PATIENT MESSAGE (OUTPATIENT)
Dept: SPEECH THERAPY | Facility: HOSPITAL | Age: 72
End: 2022-09-18
Payer: MEDICARE

## 2022-09-28 ENCOUNTER — PATIENT MESSAGE (OUTPATIENT)
Dept: FAMILY MEDICINE | Facility: CLINIC | Age: 72
End: 2022-09-28
Payer: MEDICARE

## 2022-09-28 ENCOUNTER — CLINICAL SUPPORT (OUTPATIENT)
Dept: SPEECH THERAPY | Facility: HOSPITAL | Age: 72
End: 2022-09-28
Attending: STUDENT IN AN ORGANIZED HEALTH CARE EDUCATION/TRAINING PROGRAM
Payer: MEDICARE

## 2022-09-28 DIAGNOSIS — R49.0 DYSPHONIA: ICD-10-CM

## 2022-09-28 DIAGNOSIS — J38.3 VOCAL FOLD CYST: Primary | ICD-10-CM

## 2022-09-28 DIAGNOSIS — F32.5 DEPRESSION, MAJOR, IN REMISSION: ICD-10-CM

## 2022-09-28 DIAGNOSIS — J38.3 VOCAL FOLD CYST: ICD-10-CM

## 2022-09-28 PROCEDURE — 92524 BEHAVRAL QUALIT ANALYS VOICE: CPT | Mod: GN,95 | Performed by: SPEECH-LANGUAGE PATHOLOGIST

## 2022-09-28 RX ORDER — ESCITALOPRAM OXALATE 5 MG/1
5 TABLET ORAL DAILY
Qty: 30 TABLET | Refills: 1 | Status: SHIPPED | OUTPATIENT
Start: 2022-09-28 | End: 2022-11-30 | Stop reason: SDUPTHER

## 2022-09-28 NOTE — TELEPHONE ENCOUNTER
No new care gaps identified.  [facility] Embedded Care Gaps. Reference number: [id number]. 9/28/2022   4:33:40 PM CDT

## 2022-09-28 NOTE — Clinical Note
Dr. Dan,  Mr. Murillo was stimulable for some improvement in voice therapy.  He is motivated to improve his voice quality and reduce doctors visits spent on himself in the interest of caring for his wife, so I think to some extent he is motivated to get surgery, but I am optimistic the therapy strategies I gave him will reduce his roughness for the time being.  I am going to try to see him once more virtually prior to his follow up appt with you in November.  Thanks again for the referral!  [place]

## 2022-09-28 NOTE — PROGRESS NOTES
Referring provider: Dr. Wandy Dan  Reason for visit:  Behavioral and qualitative analysis of voice and resonance (CPT 00768)    The patient location is: [place]  The chief complaint leading to consultation is: voice  Visit type: audiovisual  Face to Face time with patient: 40  50 minutes of total time spent on the encounter, which includes face to face time and non-face to face time preparing to see the patient (eg, review of tests), Obtaining and/or reviewing separately obtained history, Documenting clinical information in the electronic or other health record, Independently interpreting results (not separately reported) and communicating results to the patient/family/caregiver, or Care coordination (not separately reported).     Subjective / History    Luigi Murillo is a 71 y.o. male referred for voice evaluation (CPT 65725) by Dr. Dan.  He presents with complaints of hoarseness which began several years ago.  The patient also endorses: voice worse in afternoon/evening, fatigue with use, pain with use, and reduced volume.  Talking gives him a sore throat.  Stays home with his wife who is disabled.  Sometimes needs to repeat himself.  Denies dysphagia.  Dr. Dan noted a possible vocal fold cyst.      Stroboscopy findings (per Dr. Dan on 9/2/22)  -     Right vocal fold:    normal mobility                                      mass/lesion: none  -     Left vocal fold:      normal mobility                                      mass/lesion: cyst as seen below  -     Other findings: none     - Closure: small gap, hourglass  - Periodicity: decent  - Mucosal Wave: present right, absent left over most of fold  - Amplitude: slightly decreased on left  - Symmetry: asymmetric     Past Medical History:   Diagnosis Date    BPH (benign prostatic hypertrophy) with urinary obstruction     CAD (coronary artery disease) May and Yarely 2011    x2    Chronic pain     DDD (degenerative disc disease), lumbar     Depression,  major, in remission     Hyperlipidemia LDL goal < 100     Hypertension     MRSA cellulitis     Osteoarthritis     Trigger finger, right ring finger 7/17/2017     Current Outpatient Medications on File Prior to Visit   Medication Sig Dispense Refill    aspirin (ECOTRIN) 81 MG EC tablet Take 81 mg by mouth once daily.      atorvastatin (LIPITOR) 40 MG tablet TAKE 1 TABLET DAILY 90 tablet 3    buPROPion (WELLBUTRIN XL) 300 MG 24 hr tablet Take 1 tablet (300 mg total) by mouth once daily. 90 tablet 3    EScitalopram oxalate (LEXAPRO) 5 MG Tab Take 1 tablet (5 mg total) by mouth once daily. 30 tablet 1    famotidine (PEPCID) 20 MG tablet Take 1 tablet (20 mg total) by mouth every evening. 90 tablet 0    lisinopriL (PRINIVIL,ZESTRIL) 2.5 MG tablet Take 1 tablet (2.5 mg total) by mouth once daily. 90 tablet 3    metoprolol succinate (TOPROL-XL) 25 MG 24 hr tablet TAKE ONE-HALF (1/2) TABLET DAILY 45 tablet 3    morphine (MS CONTIN) 30 MG 12 hr tablet Take 30 mg by mouth 3 (three) times daily.      oxyCODONE-acetaminophen (PERCOCET)  mg per tablet       tamsulosin (FLOMAX) 0.4 mg Cap TAKE 2 CAPSULES EVERY EVENING 180 capsule 3    tiZANidine (ZANAFLEX) 4 MG tablet Take 1 tablet by mouth daily as needed.       No current facility-administered medications on file prior to visit.       Objective    Perceptual/behavioral assessment  -CAPE-V Overall Score: 68  -Quality: rough and strained  -Volume: appropriate for age and gender  -Pitch: low F0  -Flexibility: diminished  -Habitual respiratory pattern: chest/clavicular    Education / Stimulability Trials   Discussed importance of vocal hygiene including: hydration. Patient was stimulable for improved voice using resonant exercises.  Instructed pt on resonant /m/ phonation and he was stimulable for identifying a more forward resonance with reduced roughness.  Able to carryover to connected speech with moderate clinician cueing.  Encouraged practicing exercises several times  daily in isolation and into short phrases to solidify muscle memory patterns and reduce extralaryngeal tension during speech tasks.  He was amenable to all suggestions.     Functional goals  Length Status Goal   Long term Initiated Patient will implement and adhere to vocal hygiene protocols on a daily basis, including the elimination of phonotraumatic behaviors.    Long term Initiated Patient and clinician will facilitate changes in vocal function in order to restore functional use of voice for daily occupational, social, and emotional demands.    Long term Initiated Patient will re-establish phonation with adequate balance of airflow and resonance with decreased muscle tension.    Short term Initiated Patient will coordinate vocal subsystems in hierarchical speech tasks by producing sound in an efficient manner yielding improved or normal voice quality and vocal endurance in the presence of existing laryngeal disorder with 90% accuracy independently.   Short term Initiated Patient will complete SOVT exercises and/or resonant-focused exercises 3-5x daily to strengthen and balance the intrinsic laryngeal musculature and maximize glottic closure without medial hyperfunction.   Short term Initiated Patient will improve the quality, efficiency, and ease of phonation through resonant and/or airflow exercises from the syllable to conversation level with 80% accuracy.   Short term Initiated Patient will discriminate between easy and strained phonation with 80% accuracy.    Short term Initiated Patient will identify the sensations associated with muscle relaxation in the abdominal, thoracic, neck and facial areas during efficient phonation with minimal clinician cue.        Assessment     Luigi Murillo presents with moderate dysphonia.  The encounter diagnosis was Vocal fold cyst.  Prognosis for continued improvement is good/fair given diagnosis.      Recommendations / POC    Recommend 2-4 sessions of voice/speech  therapy over 4-8 weeks with a speech-language pathologist to optimize glottal postures for improved vocal function, vocal efficiency, and ease of phonation.  He should continue the exercises as discussed in session and should contact me with any further questions.

## 2022-10-12 ENCOUNTER — PATIENT MESSAGE (OUTPATIENT)
Dept: FAMILY MEDICINE | Facility: CLINIC | Age: 72
End: 2022-10-12
Payer: MEDICARE

## 2022-10-12 ENCOUNTER — OFFICE VISIT (OUTPATIENT)
Dept: FAMILY MEDICINE | Facility: CLINIC | Age: 72
End: 2022-10-12
Payer: MEDICARE

## 2022-10-12 VITALS
HEIGHT: 71 IN | TEMPERATURE: 99 F | SYSTOLIC BLOOD PRESSURE: 93 MMHG | DIASTOLIC BLOOD PRESSURE: 60 MMHG | WEIGHT: 198.44 LBS | HEART RATE: 82 BPM | BODY MASS INDEX: 27.78 KG/M2

## 2022-10-12 DIAGNOSIS — F32.5 DEPRESSION, MAJOR, IN REMISSION: ICD-10-CM

## 2022-10-12 DIAGNOSIS — Z23 INFLUENZA VACCINE NEEDED: Primary | ICD-10-CM

## 2022-10-12 DIAGNOSIS — E78.5 HYPERLIPIDEMIA WITH TARGET LDL LESS THAN 100: ICD-10-CM

## 2022-10-12 DIAGNOSIS — I10 PRIMARY HYPERTENSION: ICD-10-CM

## 2022-10-12 DIAGNOSIS — I25.10 CAD IN NATIVE ARTERY: ICD-10-CM

## 2022-10-12 DIAGNOSIS — I25.10 CORONARY ARTERY DISEASE INVOLVING NATIVE CORONARY ARTERY OF NATIVE HEART WITHOUT ANGINA PECTORIS: ICD-10-CM

## 2022-10-12 PROCEDURE — 99999 PR PBB SHADOW E&M-EST. PATIENT-LVL IV: CPT | Mod: PBBFAC,,, | Performed by: INTERNAL MEDICINE

## 2022-10-12 PROCEDURE — 99214 PR OFFICE/OUTPT VISIT, EST, LEVL IV, 30-39 MIN: ICD-10-PCS | Mod: S$PBB,,, | Performed by: INTERNAL MEDICINE

## 2022-10-12 PROCEDURE — G0008 ADMIN INFLUENZA VIRUS VAC: HCPCS | Mod: PBBFAC,PO

## 2022-10-12 PROCEDURE — 99214 OFFICE O/P EST MOD 30 MIN: CPT | Mod: PBBFAC,PO | Performed by: INTERNAL MEDICINE

## 2022-10-12 PROCEDURE — 99999 PR PBB SHADOW E&M-EST. PATIENT-LVL IV: ICD-10-PCS | Mod: PBBFAC,,, | Performed by: INTERNAL MEDICINE

## 2022-10-12 PROCEDURE — 99214 OFFICE O/P EST MOD 30 MIN: CPT | Mod: S$PBB,,, | Performed by: INTERNAL MEDICINE

## 2022-10-12 RX ORDER — FAMOTIDINE 20 MG/1
20 TABLET, FILM COATED ORAL NIGHTLY
COMMUNITY
End: 2024-03-11

## 2022-10-12 NOTE — PROGRESS NOTES
Assessment/Plan:    Problem List Items Addressed This Visit          Psychiatric    Depression, major, in remission    Overview     -currently on wellbutrin  mg and lexapro   -tried to wean from wellbutrin and start cymbalta instead but had withdrawal symptoms and AE with cymbalta  -continued on Wellbutrin  mg but added lexapro 5 mg QD  -patient states that symptoms may be working too much, feeling slightly checked out at times. He would like to stay on for a couple more weeks and see if this improves  -discussed risk of discontinuing this medication without tapering  -patient was educated, advised of side effects, and all questions were answered. Patient voiced understanding  -patient will follow up routinely and notify us if having any side effects or worsening or persistent symptoms. ER precautions were given.            Cardiac/Vascular    CAD (coronary artery disease)    Overview     -S/p PCI x 2  -also with hx of stable angina, on ranexa  -followed by Dr. Barros  -on ASA/statin and metoprolol         Hyperlipidemia with target LDL less than 100    Overview     Hyperlipidemia Medications               atorvastatin (LIPITOR) 40 MG tablet TAKE 1 TABLET DAILY   -chronic condition. Currently stable.    -reports compliance with hyperlipidemia treatment as prescribed  -denies any known adverse effects of medications  -most recent labs listed below; due for repeat labs  Lab Results   Component Value Date    CHOL 149 08/09/2022     Lab Results   Component Value Date    HDL 45 08/09/2022     Lab Results   Component Value Date    LDLCALC 74.2 08/09/2022     Lab Results   Component Value Date    TRIG 149 08/09/2022     Lab Results   Component Value Date    ALT 22 08/09/2022    ALT 22 08/09/2022    AST 19 08/09/2022    AST 19 08/09/2022    ALKPHOS 70 08/09/2022    ALKPHOS 70 08/09/2022    BILITOT 0.3 08/09/2022    BILITOT 0.3 08/09/2022            Hypertension    Overview     Hypertension Medications                lisinopriL (PRINIVIL,ZESTRIL) 5 MG tablet TAKE 1 TABLET DAILY    metoprolol succinate (TOPROL-XL) 25 MG 24 hr tablet TAKE ONE-HALF (1/2) TABLET DAILY   -low today in clinic; asymptomatic  -decrease lisinopril 5 mg ->2.5 mg QD  -monitor BP at home  -continue lifestyle modification with low sodium diet and exercise   -discussed hypertension disease course and importance of treating high blood pressure  -patient understood and advised of risk of untreated blood pressure.  ER precautions were given   for symptoms of hypertensive urgency and emergency.          Other Visit Diagnoses       Influenza vaccine needed    -  Primary    Relevant Orders    Influenza - Quadrivalent (Adjuvanted) (Completed)            Follow up in about 6 months (around 4/12/2023), or if symptoms worsen or fail to improve.    Iveth Stafford MD  _____________________________________________________________________________________________________________________________________________________    CC: follow up of chronic medical conditions     HPI:    Patient is in clinic today as an established patient.    HTN: The patient is currently being treated for essential hypertension. This condition is chronic and stable. The patient is tolerating their medication well with good compliance.  Denies any adverse effects of medications.  Counseling was offered regarding low sodium diet.  The patient has a reduced salt intake. Routine exercise recommended. The patient denies headache, vision changes, chest pain, palpitations, shortness of breath, or lower extremity edema.    Anxiety/depression: chronic hx of anxiety/depression. Treated for a long time with wellbutrin. Presented recently with some worsening symptoms. Discussed switching from wellbutrin to cymbalta given hx of chronic back pain. He had AE with cymbalta and had difficulty weaning from wellbutrin. He then restarted wellbutrin. At last visit, we decided to add low dose of lexapro. Patient  reports improvement of his symptoms but sometimes feeling numb and spacing out for minutes at a time. Not losing consciousness. He would like to wait a couple of more weeks to see if this continues before making any changes. He denies any other AE of his medications.    No other new complaints today.  Remaining chronic conditions have been reviewed and remain stable. Further detail as stated above.     HM reviewed. Flu shot today.    No recent changes to medical/surgical history.    Current Outpatient Medications on File Prior to Visit   Medication Sig Dispense Refill    aspirin (ECOTRIN) 81 MG EC tablet Take 81 mg by mouth once daily.      atorvastatin (LIPITOR) 40 MG tablet TAKE 1 TABLET DAILY 90 tablet 3    buPROPion (WELLBUTRIN XL) 300 MG 24 hr tablet Take 1 tablet (300 mg total) by mouth once daily. 90 tablet 3    EScitalopram oxalate (LEXAPRO) 5 MG Tab Take 1 tablet (5 mg total) by mouth once daily. 30 tablet 1    famotidine (PEPCID) 20 MG tablet Take 20 mg by mouth every evening.      metoprolol succinate (TOPROL-XL) 25 MG 24 hr tablet TAKE ONE-HALF (1/2) TABLET DAILY 45 tablet 3    morphine (MS CONTIN) 30 MG 12 hr tablet Take 30 mg by mouth 3 (three) times daily.      oxyCODONE-acetaminophen (PERCOCET)  mg per tablet       tamsulosin (FLOMAX) 0.4 mg Cap TAKE 2 CAPSULES EVERY EVENING 180 capsule 3    tiZANidine (ZANAFLEX) 4 MG tablet Take 1 tablet by mouth daily as needed.       No current facility-administered medications on file prior to visit.       Review of Systems   Constitutional:  Negative for chills, diaphoresis, fatigue and fever.   HENT:  Negative for congestion, ear pain, postnasal drip, sinus pain and sore throat.    Eyes:  Negative for pain and redness.   Respiratory:  Negative for cough, chest tightness and shortness of breath.    Cardiovascular:  Negative for chest pain and leg swelling.   Gastrointestinal:  Negative for abdominal pain, constipation, diarrhea, nausea and vomiting.  "  Genitourinary:  Negative for dysuria and hematuria.   Musculoskeletal:  Negative for arthralgias and joint swelling.   Skin:  Negative for rash.   Neurological:  Negative for dizziness, syncope and headaches.   Psychiatric/Behavioral:  Negative for dysphoric mood. The patient is not nervous/anxious.      Vitals:    10/12/22 1506   BP: 93/60   Pulse: 82   Temp: 98.5 °F (36.9 °C)   Weight: 90 kg (198 lb 6.6 oz)   Height: 5' 11" (1.803 m)       Wt Readings from Last 3 Encounters:   10/12/22 90 kg (198 lb 6.6 oz)   09/02/22 88.5 kg (195 lb 1.7 oz)   08/24/22 88.9 kg (196 lb)       Physical Exam  Constitutional:       General: He is not in acute distress.     Appearance: Normal appearance. He is well-developed.   HENT:      Head: Normocephalic and atraumatic.   Eyes:      Conjunctiva/sclera: Conjunctivae normal.   Cardiovascular:      Rate and Rhythm: Normal rate and regular rhythm.      Pulses: Normal pulses.      Heart sounds: Normal heart sounds. No murmur heard.  Pulmonary:      Effort: Pulmonary effort is normal. No respiratory distress.      Breath sounds: Normal breath sounds.   Abdominal:      General: Bowel sounds are normal. There is no distension.      Palpations: Abdomen is soft.      Tenderness: There is no abdominal tenderness.   Musculoskeletal:         General: Normal range of motion.      Cervical back: Normal range of motion and neck supple.   Skin:     General: Skin is warm and dry.      Findings: No rash.   Neurological:      General: No focal deficit present.      Mental Status: He is alert and oriented to person, place, and time.   Psychiatric:         Mood and Affect: Mood normal.         Behavior: Behavior normal.     Health Maintenance   Topic Date Due    Lipid Panel  08/09/2023    High Dose Statin  10/12/2023    Aspirin/Antiplatelet Therapy  10/12/2023    TETANUS VACCINE  10/15/2028    Hepatitis C Screening  Completed    Abdominal Aortic Aneurysm Screening  Completed       "

## 2022-10-13 ENCOUNTER — PATIENT MESSAGE (OUTPATIENT)
Dept: FAMILY MEDICINE | Facility: CLINIC | Age: 72
End: 2022-10-13
Payer: MEDICARE

## 2022-10-13 RX ORDER — LISINOPRIL 2.5 MG/1
2.5 TABLET ORAL DAILY
Qty: 90 TABLET | Refills: 3 | Status: SHIPPED | OUTPATIENT
Start: 2022-10-13 | End: 2024-03-04

## 2022-10-13 NOTE — TELEPHONE ENCOUNTER
I have signed for the following orders AND/OR meds.  Please call the patient and ask the patient to schedule the testing AND/OR inform about any medications that were sent. Medications have been sent to pharmacy listed below      No orders of the defined types were placed in this encounter.      Medications Ordered This Encounter   Medications    lisinopriL (PRINIVIL,ZESTRIL) 2.5 MG tablet     Sig: Take 1 tablet (2.5 mg total) by mouth once daily.     Dispense:  90 tablet     Refill:  3     .         [facility] [place] - [address] [facility]  [address] [facility] [address] [facility] [address]  [place] [address]  Phone: [phone] Fax: [phone]    [facility] - [place] [address]  Phone: [phone] Fax: [phone]    [facility] - [place], [place] - [address] [place] [address]  Phone: [phone] Fax: [phone]    [facility] [place] - [address]  [address]  [place] [address]  Phone: [phone] Fax: [phone]

## 2022-10-18 ENCOUNTER — PATIENT MESSAGE (OUTPATIENT)
Dept: FAMILY MEDICINE | Facility: CLINIC | Age: 72
End: 2022-10-18
Payer: MEDICARE

## 2022-10-18 DIAGNOSIS — I10 PRIMARY HYPERTENSION: Primary | ICD-10-CM

## 2022-10-19 ENCOUNTER — PATIENT MESSAGE (OUTPATIENT)
Dept: SPEECH THERAPY | Facility: HOSPITAL | Age: 72
End: 2022-10-19
Payer: MEDICARE

## 2022-10-20 NOTE — TELEPHONE ENCOUNTER
Please add tsh to upcoming labs in November    I have signed for the following orders AND/OR meds.  Please call the patient and ask the patient to schedule the testing AND/OR inform about any medications that were sent. Medications have been sent to pharmacy listed below      Orders Placed This Encounter   Procedures    TSH     Standing Status:   Future     Standing Expiration Date:   10/20/2023              [facility] - [place] [address]  Phone: [phone] Fax: [phone]    [facility] - [place] [address]  Phone: [phone] Fax: [phone]    [facility] [place] - [facility] [address]  Phone: [phone] Fax: [phone]

## 2022-10-28 ENCOUNTER — PATIENT MESSAGE (OUTPATIENT)
Dept: FAMILY MEDICINE | Facility: CLINIC | Age: 72
End: 2022-10-28
Payer: MEDICARE

## 2022-10-30 ENCOUNTER — PATIENT MESSAGE (OUTPATIENT)
Dept: FAMILY MEDICINE | Facility: CLINIC | Age: 72
End: 2022-10-30
Payer: MEDICARE

## 2022-11-02 ENCOUNTER — OFFICE VISIT (OUTPATIENT)
Dept: OTOLARYNGOLOGY | Facility: CLINIC | Age: 72
End: 2022-11-02
Payer: MEDICARE

## 2022-11-02 ENCOUNTER — LAB VISIT (OUTPATIENT)
Dept: LAB | Facility: HOSPITAL | Age: 72
End: 2022-11-02
Attending: INTERNAL MEDICINE
Payer: MEDICARE

## 2022-11-02 VITALS — WEIGHT: 197.31 LBS | BODY MASS INDEX: 27.62 KG/M2 | HEIGHT: 71 IN

## 2022-11-02 DIAGNOSIS — R49.0 DYSPHONIA: ICD-10-CM

## 2022-11-02 DIAGNOSIS — H92.02 OTALGIA, LEFT: ICD-10-CM

## 2022-11-02 DIAGNOSIS — E83.51 HYPOCALCEMIA: ICD-10-CM

## 2022-11-02 DIAGNOSIS — J38.3 VOCAL FOLD CYST: Primary | ICD-10-CM

## 2022-11-02 DIAGNOSIS — H69.92 ETD (EUSTACHIAN TUBE DYSFUNCTION), LEFT: ICD-10-CM

## 2022-11-02 DIAGNOSIS — D64.9 ANEMIA, UNSPECIFIED TYPE: ICD-10-CM

## 2022-11-02 DIAGNOSIS — E55.9 VITAMIN D INSUFFICIENCY: ICD-10-CM

## 2022-11-02 LAB
25(OH)D3+25(OH)D2 SERPL-MCNC: 28 NG/ML (ref 30–96)
ANION GAP SERPL CALC-SCNC: 9 MMOL/L (ref 8–16)
BASOPHILS # BLD AUTO: 0.04 K/UL (ref 0–0.2)
BASOPHILS NFR BLD: 0.7 % (ref 0–1.9)
BUN SERPL-MCNC: 14 MG/DL (ref 8–23)
CALCIUM SERPL-MCNC: 8.4 MG/DL (ref 8.7–10.5)
CHLORIDE SERPL-SCNC: 105 MMOL/L (ref 95–110)
CO2 SERPL-SCNC: 24 MMOL/L (ref 23–29)
CREAT SERPL-MCNC: 1.1 MG/DL (ref 0.5–1.4)
DIFFERENTIAL METHOD: ABNORMAL
EOSINOPHIL # BLD AUTO: 0.4 K/UL (ref 0–0.5)
EOSINOPHIL NFR BLD: 6.8 % (ref 0–8)
ERYTHROCYTE [DISTWIDTH] IN BLOOD BY AUTOMATED COUNT: 13.1 % (ref 11.5–14.5)
EST. GFR  (NO RACE VARIABLE): >60 ML/MIN/1.73 M^2
GLUCOSE SERPL-MCNC: 84 MG/DL (ref 70–110)
HCT VFR BLD AUTO: 34.7 % (ref 40–54)
HGB BLD-MCNC: 11.2 G/DL (ref 14–18)
IMM GRANULOCYTES # BLD AUTO: 0.04 K/UL (ref 0–0.04)
IMM GRANULOCYTES NFR BLD AUTO: 0.7 % (ref 0–0.5)
LYMPHOCYTES # BLD AUTO: 2.6 K/UL (ref 1–4.8)
LYMPHOCYTES NFR BLD: 43.7 % (ref 18–48)
MCH RBC QN AUTO: 30.6 PG (ref 27–31)
MCHC RBC AUTO-ENTMCNC: 32.3 G/DL (ref 32–36)
MCV RBC AUTO: 95 FL (ref 82–98)
MONOCYTES # BLD AUTO: 0.6 K/UL (ref 0.3–1)
MONOCYTES NFR BLD: 9.3 % (ref 4–15)
NEUTROPHILS # BLD AUTO: 2.3 K/UL (ref 1.8–7.7)
NEUTROPHILS NFR BLD: 38.8 % (ref 38–73)
NRBC BLD-RTO: 0 /100 WBC
PLATELET # BLD AUTO: 186 K/UL (ref 150–450)
PMV BLD AUTO: 9.7 FL (ref 9.2–12.9)
POTASSIUM SERPL-SCNC: 4.3 MMOL/L (ref 3.5–5.1)
PTH-INTACT SERPL-MCNC: 174.9 PG/ML (ref 9–77)
RBC # BLD AUTO: 3.66 M/UL (ref 4.6–6.2)
SODIUM SERPL-SCNC: 138 MMOL/L (ref 136–145)
WBC # BLD AUTO: 6 K/UL (ref 3.9–12.7)

## 2022-11-02 PROCEDURE — 99213 OFFICE O/P EST LOW 20 MIN: CPT | Mod: PBBFAC,PO | Performed by: STUDENT IN AN ORGANIZED HEALTH CARE EDUCATION/TRAINING PROGRAM

## 2022-11-02 PROCEDURE — 99999 PR PBB SHADOW E&M-EST. PATIENT-LVL III: ICD-10-PCS | Mod: PBBFAC,,, | Performed by: STUDENT IN AN ORGANIZED HEALTH CARE EDUCATION/TRAINING PROGRAM

## 2022-11-02 PROCEDURE — 99213 PR OFFICE/OUTPT VISIT, EST, LEVL III, 20-29 MIN: ICD-10-PCS | Mod: S$PBB,,, | Performed by: STUDENT IN AN ORGANIZED HEALTH CARE EDUCATION/TRAINING PROGRAM

## 2022-11-02 PROCEDURE — 80048 BASIC METABOLIC PNL TOTAL CA: CPT | Performed by: INTERNAL MEDICINE

## 2022-11-02 PROCEDURE — 83970 ASSAY OF PARATHORMONE: CPT | Performed by: INTERNAL MEDICINE

## 2022-11-02 PROCEDURE — 85025 COMPLETE CBC W/AUTO DIFF WBC: CPT | Performed by: INTERNAL MEDICINE

## 2022-11-02 PROCEDURE — 99999 PR PBB SHADOW E&M-EST. PATIENT-LVL III: CPT | Mod: PBBFAC,,, | Performed by: STUDENT IN AN ORGANIZED HEALTH CARE EDUCATION/TRAINING PROGRAM

## 2022-11-02 PROCEDURE — 99213 OFFICE O/P EST LOW 20 MIN: CPT | Mod: S$PBB,,, | Performed by: STUDENT IN AN ORGANIZED HEALTH CARE EDUCATION/TRAINING PROGRAM

## 2022-11-02 PROCEDURE — 36415 COLL VENOUS BLD VENIPUNCTURE: CPT | Mod: PO | Performed by: INTERNAL MEDICINE

## 2022-11-02 PROCEDURE — 82306 VITAMIN D 25 HYDROXY: CPT | Performed by: INTERNAL MEDICINE

## 2022-11-02 NOTE — H&P (VIEW-ONLY)
Subjective:       Patient ID: Luigi Murillo is a 71 y.o. male.    Chief Complaint: Follow-up (Vocal cord lesion)    Follow-up     8/22/22  Patient saw me 10 months ago for tinnitus and hearing loss. [employer] . (+)Noise exposure. Patient returns today for dysphonia. Patient denies significant allergic stigmata:  No itchy, red, watery eyes; no itchy, red, watery nose; no excessive sneezing or stuffiness. Patient denies s/s of acute bacterial sinusitis:  No mucopus from nose or throat, no facial swelling/pain, no dental pain, no diminished olfaction/taste, no headaches around the eyes, no sore throat or productive cough. Patient denies s/s of acute bacterial pharyngitis/tonsillitis:  No exudate or marked erythema, no cervical lymphadenopathy or fever.  Patient's only symptoms are dysphonia and globus sensation. Patient reports voice has been rough and hoarse X 18 months. He quit smoking in 2007. Denies smokeless tobacco use. Denies EtOH. Patient reports sometimes hurts to swallow like a cramp.     9/2/22: Patient referred by Jena to evaluate vocal fold lesion. Reports hoarseness for a couple of years, not sure how it started. Daughter told him he sounded hoarse. Not worse. He does have odynophagia, sometimes his ear hurts, today is the left ear. Denies dysphagia, dyspnea. No coughing. Does not feel strained. Smoking history above. No known reflux. Has retired due to sick wife. 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 Does throat clear a lot, has globus sensation. Does feel like he clears it, but comes back. No reflux medicine. Denies any sinus complaints or PND. No weight loss, no neck masses but does sometimes feel like he gets a cramp in his neck and cannot swallow because it hurts, usually the right, eventually goes. Voice never sounds normal, sometimes worse, never better. Has not entirely lost it. No lung disease. No blood thinners.     Drinks 3-4 bottles of water  a day, 1 cup coffee and a couple of diet cokes a day. Minimal greasy, spicy food. Maybe tomato sauce once a week. No chocolate. Has a couple of hours between dinner and bedtime.     11/2/22; Did voice therapy. No real change. Unhappy with his voice. Gets tired with voice. Takes care of his wife full time. No reflux. Also with left ear pain on and off for past few days. Cannot pop but has before.     Review of Systems   Constitutional: Negative.    HENT:  Positive for voice change.         Sensation of lump or swelling in the back of his throat   Eyes: Negative.    Respiratory: Negative.     Cardiovascular: Negative.    Gastrointestinal: Negative.    Musculoskeletal: Negative.    Integumentary:  Negative.   Neurological: Negative.    Hematological: Negative.    Psychiatric/Behavioral: Negative.         Objective:      Physical Exam  Vitals and nursing note reviewed.   Constitutional:       General: He is not in acute distress.     Appearance: He is well-developed. He is not ill-appearing or diaphoretic.   HENT:      Head: Normocephalic and atraumatic.      Right Ear: Hearing, tympanic membrane, ear canal and external ear normal. No middle ear effusion. Tympanic membrane is not erythematous.      Left Ear: Hearing, tympanic membrane, ear canal and external ear normal.  No middle ear effusion. Tympanic membrane is not erythematous.      Nose: Nose normal.      Mouth/Throat:      Pharynx: Uvula midline.   Eyes:      General: Lids are normal. No scleral icterus.        Right eye: No discharge.         Left eye: No discharge.   Neck:      Trachea: Trachea normal. No tracheal deviation.   Cardiovascular:      Rate and Rhythm: Normal rate.   Pulmonary:      Effort: Pulmonary effort is normal. No respiratory distress.      Breath sounds: No stridor. No wheezing.   Musculoskeletal:         General: Normal range of motion.      Cervical back: Normal range of motion and neck supple.   Skin:     General: Skin is warm and dry.       Coloration: Skin is not pale.   Neurological:      Mental Status: He is alert and oriented to person, place, and time.      Coordination: Coordination normal.      Gait: Gait normal.   Psychiatric:         Speech: Speech normal.         Behavior: Behavior normal. Behavior is cooperative.         Thought Content: Thought content normal.         Judgment: Judgment normal.     \            Assessment:   Vocal fold cyst  Dysphonia  ETD/left otalgia        Plan:     Discussed options, voice therapy has not helped.  He would like to proceed with surgery. Risk of scarring and persistent hoarseness. I discussed the risks of microlaryngoscopy including pain, recurrence / persistent, worsening voice, swallows disturbance (typically temporary), injury to dentition, inability to expose the lesion 2/2 anatomy, airway fire (if laser), taste changes/tongue numbness, pneumothorax. Very low suspicion for malignancy. Discussed that he will need voice rest for at least 3 days.      Has stents, sees cardiology yearly Dr. Spain. No blood thinners. Will need clearance.     Flonase BID and pop ears for otalgia and ETD    Wandy Dan MD

## 2022-11-08 ENCOUNTER — ANESTHESIA EVENT (OUTPATIENT)
Dept: SURGERY | Facility: HOSPITAL | Age: 72
End: 2022-11-08
Payer: MEDICARE

## 2022-11-09 ENCOUNTER — HOSPITAL ENCOUNTER (OUTPATIENT)
Facility: HOSPITAL | Age: 72
Discharge: HOME | End: 2022-11-09
Attending: STUDENT IN AN ORGANIZED HEALTH CARE EDUCATION/TRAINING PROGRAM | Admitting: STUDENT IN AN ORGANIZED HEALTH CARE EDUCATION/TRAINING PROGRAM
Payer: MEDICARE

## 2022-11-09 ENCOUNTER — TELEPHONE (OUTPATIENT)
Dept: OTOLARYNGOLOGY | Facility: CLINIC | Age: 72
End: 2022-11-09
Payer: MEDICARE

## 2022-11-09 ENCOUNTER — ANESTHESIA (OUTPATIENT)
Dept: SURGERY | Facility: HOSPITAL | Age: 72
End: 2022-11-09
Payer: MEDICARE

## 2022-11-09 VITALS
WEIGHT: 197 LBS | HEART RATE: 72 BPM | DIASTOLIC BLOOD PRESSURE: 82 MMHG | TEMPERATURE: 98 F | RESPIRATION RATE: 16 BRPM | SYSTOLIC BLOOD PRESSURE: 177 MMHG | OXYGEN SATURATION: 100 % | BODY MASS INDEX: 27.58 KG/M2 | HEIGHT: 71 IN

## 2022-11-09 DIAGNOSIS — J38.3 VOCAL FOLD CYST: Primary | ICD-10-CM

## 2022-11-09 PROCEDURE — D9220A PRA ANESTHESIA: ICD-10-PCS | Mod: CRNA,,, | Performed by: NURSE ANESTHETIST, CERTIFIED REGISTERED

## 2022-11-09 PROCEDURE — 36000709 HC OR TIME LEV III EA ADD 15 MIN: Mod: PO | Performed by: STUDENT IN AN ORGANIZED HEALTH CARE EDUCATION/TRAINING PROGRAM

## 2022-11-09 PROCEDURE — 88305 TISSUE EXAM BY PATHOLOGIST: ICD-10-PCS | Mod: 26,,, | Performed by: PATHOLOGY

## 2022-11-09 PROCEDURE — D9220A PRA ANESTHESIA: ICD-10-PCS | Mod: ANES,,, | Performed by: ANESTHESIOLOGY

## 2022-11-09 PROCEDURE — 36000708 HC OR TIME LEV III 1ST 15 MIN: Mod: PO | Performed by: STUDENT IN AN ORGANIZED HEALTH CARE EDUCATION/TRAINING PROGRAM

## 2022-11-09 PROCEDURE — 31545 PR LARYNGOSCOPY,DIR,OP,EXC TUMR,LCL FLAP: ICD-10-PCS | Mod: LT,,, | Performed by: STUDENT IN AN ORGANIZED HEALTH CARE EDUCATION/TRAINING PROGRAM

## 2022-11-09 PROCEDURE — D9220A PRA ANESTHESIA: Mod: CRNA,,, | Performed by: NURSE ANESTHETIST, CERTIFIED REGISTERED

## 2022-11-09 PROCEDURE — 37000009 HC ANESTHESIA EA ADD 15 MINS: Mod: PO | Performed by: STUDENT IN AN ORGANIZED HEALTH CARE EDUCATION/TRAINING PROGRAM

## 2022-11-09 PROCEDURE — 25000003 PHARM REV CODE 250: Mod: PO | Performed by: STUDENT IN AN ORGANIZED HEALTH CARE EDUCATION/TRAINING PROGRAM

## 2022-11-09 PROCEDURE — D9220A PRA ANESTHESIA: Mod: ANES,,, | Performed by: ANESTHESIOLOGY

## 2022-11-09 PROCEDURE — 37000008 HC ANESTHESIA 1ST 15 MINUTES: Mod: PO | Performed by: STUDENT IN AN ORGANIZED HEALTH CARE EDUCATION/TRAINING PROGRAM

## 2022-11-09 PROCEDURE — 71000015 HC POSTOP RECOV 1ST HR: Mod: PO | Performed by: STUDENT IN AN ORGANIZED HEALTH CARE EDUCATION/TRAINING PROGRAM

## 2022-11-09 PROCEDURE — 88305 TISSUE EXAM BY PATHOLOGIST: CPT | Mod: 26,,, | Performed by: PATHOLOGY

## 2022-11-09 PROCEDURE — 31545 REMOVE VC LESION W/SCOPE: CPT | Mod: LT,,, | Performed by: STUDENT IN AN ORGANIZED HEALTH CARE EDUCATION/TRAINING PROGRAM

## 2022-11-09 PROCEDURE — 63600175 PHARM REV CODE 636 W HCPCS: Mod: PO | Performed by: NURSE ANESTHETIST, CERTIFIED REGISTERED

## 2022-11-09 PROCEDURE — 63600175 PHARM REV CODE 636 W HCPCS: Mod: PO | Performed by: ANESTHESIOLOGY

## 2022-11-09 PROCEDURE — 25000003 PHARM REV CODE 250: Mod: PO | Performed by: NURSE ANESTHETIST, CERTIFIED REGISTERED

## 2022-11-09 PROCEDURE — 71000033 HC RECOVERY, INTIAL HOUR: Mod: PO | Performed by: STUDENT IN AN ORGANIZED HEALTH CARE EDUCATION/TRAINING PROGRAM

## 2022-11-09 PROCEDURE — 88305 TISSUE EXAM BY PATHOLOGIST: CPT | Performed by: PATHOLOGY

## 2022-11-09 RX ORDER — ROCURONIUM BROMIDE 10 MG/ML
INJECTION, SOLUTION INTRAVENOUS
Status: DISCONTINUED | OUTPATIENT
Start: 2022-11-09 | End: 2022-11-09

## 2022-11-09 RX ORDER — PHENYLEPHRINE HYDROCHLORIDE 10 MG/ML
INJECTION INTRAVENOUS
Status: DISCONTINUED | OUTPATIENT
Start: 2022-11-09 | End: 2022-11-09

## 2022-11-09 RX ORDER — ONDANSETRON 2 MG/ML
4 INJECTION INTRAMUSCULAR; INTRAVENOUS ONCE AS NEEDED
Status: DISCONTINUED | OUTPATIENT
Start: 2022-11-09 | End: 2022-11-09 | Stop reason: HOSPADM

## 2022-11-09 RX ORDER — EPHEDRINE SULFATE 50 MG/ML
INJECTION, SOLUTION INTRAVENOUS
Status: DISCONTINUED | OUTPATIENT
Start: 2022-11-09 | End: 2022-11-09

## 2022-11-09 RX ORDER — MIDAZOLAM HYDROCHLORIDE 1 MG/ML
INJECTION INTRAMUSCULAR; INTRAVENOUS
Status: DISCONTINUED | OUTPATIENT
Start: 2022-11-09 | End: 2022-11-09

## 2022-11-09 RX ORDER — ONDANSETRON 2 MG/ML
INJECTION INTRAMUSCULAR; INTRAVENOUS
Status: DISCONTINUED | OUTPATIENT
Start: 2022-11-09 | End: 2022-11-09

## 2022-11-09 RX ORDER — LIDOCAINE HCL/PF 100 MG/5ML
SYRINGE (ML) INTRAVENOUS
Status: DISCONTINUED | OUTPATIENT
Start: 2022-11-09 | End: 2022-11-09

## 2022-11-09 RX ORDER — DEXAMETHASONE SODIUM PHOSPHATE 4 MG/ML
INJECTION, SOLUTION INTRA-ARTICULAR; INTRALESIONAL; INTRAMUSCULAR; INTRAVENOUS; SOFT TISSUE
Status: DISCONTINUED | OUTPATIENT
Start: 2022-11-09 | End: 2022-11-09

## 2022-11-09 RX ORDER — ACETAMINOPHEN 10 MG/ML
INJECTION, SOLUTION INTRAVENOUS
Status: DISCONTINUED | OUTPATIENT
Start: 2022-11-09 | End: 2022-11-09

## 2022-11-09 RX ORDER — LIDOCAINE HYDROCHLORIDE 10 MG/ML
1 INJECTION, SOLUTION EPIDURAL; INFILTRATION; INTRACAUDAL; PERINEURAL ONCE
Status: DISCONTINUED | OUTPATIENT
Start: 2022-11-09 | End: 2022-11-09 | Stop reason: HOSPADM

## 2022-11-09 RX ORDER — PROPOFOL 10 MG/ML
VIAL (ML) INTRAVENOUS
Status: DISCONTINUED | OUTPATIENT
Start: 2022-11-09 | End: 2022-11-09

## 2022-11-09 RX ORDER — OXYMETAZOLINE HCL 0.05 %
SPRAY, NON-AEROSOL (ML) NASAL
Status: DISCONTINUED | OUTPATIENT
Start: 2022-11-09 | End: 2022-11-09 | Stop reason: HOSPADM

## 2022-11-09 RX ORDER — TRAMADOL HYDROCHLORIDE 50 MG/1
50 TABLET ORAL EVERY 6 HOURS PRN
Qty: 12 TABLET | Refills: 0 | Status: SHIPPED | OUTPATIENT
Start: 2022-11-09 | End: 2022-11-12

## 2022-11-09 RX ORDER — FENTANYL CITRATE 50 UG/ML
INJECTION, SOLUTION INTRAMUSCULAR; INTRAVENOUS
Status: DISCONTINUED | OUTPATIENT
Start: 2022-11-09 | End: 2022-11-09

## 2022-11-09 RX ORDER — SODIUM CHLORIDE, SODIUM LACTATE, POTASSIUM CHLORIDE, CALCIUM CHLORIDE 600; 310; 30; 20 MG/100ML; MG/100ML; MG/100ML; MG/100ML
125 INJECTION, SOLUTION INTRAVENOUS CONTINUOUS
Status: DISCONTINUED | OUTPATIENT
Start: 2022-11-09 | End: 2022-11-09 | Stop reason: HOSPADM

## 2022-11-09 RX ORDER — SODIUM CHLORIDE, SODIUM LACTATE, POTASSIUM CHLORIDE, CALCIUM CHLORIDE 600; 310; 30; 20 MG/100ML; MG/100ML; MG/100ML; MG/100ML
INJECTION, SOLUTION INTRAVENOUS CONTINUOUS
Status: DISCONTINUED | OUTPATIENT
Start: 2022-11-09 | End: 2022-11-09 | Stop reason: HOSPADM

## 2022-11-09 RX ORDER — FENTANYL CITRATE 50 UG/ML
25 INJECTION, SOLUTION INTRAMUSCULAR; INTRAVENOUS EVERY 5 MIN PRN
Status: DISCONTINUED | OUTPATIENT
Start: 2022-11-09 | End: 2022-11-09 | Stop reason: HOSPADM

## 2022-11-09 RX ADMIN — EPHEDRINE SULFATE 5 MG: 50 INJECTION INTRAVENOUS at 09:11

## 2022-11-09 RX ADMIN — SODIUM CHLORIDE, SODIUM LACTATE, POTASSIUM CHLORIDE, AND CALCIUM CHLORIDE: .6; .31; .03; .02 INJECTION, SOLUTION INTRAVENOUS at 08:11

## 2022-11-09 RX ADMIN — EPHEDRINE SULFATE 10 MG: 50 INJECTION INTRAVENOUS at 09:11

## 2022-11-09 RX ADMIN — LIDOCAINE HYDROCHLORIDE 100 MG: 20 INJECTION, SOLUTION INTRAVENOUS at 08:11

## 2022-11-09 RX ADMIN — ACETAMINOPHEN 1000 MG: 10 INJECTION, SOLUTION INTRAVENOUS at 08:11

## 2022-11-09 RX ADMIN — ROCURONIUM BROMIDE 10 MG: 10 INJECTION, SOLUTION INTRAVENOUS at 08:11

## 2022-11-09 RX ADMIN — PHENYLEPHRINE HYDROCHLORIDE 100 MCG: 10 INJECTION INTRAVENOUS at 09:11

## 2022-11-09 RX ADMIN — PHENYLEPHRINE HYDROCHLORIDE 100 MCG: 10 INJECTION INTRAVENOUS at 08:11

## 2022-11-09 RX ADMIN — ROCURONIUM BROMIDE 30 MG: 10 INJECTION, SOLUTION INTRAVENOUS at 08:11

## 2022-11-09 RX ADMIN — ROCURONIUM BROMIDE 10 MG: 10 INJECTION, SOLUTION INTRAVENOUS at 09:11

## 2022-11-09 RX ADMIN — MIDAZOLAM HYDROCHLORIDE 2 MG: 1 INJECTION, SOLUTION INTRAMUSCULAR; INTRAVENOUS at 08:11

## 2022-11-09 RX ADMIN — SUGAMMADEX 200 MG: 100 INJECTION, SOLUTION INTRAVENOUS at 09:11

## 2022-11-09 RX ADMIN — PROPOFOL 200 MG: 10 INJECTION, EMULSION INTRAVENOUS at 08:11

## 2022-11-09 RX ADMIN — FENTANYL CITRATE 100 MCG: 50 INJECTION, SOLUTION INTRAMUSCULAR; INTRAVENOUS at 08:11

## 2022-11-09 RX ADMIN — DEXAMETHASONE SODIUM PHOSPHATE 12 MG: 4 INJECTION, SOLUTION INTRAMUSCULAR; INTRAVENOUS at 08:11

## 2022-11-09 RX ADMIN — ONDANSETRON 4 MG: 2 INJECTION, SOLUTION INTRAMUSCULAR; INTRAVENOUS at 09:11

## 2022-11-09 NOTE — OP NOTE
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 Microdirect laryngoscopy with microflap and excision of left vocal fold mass    Attending:  Wandy Dan MD    Assist: none    Anesthesia: General     EBL: < 5 ml    Complications: None    Findings: not well circumscribed soft tissue debris within 1/3-2/3 vocal fold junction. Microflap raised and contents removed in pieces. Flap laid back down with good appearance.             Specimen: left vocal fold mass.     Disposition: Stable, to PACU    Preoperative Indication:   Luigi Murillo is a 71 y.o. male who has been noted to have dysphonia and left vocal fold mass, voice therapy was not helpful, here for excision.       Description of Procedure:  Patient was brought to the operating room and placed on the table in supine position.  Anesthesia was obtained via endotracheal tube.  The eyes were taped shut and a timeout was performed. Goggles were placed on the patient. Bed was rotated 90 degrees.     Tooth guard was placed. Exposure was obtained with an anterior commissure laryngoscope. Afrin pledget was placed over the left vocal fold. Cyst was noted on left mid to anterior vocal fold. Microscope was brought onto the field and view was obtained. Sickle knife was used to incise lateral to the cyst through the lamina propria without injuring the vocal ligament. Blunt dissection was done to raise microflap to free edge of vocal fold revealing not well encapsulated gelatinous appearing debris within the vocal fold. This was removed in several pieces. It was mobile and had to be searched for as it moved within the vocal fold. I believe all contents were removed. I explored with microscope, 0 degree and 70 degree telescope not showing any visible mass within the vocal  fold. I irrigated within the pocket with saline then compressed the space down with afrin soaked pledget. Microflap was rested back down with good appearance of the vocal fold. Procedure was complete at this point. Larynx was suctioned. I viewed all manisha of larynx and pharynx before removed instrumentation noting laceration to right retromolar trigone from suspension. I used silver nitrate to gain hemostasis in this area.       At the end of the procedure, the patient was awakened from anesthesia and transferred to the PACU in good condition.      Wandy Dan MD  Otolaryngology Attending

## 2022-11-09 NOTE — TELEPHONE ENCOUNTER
Pt. Is scheduled for Wednesday November 23 at 11:45----- Message from Wandy Dan MD sent at 11/9/2022 11:16 AM CST -----  Regarding: post op  Post op 2 weeks please

## 2022-11-09 NOTE — ANESTHESIA POSTPROCEDURE EVALUATION
Anesthesia Post Evaluation    Patient: Luigi Murillo    Procedure(s) Performed: Procedure(s) (LRB):  LARYNGOSCOPY (Bilateral)  EXCISION, LESION, VOCAL CORD (Bilateral)    Final Anesthesia Type: general      Patient location during evaluation: PACU  Patient participation: Yes- Able to Participate  Level of consciousness: awake and alert  Post-procedure vital signs: reviewed and stable  Pain management: adequate  Airway patency: patent    PONV status at discharge: No PONV  Anesthetic complications: no      Cardiovascular status: hemodynamically stable  Respiratory status: unassisted and room air  Hydration status: euvolemic  Follow-up not needed.          Vitals Value Taken Time   /99 11/09/22 1100   Temp 36.8 °C (98.2 °F) 11/09/22 1010   Pulse 88 11/09/22 1100   Resp 16 11/09/22 1100   SpO2 100 % 11/09/22 1100         No case tracking events are documented in the log.      Pain/Surya Score: Surya Score: 9 (11/9/2022 11:10 AM)

## 2022-11-09 NOTE — INTERVAL H&P NOTE
The patient has been examined and the H&P has been reviewed:    I concur with the findings and no changes have occurred since H&P was written.    Exam additions:  Cardio: RRR  Respiratory: No increased WOB  Abdomen: soft, NT, ND      Surgery risks, benefits and alternative options discussed and understood by patient/family.          There are no hospital problems to display for this patient.

## 2022-11-09 NOTE — TRANSFER OF CARE
"Anesthesia Transfer of Care Note    Patient: Luigi Murillo    Procedure(s) Performed: Procedure(s) (LRB):  LARYNGOSCOPY (Bilateral)  EXCISION, LESION, VOCAL CORD (Bilateral)    Patient location: PACU    Anesthesia Type: general    Transport from OR: Transported from OR on room air with adequate spontaneous ventilation    Post pain: adequate analgesia    Post assessment: no apparent anesthetic complications and tolerated procedure well    Post vital signs: stable    Level of consciousness: sedated and awake    Nausea/Vomiting: no nausea/vomiting    Complications: none    Transfer of care protocol was followed      Last vitals:   Visit Vitals  BP (!) 178/88   Pulse 77   Temp 36.9 °C (98.4 °F) (Skin)   Resp 17   Ht 5' 11" (1.803 m)   Wt 89.4 kg (197 lb)   SpO2 98%   BMI 27.48 kg/m²     "

## 2022-11-09 NOTE — BRIEF OP NOTE
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 LEFT VOCAL CORD MASS     References:    Click here for ordering Quick Tip   Question Answer Comment   Procedure Type: ENT    Which provider would you like to cc? 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 Activity light for 1 week    Medications:  Reconciled Home Medications:   Current Discharge Medication List        CONTINUE these medications which have NOT CHANGED    Details   atorvastatin  (LIPITOR) 40 MG tablet TAKE 1 TABLET DAILY  Qty: 90 tablet, Refills: 3    Associated Diagnoses: CAD in native artery      buPROPion (WELLBUTRIN XL) 300 MG 24 hr tablet Take 1 tablet (300 mg total) by mouth once daily.  Qty: 90 tablet, Refills: 3    Associated Diagnoses: Depression, major, in remission      EScitalopram oxalate (LEXAPRO) 5 MG Tab Take 1 tablet (5 mg total) by mouth once daily.  Qty: 30 tablet, Refills: 1    Associated Diagnoses: Depression, major, in remission      famotidine (PEPCID) 20 MG tablet Take 20 mg by mouth every evening.      lisinopriL (PRINIVIL,ZESTRIL) 2.5 MG tablet Take 1 tablet (2.5 mg total) by mouth once daily.  Qty: 90 tablet, Refills: 3    Comments: .  Associated Diagnoses: CAD in native artery      metoprolol succinate (TOPROL-XL) 25 MG 24 hr tablet TAKE ONE-HALF (1/2) TABLET DAILY  Qty: 45 tablet, Refills: 3    Associated Diagnoses: CAD in native artery      morphine (MS CONTIN) 30 MG 12 hr tablet Take 30 mg by mouth 3 (three) times daily.    Comments: Quantity prescribed more than 7 day supply? Press F2 and select one:[id number]        oxyCODONE-acetaminophen (PERCOCET)  mg per tablet       tamsulosin (FLOMAX) 0.4 mg Cap TAKE 2 CAPSULES EVERY EVENING  Qty: 180 capsule, Refills: 3    Associated Diagnoses: Benign prostatic hyperplasia (BPH) with straining on urination      aspirin (ECOTRIN) 81 MG EC tablet Take 81 mg by mouth once daily.      tiZANidine (ZANAFLEX) 4 MG tablet Take 1 tablet by mouth daily as needed.           No discharge procedures on file.

## 2022-11-09 NOTE — ANESTHESIA PROCEDURE NOTES
Intubation    Date/Time: 11/9/2022 8:51 AM  Performed by: Ravindra Crews CRNA  Authorized by: Tyron Chakraborty MD     Intubation:     Induction:  Intravenous    Intubated:  Postinduction    Mask Ventilation:  Easy mask    Attempts:  1    Attempted By:  CRNA    Method of Intubation:  Video laryngoscopy    Blade:  Albert 4    Laryngeal View Grade: Grade I - full view of cords      Difficult Airway Encountered?: No      Complications:  None    Airway Device:  Oral endotracheal tube    Airway Device Size:  6.5    Style/Cuff Inflation:  Cuffed (inflated to minimal occlusive pressure)    Tube secured:  21    Secured at:  The teeth    Placement Verified By:  Capnometry    Complicating Factors:  None    Findings Post-Intubation:  BS equal bilateral and atraumatic/condition of teeth unchanged

## 2022-11-09 NOTE — PATIENT INSTRUCTIONS
Post-op Laryngoscopy  Wandy Dan MD  Otolaryngology - [facility] - [phone]    Pain and Activity  Expect sore throat for 3-5 days. Typically you do not require narcotic pain medication, but your provider will provide a prescription if needed.  It is common to cough up a small amount of blood in the first 24-48 hours. This will improve quickly.  Light activity for 7 days, then you can resume your normal activity if you are feeling well.  Strict voice rest 5 days, no whispering, this is even worse for your healing than talking normal volume.     Diet  Soft diet on discharge for 1 week. This includes foods you do not need to chew or only need to chew minimally. Avoid large boluses of food to swallow. You can resume your usual diet following this.     Medication  Give only medications approved by your doctor. Follow directions closely when giving your medications.  Antibiotics are typically not needed following this surgery. Dr. Dan will let you know if this is different.  Take Tylenol or Ibuprofen for pain, alternating one every 3 hours and not exceeding 4 grams of either in a 24 hour period.   Take narcotic as needed for breakthrough pain.      When to Call the Doctor  Mild pain and a slight fever are normal after surgery. Call if you have any of the following:  Fever:   > 101  Trouble breathing  Inability to swallow  Heart palpitations  Bright red bleeding  Any other concerns

## 2022-11-09 NOTE — ANESTHESIA PREPROCEDURE EVALUATION
11/09/2022  Luigi Murillo is a 71 y.o., male.      Pre-op Assessment    I have reviewed the Patient Summary Reports.     I have reviewed the Nursing Notes. I have reviewed the NPO Status.   I have reviewed the Medications.     Review of Systems  Anesthesia Hx:  No problems with previous Anesthesia    Social:  Former Smoker    Hematology/Oncology:  Hematology Normal   Oncology Normal     EENT/Dental:   Dysphonia   Vocal fold cyst   Cardiovascular:   Hypertension CAD   Angina hyperlipidemia    Pulmonary:  Pulmonary Normal    Renal/:   BPH    Musculoskeletal:   Arthritis     Neurological:   Headaches    Psych:   Psychiatric History depression          Physical Exam  General: Well nourished, Cooperative, Alert and Oriented    Airway:  Mallampati: II   Mouth Opening: Normal  TM Distance: Normal  Neck ROM: Normal ROM    Dental:  Intact        Anesthesia Plan  Type of Anesthesia, risks & benefits discussed:    Anesthesia Type: Gen ETT  Intra-op Monitoring Plan: Standard ASA Monitors  Post Op Pain Control Plan: multimodal analgesia and IV/PO Opioids PRN  Induction:  IV  Informed Consent: Informed consent signed with the Patient and all parties understand the risks and agree with anesthesia plan.  All questions answered.   ASA Score: 3  Day of Surgery Review of History & Physical: H&P Update referred to the surgeon/provider.    Ready For Surgery From Anesthesia Perspective.     .

## 2022-11-10 DIAGNOSIS — I20.9 AP (ANGINA PECTORIS): Primary | ICD-10-CM

## 2022-11-10 RX ORDER — RANOLAZINE 500 MG/1
500 TABLET, EXTENDED RELEASE ORAL 2 TIMES DAILY
Qty: 180 TABLET | Refills: 3 | Status: SHIPPED | OUTPATIENT
Start: 2022-11-10 | End: 2024-03-04

## 2022-11-10 NOTE — TELEPHONE ENCOUNTER
----- Message from Tamiko Weeks sent at 11/10/2022  2:22 PM CST -----  Type:  Pharmacy Calling to Clarify an RX    Name of Caller:pam  [facility]   Prescription Name:ranolazine 500 mg er tablet  What do they need to clarify?:90 day approval  Best Call Back Number:[phone]  Additional Information: fax [phone]

## 2022-11-14 NOTE — PROGRESS NOTES
Results have been released via [employer]. Please verify that these have been viewed by patient. If not, please call patient with results.    I have sent a message to them with the following interpretation (see below).    I have reviewed your recent results.    Vitamin D is still low. Have you been taking vitamin d3 1000 units daily? If so, I would increase to 2000 units daily.    I would also recommend taking a calcium supplement, 1200 mg daily.    Please do not hesitate to call or message with any additional questions or concerns.    Iveth Stafford MD

## 2022-11-17 LAB
FINAL PATHOLOGIC DIAGNOSIS: NORMAL
GROSS: NORMAL
Lab: NORMAL
MICROSCOPIC EXAM: NORMAL

## 2022-11-23 ENCOUNTER — OFFICE VISIT (OUTPATIENT)
Dept: OTOLARYNGOLOGY | Facility: CLINIC | Age: 72
End: 2022-11-23
Payer: MEDICARE

## 2022-11-23 VITALS — WEIGHT: 195.13 LBS | BODY MASS INDEX: 27.32 KG/M2 | HEIGHT: 71 IN

## 2022-11-23 DIAGNOSIS — R49.0 DYSPHONIA: Primary | ICD-10-CM

## 2022-11-23 DIAGNOSIS — J38.3 VOCAL FOLD CYST: ICD-10-CM

## 2022-11-23 PROCEDURE — 99999 PR PBB SHADOW E&M-EST. PATIENT-LVL III: ICD-10-PCS | Mod: PBBFAC,,, | Performed by: STUDENT IN AN ORGANIZED HEALTH CARE EDUCATION/TRAINING PROGRAM

## 2022-11-23 PROCEDURE — 31575 PR LARYNGOSCOPY, FLEXIBLE; DIAGNOSTIC: ICD-10-PCS | Mod: S$PBB,,, | Performed by: STUDENT IN AN ORGANIZED HEALTH CARE EDUCATION/TRAINING PROGRAM

## 2022-11-23 PROCEDURE — 31575 DIAGNOSTIC LARYNGOSCOPY: CPT | Mod: PBBFAC,PO | Performed by: STUDENT IN AN ORGANIZED HEALTH CARE EDUCATION/TRAINING PROGRAM

## 2022-11-23 PROCEDURE — 99024 POSTOP FOLLOW-UP VISIT: CPT | Mod: POP,,, | Performed by: STUDENT IN AN ORGANIZED HEALTH CARE EDUCATION/TRAINING PROGRAM

## 2022-11-23 PROCEDURE — 99024 PR POST-OP FOLLOW-UP VISIT: ICD-10-PCS | Mod: POP,,, | Performed by: STUDENT IN AN ORGANIZED HEALTH CARE EDUCATION/TRAINING PROGRAM

## 2022-11-23 PROCEDURE — 31575 DIAGNOSTIC LARYNGOSCOPY: CPT | Mod: S$PBB,,, | Performed by: STUDENT IN AN ORGANIZED HEALTH CARE EDUCATION/TRAINING PROGRAM

## 2022-11-23 PROCEDURE — 99213 OFFICE O/P EST LOW 20 MIN: CPT | Mod: PBBFAC,PO | Performed by: STUDENT IN AN ORGANIZED HEALTH CARE EDUCATION/TRAINING PROGRAM

## 2022-11-23 PROCEDURE — 99999 PR PBB SHADOW E&M-EST. PATIENT-LVL III: CPT | Mod: PBBFAC,,, | Performed by: STUDENT IN AN ORGANIZED HEALTH CARE EDUCATION/TRAINING PROGRAM

## 2022-11-23 RX ORDER — ZOLPIDEM TARTRATE 5 MG/1
5 TABLET ORAL NIGHTLY PRN
COMMUNITY
Start: 2022-11-21 | End: 2023-08-31

## 2022-11-23 NOTE — PROGRESS NOTES
ENT POST OP    11/9/22: left vocal fold cyst removal.   Path Clinical impression of a vocal fold cyst is noticed. This may represent   detached strips of a benign simple cyst. There is no evidence of dysplasia or   malignancy.   Oral contamination present.      S: Voice better, still hoarse, swallowing ok. Pain resolved. Did not successfully voice rest.     Procedure: Flexible laryngoscopy  Indications: post op vocal surgery  Verbal consent obtained  Anesthesia: lidocaine and phenylephrine nasal spray  Procedure: Scope was passed into right or left nare, to nasopharynx and down to visualize the glottis. Findings below. Patient tolerated procedure well.     - Nose WNL  - Nasopharynx- WNL, no masses  - Oropharynx- BOT symmetric, no masses  - Hypopharynx and piriform sinuses- no masses on trumpet maneuver  - Supraglottis- Arytenoids intact, no masses, not edematous or erythematous  - Glottis- Bilateral vocal folds intact with full motion, no masses, mucosal wave decreased on left, inflammation present, scarring of cord, but decent fullness  - Glottic closure- complete      A/P:   Let heal longer, suspect improvement in voice over time.   Can do voice therapy if not better.   He would like to contact if needed.     aWndy Dan MD

## 2022-11-30 DIAGNOSIS — F32.5 DEPRESSION, MAJOR, IN REMISSION: ICD-10-CM

## 2022-11-30 NOTE — TELEPHONE ENCOUNTER
No new care gaps identified.  [facility] Embedded Care Gaps. Reference number: [id number]. 11/30/2022   4:05:57 PM CST

## 2022-12-01 RX ORDER — ESCITALOPRAM OXALATE 5 MG/1
5 TABLET ORAL DAILY
Qty: 90 TABLET | Refills: 3 | Status: SHIPPED | OUTPATIENT
Start: 2022-12-01 | End: 2023-01-31

## 2022-12-01 NOTE — TELEPHONE ENCOUNTER
Refill Decision Note   Luigi Chidi  is requesting a refill authorization.  Brief Assessment and Rationale for Refill:  Approve     Medication Therapy Plan:       Medication Reconciliation Completed: No   Comments:     No Care Gaps recommended.     Note composed:8:43 AM 12/01/2022

## 2022-12-27 ENCOUNTER — TELEPHONE (OUTPATIENT)
Dept: ADMINISTRATIVE | Facility: HOSPITAL | Age: 72
End: 2022-12-27
Payer: MEDICARE

## 2023-01-31 ENCOUNTER — OFFICE VISIT (OUTPATIENT)
Dept: PRIMARY CARE CLINIC | Facility: CLINIC | Age: 73
End: 2023-01-31
Payer: MEDICARE

## 2023-01-31 VITALS
TEMPERATURE: 98 F | HEIGHT: 71 IN | BODY MASS INDEX: 26.67 KG/M2 | HEART RATE: 96 BPM | DIASTOLIC BLOOD PRESSURE: 88 MMHG | WEIGHT: 190.5 LBS | SYSTOLIC BLOOD PRESSURE: 138 MMHG | OXYGEN SATURATION: 99 %

## 2023-01-31 DIAGNOSIS — E55.9 VITAMIN D DEFICIENCY: ICD-10-CM

## 2023-01-31 DIAGNOSIS — E83.51 HYPOCALCEMIA: ICD-10-CM

## 2023-01-31 DIAGNOSIS — I25.10 CORONARY ARTERY DISEASE INVOLVING NATIVE CORONARY ARTERY OF NATIVE HEART WITHOUT ANGINA PECTORIS: ICD-10-CM

## 2023-01-31 DIAGNOSIS — N18.31 STAGE 3A CHRONIC KIDNEY DISEASE: ICD-10-CM

## 2023-01-31 DIAGNOSIS — F32.5 DEPRESSION, MAJOR, IN REMISSION: Primary | ICD-10-CM

## 2023-01-31 DIAGNOSIS — E78.5 HYPERLIPIDEMIA WITH TARGET LDL LESS THAN 100: ICD-10-CM

## 2023-01-31 DIAGNOSIS — I10 PRIMARY HYPERTENSION: ICD-10-CM

## 2023-01-31 DIAGNOSIS — N25.81 SECONDARY HYPERPARATHYROIDISM: ICD-10-CM

## 2023-01-31 PROCEDURE — 99999 PR PBB SHADOW E&M-EST. PATIENT-LVL IV: ICD-10-PCS | Mod: PBBFAC,,, | Performed by: INTERNAL MEDICINE

## 2023-01-31 PROCEDURE — 99999 PR PBB SHADOW E&M-EST. PATIENT-LVL IV: CPT | Mod: PBBFAC,,, | Performed by: INTERNAL MEDICINE

## 2023-01-31 PROCEDURE — 99214 OFFICE O/P EST MOD 30 MIN: CPT | Mod: PBBFAC,PN | Performed by: INTERNAL MEDICINE

## 2023-01-31 PROCEDURE — 99214 OFFICE O/P EST MOD 30 MIN: CPT | Mod: S$PBB,,, | Performed by: INTERNAL MEDICINE

## 2023-01-31 PROCEDURE — 99214 PR OFFICE/OUTPT VISIT, EST, LEVL IV, 30-39 MIN: ICD-10-PCS | Mod: S$PBB,,, | Performed by: INTERNAL MEDICINE

## 2023-01-31 RX ORDER — SERTRALINE HYDROCHLORIDE 50 MG/1
50 TABLET, FILM COATED ORAL DAILY
Qty: 30 TABLET | Refills: 1 | Status: SHIPPED | OUTPATIENT
Start: 2023-01-31 | End: 2023-02-28

## 2023-01-31 RX ORDER — BUPROPION HYDROCHLORIDE 150 MG/1
150 TABLET ORAL DAILY
Qty: 30 TABLET | Refills: 1 | Status: SHIPPED | OUTPATIENT
Start: 2023-01-31 | End: 2023-02-28

## 2023-01-31 NOTE — PROGRESS NOTES
Assessment/Plan:    Problem List Items Addressed This Visit          Psychiatric    Depression, major, in remission - Primary    Overview     -currently on wellbutrin  mg which he has been on chronically  -tried to wean from wellbutrin and start cymbalta instead but had withdrawal symptoms and AE with cymbalta  -continued on Wellbutrin  mg but added lexapro 5 mg QD but made him feel numb emotionally  -discussed start zoloft instead but also will decrease wellbutrin to 150 mg QD  -discussed risk of discontinuing this medication without tapering  -patient was educated, advised of side effects, and all questions were answered. Patient voiced understanding  -patient will follow up routinely and notify us if having any side effects or worsening or persistent symptoms. ER precautions were given.         Relevant Medications    sertraline (ZOLOFT) 50 MG tablet    buPROPion (WELLBUTRIN XL) 150 MG TB24 tablet       Cardiac/Vascular    CAD (coronary artery disease)    Overview     -S/p PCI x 2  -also with hx of stable angina, on ranexa  -followed by Dr. Barros  -on ASA/statin and metoprolol         Hyperlipidemia with target LDL less than 100    Overview     Hyperlipidemia Medications               atorvastatin (LIPITOR) 40 MG tablet TAKE 1 TABLET DAILY   -chronic condition. Currently stable.    -reports compliance with hyperlipidemia treatment as prescribed  -denies any known adverse effects of medications  -most recent labs listed below; due for repeat labs  Lab Results   Component Value Date    CHOL 149 08/09/2022     Lab Results   Component Value Date    HDL 45 08/09/2022     Lab Results   Component Value Date    LDLCALC 74.2 08/09/2022     Lab Results   Component Value Date    TRIG 149 08/09/2022     Lab Results   Component Value Date    ALT 22 08/09/2022    ALT 22 08/09/2022    AST 19 08/09/2022    AST 19 08/09/2022    ALKPHOS 70 08/09/2022    ALKPHOS 70 08/09/2022    BILITOT 0.3 08/09/2022    BILITOT 0.3  08/09/2022            Hypertension    Overview     Hypertension Medications               lisinopriL (PRINIVIL,ZESTRIL) 2.5 MG tablet Take 1 tablet (2.5 mg total) by mouth once daily.    metoprolol succinate (TOPROL-XL) 25 MG 24 hr tablet TAKE ONE-HALF (1/2) TABLET DAILY   -at goal today  -continue lifestyle modification with low sodium diet and exercise   -discussed hypertension disease course and importance of treating high blood pressure  -patient understood and advised of risk of untreated blood pressure.  ER precautions were given   for symptoms of hypertensive urgency and emergency.             Renal/    Stage 3a chronic kidney disease    Overview     -labs remain stable  -renally dose medication  -avoid nephrotoxic agents, including NSAIDs            Endocrine    Secondary hyperparathyroidism    Relevant Orders    Vitamin D    PTH, Intact    Vitamin D deficiency    Relevant Orders    Vitamin D    PTH, Intact     Other Visit Diagnoses       Hypocalcemia        Relevant Orders    Renal Function Panel            Follow up in about 4 weeks (around 2/28/2023).    Iveth Stafford MD  _____________________________________________________________________________________________________________________________________________________    CC: follow up of chronic medical conditions     HPI:    Patient is in clinic today as an established patient.    Depression/anxiety: chronic history but with recent worsening. Reports feeling fatigue, down and depressed mood often. Denies SI/HI. Worsening symptoms mostly surrounding his wife's health issues. There is also a concern about her being over medicated from pain management treatment. She is a patient of mine as well so we discussed trying to get her in for an appt to discuss this. As for treating his depression, he has been on wellbutrin chronically but has tried adding different medications recently but all with AE. He remains on the wellbutrin currently but is open to  trying something else to help with symptoms.     HTN: The patient is currently being treated for essential hypertension. This condition is chronic and stable. The patient is tolerating their medication well with good compliance.  Denies any adverse effects of medications.  Counseling was offered regarding low sodium diet.  The patient has a reduced salt intake. Routine exercise recommended. The patient denies headache, vision changes, chest pain, palpitations, shortness of breath, or lower extremity edema.    No other new complaints today.  Remaining chronic conditions have been reviewed and remain stable. Further detail as stated above.     HM reviewed. Due for repeat c-scope.      Current Outpatient Medications on File Prior to Visit   Medication Sig Dispense Refill    aspirin (ECOTRIN) 81 MG EC tablet Take 81 mg by mouth once daily.      atorvastatin (LIPITOR) 40 MG tablet TAKE 1 TABLET DAILY 90 tablet 3    famotidine (PEPCID) 20 MG tablet Take 20 mg by mouth every evening.      lisinopriL (PRINIVIL,ZESTRIL) 2.5 MG tablet Take 1 tablet (2.5 mg total) by mouth once daily. 90 tablet 3    metoprolol succinate (TOPROL-XL) 25 MG 24 hr tablet TAKE ONE-HALF (1/2) TABLET DAILY 45 tablet 3    morphine (MS CONTIN) 30 MG 12 hr tablet Take 30 mg by mouth 3 (three) times daily.      oxyCODONE-acetaminophen (PERCOCET)  mg per tablet       ranolazine (RANEXA) 500 MG Tb12 Take 1 tablet (500 mg total) by mouth 2 (two) times daily. 180 tablet 3    tamsulosin (FLOMAX) 0.4 mg Cap TAKE 2 CAPSULES EVERY EVENING 180 capsule 3    tiZANidine (ZANAFLEX) 4 MG tablet Take 1 tablet by mouth daily as needed.      zolpidem (AMBIEN) 5 MG Tab Take 5 mg by mouth nightly as needed.       No current facility-administered medications on file prior to visit.       Review of Systems    Vitals:    01/31/23 0904 01/31/23 0943   BP: (!) 155/93 138/88   Pulse: 96    Temp: 98.3 °F (36.8 °C)    SpO2: 99%    Weight: 86.4 kg (190 lb 7.6 oz)    Height:  "5' 11" (1.803 m)        Wt Readings from Last 3 Encounters:   01/31/23 86.4 kg (190 lb 7.6 oz)   11/23/22 88.5 kg (195 lb 1.7 oz)   11/07/22 89.4 kg (197 lb)       Physical Exam    Health Maintenance   Topic Date Due    Lipid Panel  08/09/2023    High Dose Statin  01/31/2024    Aspirin/Antiplatelet Therapy  01/31/2024    TETANUS VACCINE  10/15/2028    Hepatitis C Screening  Completed    Abdominal Aortic Aneurysm Screening  Completed     "

## 2023-01-31 NOTE — Clinical Note
CERTIFICATE OF WORK    March 21, 2022       Re:   Dennise Quesada   [address] [facility] [address]            To Whom It May Concern,    This is to certify that Dennise Quesada was under my care on 3/21/2022. Please excuse this patient for work absence from 3/20/2022 thru 3/23/2022 .     If you have any further questions, please feel free to contact us at the contact information below.     REMARKS: NONE    SIGNATURE:___________________________________________,   3/21/2022      Cory Nails MD        [address] [id number] [phone]    Please add vit d and pth to labs 3/3.

## 2023-02-13 ENCOUNTER — PATIENT MESSAGE (OUTPATIENT)
Dept: FAMILY MEDICINE | Facility: CLINIC | Age: 73
End: 2023-02-13
Payer: MEDICARE

## 2023-02-26 PROBLEM — N18.31 STAGE 3A CHRONIC KIDNEY DISEASE: Status: ACTIVE | Noted: 2023-02-26

## 2023-02-26 PROBLEM — E55.9 VITAMIN D DEFICIENCY: Status: ACTIVE | Noted: 2023-02-26

## 2023-02-26 PROBLEM — N25.81 SECONDARY HYPERPARATHYROIDISM: Status: ACTIVE | Noted: 2023-02-26

## 2023-03-03 ENCOUNTER — OFFICE VISIT (OUTPATIENT)
Dept: FAMILY MEDICINE | Facility: CLINIC | Age: 73
End: 2023-03-03
Payer: MEDICARE

## 2023-03-03 ENCOUNTER — LAB VISIT (OUTPATIENT)
Dept: LAB | Facility: HOSPITAL | Age: 73
End: 2023-03-03
Attending: INTERNAL MEDICINE
Payer: MEDICARE

## 2023-03-03 VITALS
HEIGHT: 71 IN | SYSTOLIC BLOOD PRESSURE: 116 MMHG | BODY MASS INDEX: 27.02 KG/M2 | DIASTOLIC BLOOD PRESSURE: 67 MMHG | WEIGHT: 193 LBS | HEART RATE: 75 BPM | TEMPERATURE: 98 F

## 2023-03-03 DIAGNOSIS — E83.51 HYPOCALCEMIA: ICD-10-CM

## 2023-03-03 DIAGNOSIS — E55.9 VITAMIN D DEFICIENCY: ICD-10-CM

## 2023-03-03 DIAGNOSIS — F32.5 DEPRESSION, MAJOR, IN REMISSION: Primary | ICD-10-CM

## 2023-03-03 DIAGNOSIS — N25.81 SECONDARY HYPERPARATHYROIDISM: ICD-10-CM

## 2023-03-03 PROCEDURE — 99213 PR OFFICE/OUTPT VISIT, EST, LEVL III, 20-29 MIN: ICD-10-PCS | Mod: S$PBB,,, | Performed by: INTERNAL MEDICINE

## 2023-03-03 PROCEDURE — 99214 OFFICE O/P EST MOD 30 MIN: CPT | Mod: PBBFAC,PO | Performed by: INTERNAL MEDICINE

## 2023-03-03 PROCEDURE — 83970 ASSAY OF PARATHORMONE: CPT | Performed by: INTERNAL MEDICINE

## 2023-03-03 PROCEDURE — 99999 PR PBB SHADOW E&M-EST. PATIENT-LVL IV: ICD-10-PCS | Mod: PBBFAC,,, | Performed by: INTERNAL MEDICINE

## 2023-03-03 PROCEDURE — 99999 PR PBB SHADOW E&M-EST. PATIENT-LVL IV: CPT | Mod: PBBFAC,,, | Performed by: INTERNAL MEDICINE

## 2023-03-03 PROCEDURE — 80069 RENAL FUNCTION PANEL: CPT | Performed by: INTERNAL MEDICINE

## 2023-03-03 PROCEDURE — 82306 VITAMIN D 25 HYDROXY: CPT | Performed by: INTERNAL MEDICINE

## 2023-03-03 PROCEDURE — 99213 OFFICE O/P EST LOW 20 MIN: CPT | Mod: S$PBB,,, | Performed by: INTERNAL MEDICINE

## 2023-03-03 PROCEDURE — 36415 COLL VENOUS BLD VENIPUNCTURE: CPT | Mod: PO | Performed by: INTERNAL MEDICINE

## 2023-03-03 NOTE — PROGRESS NOTES
Assessment/Plan:    Problem List Items Addressed This Visit          Psychiatric    Depression, major, in remission - Primary    Overview     -recently presented with symptoms of worsening depression  -previously on wellbutrin  mg   -in the past, tried to wean from wellbutrin and start cymbalta instead but had withdrawal symptoms and AE with cymbalta  -continued on Wellbutrin  mg but added lexapro 5 mg QD but made him feel numb emotionally  -last visit, started zoloft 50 mg and decreased wellbutrin to 150 mg QD  -since then, reports much improvement in symptoms  -denies AE with zoloft or withdrawal symptoms with lower dose of wellbutrin  -discussed risk of discontinuing this medication without tapering  -patient will follow up routinely and notify us if having any side effects or worsening or persistent symptoms. ER precautions were given.            Follow up in about 6 months (around 9/3/2023) for in office f/u me.    Iveth Stafford MD  _____________________________________________________________________________________________________________________________________________________    CC: depression follow up    HPI:    Patient is in clinic today as an established patient.    Patient with a chronic history but presented last visit with recent worsening of symptoms. He was reporting feeling fatigued, down and depressed mood often. Denied SI/HI. Worsening symptoms mostly surrounding his wife's health issues.  As for treating his depression, he had been on wellbutrin chronically but had tried adding different medications recently but all with AE. He was started on zoloft at last visit and his chronic dose of wellbutrin was decreased. Since that time, he has noticed significant improvement in symptoms. His wife is also doing better which has helped with his stress level. He would like to continue with current doses of medications. Continues to deny SI/HI. Denies any AE of the medication.     No other  new complaints today.      Current Outpatient Medications on File Prior to Visit   Medication Sig Dispense Refill    aspirin (ECOTRIN) 81 MG EC tablet Take 81 mg by mouth once daily.      atorvastatin (LIPITOR) 40 MG tablet TAKE 1 TABLET DAILY 90 tablet 3    lisinopriL (PRINIVIL,ZESTRIL) 2.5 MG tablet Take 1 tablet (2.5 mg total) by mouth once daily. 90 tablet 3    metoprolol succinate (TOPROL-XL) 25 MG 24 hr tablet TAKE ONE-HALF (1/2) TABLET DAILY 45 tablet 3    morphine (MS CONTIN) 30 MG 12 hr tablet Take 30 mg by mouth 3 (three) times daily.      oxyCODONE-acetaminophen (PERCOCET)  mg per tablet       ranolazine (RANEXA) 500 MG Tb12 Take 1 tablet (500 mg total) by mouth 2 (two) times daily. 180 tablet 3    tamsulosin (FLOMAX) 0.4 mg Cap TAKE 2 CAPSULES EVERY EVENING 180 capsule 3    tiZANidine (ZANAFLEX) 4 MG tablet Take 1 tablet by mouth daily as needed.      zolpidem (AMBIEN) 5 MG Tab Take 5 mg by mouth nightly as needed.      famotidine (PEPCID) 20 MG tablet Take 20 mg by mouth every evening.       No current facility-administered medications on file prior to visit.       Review of Systems   Constitutional:  Negative for chills, diaphoresis, fatigue and fever.   HENT:  Negative for congestion, ear pain, postnasal drip, sinus pain and sore throat.    Eyes:  Negative for pain and redness.   Respiratory:  Negative for cough, chest tightness and shortness of breath.    Cardiovascular:  Negative for chest pain and leg swelling.   Gastrointestinal:  Negative for abdominal pain, constipation, diarrhea, nausea and vomiting.   Genitourinary:  Negative for dysuria and hematuria.   Musculoskeletal:  Negative for arthralgias and joint swelling.   Skin:  Negative for rash.   Neurological:  Negative for dizziness, syncope and headaches.   Psychiatric/Behavioral:  Negative for dysphoric mood, self-injury and suicidal ideas. The patient is not nervous/anxious.      Vitals:    03/03/23 0923   BP: 116/67   Pulse: 75  "  Temp: 97.9 °F (36.6 °C)   Weight: 87.5 kg (193 lb)   Height: 5' 11" (1.803 m)       Wt Readings from Last 3 Encounters:   03/03/23 87.5 kg (193 lb)   01/31/23 86.4 kg (190 lb 7.6 oz)   11/23/22 88.5 kg (195 lb 1.7 oz)       Physical Exam  Constitutional:       General: He is not in acute distress.     Appearance: Normal appearance. He is well-developed.   HENT:      Head: Normocephalic and atraumatic.   Eyes:      Conjunctiva/sclera: Conjunctivae normal.   Cardiovascular:      Rate and Rhythm: Normal rate and regular rhythm.      Pulses: Normal pulses.      Heart sounds: Normal heart sounds. No murmur heard.  Pulmonary:      Effort: Pulmonary effort is normal. No respiratory distress.      Breath sounds: Normal breath sounds.   Abdominal:      General: Bowel sounds are normal. There is no distension.      Palpations: Abdomen is soft.      Tenderness: There is no abdominal tenderness.   Musculoskeletal:         General: Normal range of motion.      Cervical back: Normal range of motion and neck supple.   Skin:     General: Skin is warm and dry.      Findings: No rash.   Neurological:      General: No focal deficit present.      Mental Status: He is alert and oriented to person, place, and time.   Psychiatric:         Mood and Affect: Mood normal.         Behavior: Behavior normal.         Thought Content: Thought content normal.         Judgment: Judgment normal.       Health Maintenance   Topic Date Due    Lipid Panel  08/09/2023    High Dose Statin  03/03/2024    Aspirin/Antiplatelet Therapy  03/03/2024    TETANUS VACCINE  10/15/2028    Hepatitis C Screening  Completed    Abdominal Aortic Aneurysm Screening  Completed     "

## 2023-03-04 LAB
25(OH)D3+25(OH)D2 SERPL-MCNC: 36 NG/ML (ref 30–96)
ALBUMIN SERPL BCP-MCNC: 4 G/DL (ref 3.5–5.2)
ANION GAP SERPL CALC-SCNC: 6 MMOL/L (ref 8–16)
BUN SERPL-MCNC: 20 MG/DL (ref 8–23)
CALCIUM SERPL-MCNC: 9.1 MG/DL (ref 8.7–10.5)
CHLORIDE SERPL-SCNC: 104 MMOL/L (ref 95–110)
CO2 SERPL-SCNC: 28 MMOL/L (ref 23–29)
CREAT SERPL-MCNC: 1.3 MG/DL (ref 0.5–1.4)
EST. GFR  (NO RACE VARIABLE): 59.1 ML/MIN/1.73 M^2
GLUCOSE SERPL-MCNC: 84 MG/DL (ref 70–110)
PHOSPHATE SERPL-MCNC: 3.1 MG/DL (ref 2.7–4.5)
POTASSIUM SERPL-SCNC: 4.9 MMOL/L (ref 3.5–5.1)
PTH-INTACT SERPL-MCNC: 123.5 PG/ML (ref 9–77)
SODIUM SERPL-SCNC: 138 MMOL/L (ref 136–145)

## 2023-03-06 NOTE — PROGRESS NOTES
Results have been released via [employer]. Please verify that these have been viewed by patient. If not, please call patient with results.      I have sent a message to them with the following interpretation (see below).    I have reviewed your recent results.    Vitamin d and calcium now in normal range and PTH did improve as well.    Please do not hesitate to call or message with any additional questions or concerns.    Iveth Stafford MD

## 2023-03-22 ENCOUNTER — PATIENT MESSAGE (OUTPATIENT)
Dept: FAMILY MEDICINE | Facility: CLINIC | Age: 73
End: 2023-03-22
Payer: MEDICARE

## 2023-03-22 DIAGNOSIS — F32.5 DEPRESSION, MAJOR, IN REMISSION: ICD-10-CM

## 2023-03-22 RX ORDER — SERTRALINE HYDROCHLORIDE 50 MG/1
50 TABLET, FILM COATED ORAL DAILY
Qty: 90 TABLET | Refills: 3 | Status: SHIPPED | OUTPATIENT
Start: 2023-03-22 | End: 2024-02-01

## 2023-03-22 RX ORDER — BUPROPION HYDROCHLORIDE 150 MG/1
150 TABLET ORAL DAILY
Qty: 90 TABLET | Refills: 3 | Status: SHIPPED | OUTPATIENT
Start: 2023-03-22 | End: 2023-08-07 | Stop reason: SDUPTHER

## 2023-03-22 NOTE — TELEPHONE ENCOUNTER
No new care gaps identified.  [facility] Embedded Care Gaps. Reference number: [id number]. 3/22/2023   11:17:44 AM JARETTT

## 2023-04-04 ENCOUNTER — OFFICE VISIT (OUTPATIENT)
Dept: CARDIOLOGY | Facility: CLINIC | Age: 73
End: 2023-04-04
Payer: MEDICARE

## 2023-04-04 VITALS
DIASTOLIC BLOOD PRESSURE: 85 MMHG | SYSTOLIC BLOOD PRESSURE: 140 MMHG | HEIGHT: 71 IN | HEART RATE: 79 BPM | BODY MASS INDEX: 28.11 KG/M2 | WEIGHT: 200.81 LBS

## 2023-04-04 DIAGNOSIS — R07.2 PRECORDIAL PAIN: ICD-10-CM

## 2023-04-04 DIAGNOSIS — I25.10 CORONARY ARTERY DISEASE INVOLVING NATIVE CORONARY ARTERY OF NATIVE HEART WITHOUT ANGINA PECTORIS: Primary | ICD-10-CM

## 2023-04-04 DIAGNOSIS — Z95.5 STATUS POST CORONARY ARTERY STENT PLACEMENT: ICD-10-CM

## 2023-04-04 DIAGNOSIS — I10 PRIMARY HYPERTENSION: ICD-10-CM

## 2023-04-04 PROCEDURE — 99213 OFFICE O/P EST LOW 20 MIN: CPT | Mod: PBBFAC,PO | Performed by: INTERNAL MEDICINE

## 2023-04-04 PROCEDURE — 99999 PR PBB SHADOW E&M-EST. PATIENT-LVL III: ICD-10-PCS | Mod: PBBFAC,,, | Performed by: INTERNAL MEDICINE

## 2023-04-04 PROCEDURE — 99214 OFFICE O/P EST MOD 30 MIN: CPT | Mod: S$PBB,,, | Performed by: INTERNAL MEDICINE

## 2023-04-04 PROCEDURE — 99999 PR PBB SHADOW E&M-EST. PATIENT-LVL III: CPT | Mod: PBBFAC,,, | Performed by: INTERNAL MEDICINE

## 2023-04-04 PROCEDURE — 99214 PR OFFICE/OUTPT VISIT, EST, LEVL IV, 30-39 MIN: ICD-10-PCS | Mod: S$PBB,,, | Performed by: INTERNAL MEDICINE

## 2023-04-04 RX ORDER — CLOPIDOGREL BISULFATE 75 MG/1
300 TABLET ORAL ONCE
Status: CANCELLED | OUTPATIENT
Start: 2023-04-04 | End: 2023-04-04

## 2023-04-04 RX ORDER — NAPROXEN SODIUM 220 MG/1
81 TABLET, FILM COATED ORAL ONCE
Status: CANCELLED | OUTPATIENT
Start: 2023-04-04 | End: 2023-04-04

## 2023-04-04 RX ORDER — SODIUM CHLORIDE 9 MG/ML
INJECTION, SOLUTION INTRAVENOUS ONCE
Status: CANCELLED | OUTPATIENT
Start: 2023-04-04 | End: 2023-04-04

## 2023-04-04 NOTE — PATIENT INSTRUCTIONS
Angiogram 4/12 at 7 am    Arrive for procedure at: [facility]    You will receive a phone call from [facility] Pre-Op Department with further instructions prior to your scheduled procedure.    Notify the nurse if you are ALLERGIC TO IODINE.    FASTING: You MAY NOT have anything to eat or drink AFTER MIDNIGHT the day before your procedure. If your procedure is scheduled in the afternoon, you may have a LIGHT BREAKFAST 6-8 hours prior to your procedure.  For example: Two slices of toast; black coffee or black tea.    MEDICATIONS: You may take your regular morning medications with water. If there are any medications that you should not take, you will be instructed to hold them for that morning.    CARDIOLOGY PRE-PROCEDURE MEDICATION ORDERS:  ** Please hold any medications that are checked below:    HOLD   # OF DAYS TO HOLD  Coumadin   Consult with Coumadin Clinic   Xarelto    _DAY BEFORE & DAY OF_  Pradaxa  _ DAY BEFORE & DAY OF _  Eliquis   _ DAY BEFORE & DAY OF _  Metformin    Day before procedure & morning of procedure  Short acting insulin   Morning of procedure    CONTINUE the Following Medications   Plavix      Effient     Aspirin    WHAT TO EXPECT:    How long will the procedure take?  The procedure will take an average of 1 - 2 hours to perform.  After the procedure, you will need to lay flat for around 4 - 6 hours to minimize bleeding from the puncture site. If the wrist is accessed you will need to keep your arm still as instructed by the nurse.    When can I go home?  You may be able to be discharged home that same afternoon if there were no complications.  If you have one of the following: balloon; stent; pacemaker or defibrillator procedures, you may spend one night for observation.  Your doctor will determine your discharge based upon your progress.  The results of your procedure will be discussed with you before you are discharged.  Any further testing or procedures will be scheduled for  you either before you leave or you will be instructed to call for a future appointment.      TRANSPORTATION:  PLEASE ARRANGE TO HAVE SOMEONE DRIVE YOU HOME FOLLOWING YOUR PROCEDURE, YOU WILL NOT BE ALLOWED TO DRIVE.

## 2023-04-04 NOTE — PROGRESS NOTES
Subjective:    Patient ID:  Luigi Murillo is a 72 y.o. male who presents for follow-up of Shortness of Breath and Chest Pain      HPI  He comes for follow up with worsening chest pain with exertion over the last couple of months.  Similar to the symptoms he had many years ago when he ended up having PCI      Review of Systems   Constitutional: Negative for decreased appetite, malaise/fatigue, weight gain and weight loss.   Cardiovascular:  Negative for chest pain, dyspnea on exertion, leg swelling, palpitations and syncope.   Respiratory:  Negative for cough and shortness of breath.    Gastrointestinal: Negative.    Neurological:  Negative for weakness.   All other systems reviewed and are negative.     Objective:      Physical Exam  Vitals and nursing note reviewed.   Constitutional:       Appearance: Normal appearance. He is well-developed.   HENT:      Head: Normocephalic.   Eyes:      Pupils: Pupils are equal, round, and reactive to light.   Neck:      Thyroid: No thyromegaly.      Vascular: No carotid bruit or JVD.   Cardiovascular:      Rate and Rhythm: Normal rate and regular rhythm.      Chest Wall: PMI is not displaced.      Pulses: Normal pulses and intact distal pulses.      Heart sounds: Normal heart sounds. No murmur heard.    No gallop.   Pulmonary:      Effort: Pulmonary effort is normal.      Breath sounds: Normal breath sounds.   Abdominal:      Palpations: Abdomen is soft. There is no mass.      Tenderness: There is no abdominal tenderness.   Musculoskeletal:         General: Normal range of motion.      Cervical back: Normal range of motion and neck supple.   Skin:     General: Skin is warm.   Neurological:      Mental Status: He is alert and oriented to person, place, and time.      Sensory: No sensory deficit.      Deep Tendon Reflexes: Reflexes are normal and symmetric.       Assessment:       1. Coronary artery disease involving native coronary artery of native heart without angina  pectoris    2. Status post coronary artery stent placement    3. Primary hypertension         Plan:     Cardiac catheterization plus minus PCI  Radial access

## 2023-04-15 ENCOUNTER — PATIENT MESSAGE (OUTPATIENT)
Dept: CARDIOLOGY | Facility: CLINIC | Age: 73
End: 2023-04-15
Payer: MEDICARE

## 2023-05-03 ENCOUNTER — OFFICE VISIT (OUTPATIENT)
Dept: CARDIOLOGY | Facility: CLINIC | Age: 73
End: 2023-05-03
Payer: MEDICARE

## 2023-05-03 VITALS
HEART RATE: 77 BPM | WEIGHT: 199.75 LBS | BODY MASS INDEX: 27.97 KG/M2 | DIASTOLIC BLOOD PRESSURE: 71 MMHG | SYSTOLIC BLOOD PRESSURE: 124 MMHG | HEIGHT: 71 IN

## 2023-05-03 DIAGNOSIS — I10 PRIMARY HYPERTENSION: ICD-10-CM

## 2023-05-03 DIAGNOSIS — E78.5 HYPERLIPIDEMIA WITH TARGET LDL LESS THAN 100: ICD-10-CM

## 2023-05-03 DIAGNOSIS — I25.10 CORONARY ARTERY DISEASE INVOLVING NATIVE CORONARY ARTERY OF NATIVE HEART WITHOUT ANGINA PECTORIS: Primary | ICD-10-CM

## 2023-05-03 PROCEDURE — 99999 PR PBB SHADOW E&M-EST. PATIENT-LVL II: ICD-10-PCS | Mod: PBBFAC,,, | Performed by: INTERNAL MEDICINE

## 2023-05-03 PROCEDURE — 99214 PR OFFICE/OUTPT VISIT, EST, LEVL IV, 30-39 MIN: ICD-10-PCS | Mod: S$PBB,,, | Performed by: INTERNAL MEDICINE

## 2023-05-03 PROCEDURE — 99999 PR PBB SHADOW E&M-EST. PATIENT-LVL II: CPT | Mod: PBBFAC,,, | Performed by: INTERNAL MEDICINE

## 2023-05-03 PROCEDURE — 99214 OFFICE O/P EST MOD 30 MIN: CPT | Mod: S$PBB,,, | Performed by: INTERNAL MEDICINE

## 2023-05-03 PROCEDURE — 99212 OFFICE O/P EST SF 10 MIN: CPT | Mod: PBBFAC,PO | Performed by: INTERNAL MEDICINE

## 2023-05-03 NOTE — PROGRESS NOTES
Subjective:    Patient ID:  Luigi Murillo is a 72 y.o. male who presents for follow-up of Coronary Artery Disease      HPI  Had PTCA of small diagonal branch last month.  He comes for follow up with no major problems, no chest pain, no shortness of breath.  Excessive bruising his main complaint.  Blood pressure extremely normal at home    Review of Systems   Constitutional: Negative for decreased appetite, malaise/fatigue, weight gain and weight loss.   Cardiovascular:  Negative for chest pain, dyspnea on exertion, leg swelling, palpitations and syncope.   Respiratory:  Negative for cough and shortness of breath.    Gastrointestinal: Negative.    Neurological:  Negative for weakness.   All other systems reviewed and are negative.     Objective:      Physical Exam  Vitals and nursing note reviewed.   Constitutional:       Appearance: Normal appearance. He is well-developed.   HENT:      Head: Normocephalic.   Eyes:      Pupils: Pupils are equal, round, and reactive to light.   Neck:      Thyroid: No thyromegaly.      Vascular: No carotid bruit or JVD.   Cardiovascular:      Rate and Rhythm: Normal rate and regular rhythm.      Chest Wall: PMI is not displaced.      Pulses: Normal pulses and intact distal pulses.      Heart sounds: Normal heart sounds. No murmur heard.    No gallop.   Pulmonary:      Effort: Pulmonary effort is normal.      Breath sounds: Normal breath sounds.   Abdominal:      Palpations: Abdomen is soft. There is no mass.      Tenderness: There is no abdominal tenderness.   Musculoskeletal:         General: Normal range of motion.      Cervical back: Normal range of motion and neck supple.   Skin:     General: Skin is warm.   Neurological:      Mental Status: He is alert and oriented to person, place, and time.      Sensory: No sensory deficit.      Deep Tendon Reflexes: Reflexes are normal and symmetric.         Assessment:       1. Coronary artery disease involving native coronary artery of  native heart without angina pectoris    2. Hyperlipidemia with target LDL less than 100    3. Primary hypertension         Plan:   Stop lisinopril and Ranexa.  Baby aspirin once a week.    Continue Plavix, metoprolol and atorvastatin  Continue all cardiac medications  Regular exercise program  Weight loss  Six-month follow-up with Crista Correia

## 2023-06-14 ENCOUNTER — PES CALL (OUTPATIENT)
Dept: ADMINISTRATIVE | Facility: CLINIC | Age: 73
End: 2023-06-14
Payer: MEDICARE

## 2023-07-05 ENCOUNTER — PATIENT MESSAGE (OUTPATIENT)
Dept: CARDIOLOGY | Facility: CLINIC | Age: 73
End: 2023-07-05
Payer: MEDICARE

## 2023-07-05 ENCOUNTER — PATIENT MESSAGE (OUTPATIENT)
Dept: FAMILY MEDICINE | Facility: CLINIC | Age: 73
End: 2023-07-05
Payer: MEDICARE

## 2023-07-19 ENCOUNTER — PATIENT MESSAGE (OUTPATIENT)
Dept: CARDIOLOGY | Facility: CLINIC | Age: 73
End: 2023-07-19
Payer: MEDICARE

## 2023-08-07 ENCOUNTER — PATIENT MESSAGE (OUTPATIENT)
Dept: FAMILY MEDICINE | Facility: CLINIC | Age: 73
End: 2023-08-07
Payer: MEDICARE

## 2023-08-07 DIAGNOSIS — F32.5 DEPRESSION, MAJOR, IN REMISSION: ICD-10-CM

## 2023-08-08 RX ORDER — BUPROPION HYDROCHLORIDE 150 MG/1
150 TABLET ORAL DAILY
Qty: 90 TABLET | Refills: 3 | Status: SHIPPED | OUTPATIENT
Start: 2023-08-08 | End: 2023-08-08 | Stop reason: SDUPTHER

## 2023-08-08 RX ORDER — BUPROPION HYDROCHLORIDE 150 MG/1
150 TABLET ORAL DAILY
Qty: 30 TABLET | Refills: 0 | Status: SHIPPED | OUTPATIENT
Start: 2023-08-08 | End: 2023-08-14 | Stop reason: SDUPTHER

## 2023-08-08 NOTE — TELEPHONE ENCOUNTER
I have signed for the following orders AND/OR meds.  Please call the patient and ask the patient to schedule the testing AND/OR inform about any medications that were sent. Medications have been sent to pharmacy listed below      No orders of the defined types were placed in this encounter.      Medications Ordered This Encounter   Medications    buPROPion (WELLBUTRIN XL) 150 MG TB24 tablet     Sig: Take 1 tablet (150 mg total) by mouth once daily.     Dispense:  30 tablet     Refill:  0     This prescription was filled on 2/27/2023. Any refills authorized will be placed on file.         [facility]  for [facility] - [place], [place] - [address] [place] [address]  Phone: [phone] Fax: [phone]    [facility] - [place] [address]  Phone: [phone] Fax: [phone]    [facility] - [place] - [address]  [address]  [place] [address]  Phone: [phone] Fax: [phone]

## 2023-08-14 ENCOUNTER — PATIENT MESSAGE (OUTPATIENT)
Dept: FAMILY MEDICINE | Facility: CLINIC | Age: 73
End: 2023-08-14
Payer: MEDICARE

## 2023-08-14 DIAGNOSIS — F32.5 DEPRESSION, MAJOR, IN REMISSION: ICD-10-CM

## 2023-08-14 RX ORDER — BUPROPION HYDROCHLORIDE 150 MG/1
150 TABLET ORAL DAILY
Qty: 90 TABLET | Refills: 3 | Status: SHIPPED | OUTPATIENT
Start: 2023-08-14

## 2023-08-14 NOTE — TELEPHONE ENCOUNTER
Care Due:                  Date            Visit Type   Department     Provider  --------------------------------------------------------------------------------                                EP -                              PRIMARY      [facility]  Last Visit: 03-      CARE ([facility])   DAMIAN Stafford                               -                              PRIMARY      [facility]  Next Visit: 08-      CARE ([facility])   MEDICINE       Iveth Stafford                                                            Last  Test          Frequency    Reason                     Performed    Due Date  --------------------------------------------------------------------------------    Lipid Panel.  12 months..  atorvastatin.............  08- 08-    [facility] Embedded Care Due Messages. Reference number: [id number].   8/14/2023 11:09:12 AM CDT

## 2023-08-25 ENCOUNTER — PATIENT MESSAGE (OUTPATIENT)
Dept: ADMINISTRATIVE | Facility: HOSPITAL | Age: 73
End: 2023-08-25
Payer: MEDICARE

## 2023-08-31 ENCOUNTER — OFFICE VISIT (OUTPATIENT)
Dept: FAMILY MEDICINE | Facility: CLINIC | Age: 73
End: 2023-08-31
Payer: MEDICARE

## 2023-08-31 VITALS
DIASTOLIC BLOOD PRESSURE: 88 MMHG | TEMPERATURE: 98 F | WEIGHT: 203 LBS | HEIGHT: 71 IN | HEART RATE: 75 BPM | SYSTOLIC BLOOD PRESSURE: 178 MMHG | BODY MASS INDEX: 28.42 KG/M2

## 2023-08-31 DIAGNOSIS — Z13.1 DIABETES MELLITUS SCREENING: ICD-10-CM

## 2023-08-31 DIAGNOSIS — E78.5 HYPERLIPIDEMIA WITH TARGET LDL LESS THAN 100: ICD-10-CM

## 2023-08-31 DIAGNOSIS — I10 PRIMARY HYPERTENSION: ICD-10-CM

## 2023-08-31 DIAGNOSIS — N25.81 SECONDARY HYPERPARATHYROIDISM: ICD-10-CM

## 2023-08-31 DIAGNOSIS — N40.1 BENIGN PROSTATIC HYPERPLASIA (BPH) WITH STRAINING ON URINATION: ICD-10-CM

## 2023-08-31 DIAGNOSIS — R39.16 BENIGN PROSTATIC HYPERPLASIA (BPH) WITH STRAINING ON URINATION: ICD-10-CM

## 2023-08-31 DIAGNOSIS — E55.9 VITAMIN D DEFICIENCY: Primary | ICD-10-CM

## 2023-08-31 DIAGNOSIS — I25.118 CORONARY ARTERY DISEASE OF NATIVE ARTERY OF NATIVE HEART WITH STABLE ANGINA PECTORIS: ICD-10-CM

## 2023-08-31 DIAGNOSIS — M15.3 POST-TRAUMATIC OSTEOARTHRITIS OF MULTIPLE JOINTS: ICD-10-CM

## 2023-08-31 DIAGNOSIS — Z12.5 SCREENING PSA (PROSTATE SPECIFIC ANTIGEN): ICD-10-CM

## 2023-08-31 DIAGNOSIS — F32.5 DEPRESSION, MAJOR, IN REMISSION: ICD-10-CM

## 2023-08-31 DIAGNOSIS — N18.31 STAGE 3A CHRONIC KIDNEY DISEASE: ICD-10-CM

## 2023-08-31 PROCEDURE — 99999 PR PBB SHADOW E&M-EST. PATIENT-LVL III: ICD-10-PCS | Mod: PBBFAC,,, | Performed by: INTERNAL MEDICINE

## 2023-08-31 PROCEDURE — 99214 PR OFFICE/OUTPT VISIT, EST, LEVL IV, 30-39 MIN: ICD-10-PCS | Mod: S$PBB,,, | Performed by: INTERNAL MEDICINE

## 2023-08-31 PROCEDURE — 99999 PR PBB SHADOW E&M-EST. PATIENT-LVL III: CPT | Mod: PBBFAC,,, | Performed by: INTERNAL MEDICINE

## 2023-08-31 PROCEDURE — 99214 OFFICE O/P EST MOD 30 MIN: CPT | Mod: S$PBB,,, | Performed by: INTERNAL MEDICINE

## 2023-08-31 PROCEDURE — 99213 OFFICE O/P EST LOW 20 MIN: CPT | Mod: PBBFAC,PO | Performed by: INTERNAL MEDICINE

## 2023-08-31 RX ORDER — ALPRAZOLAM 0.25 MG/1
0.25 TABLET ORAL NIGHTLY
COMMUNITY
Start: 2023-08-16 | End: 2024-03-04

## 2023-08-31 RX ORDER — DICLOFENAC SODIUM 10 MG/G
2 GEL TOPICAL 2 TIMES DAILY PRN
Qty: 450 G | Refills: 1 | Status: SHIPPED | OUTPATIENT
Start: 2023-08-31

## 2023-08-31 NOTE — PROGRESS NOTES
Assessment/Plan:    Problem List Items Addressed This Visit          Psychiatric    Depression, major, in remission    Overview     -symptoms stable  -recent loss of wife but coping as well as expected  -currently on wellbutrin  mg and zoloft 50 mg QD  -of note, hx of AE with both cymbalta and lexapro  -denies AE of current medications  -discussed risk of discontinuing this medication without tapering  -patient will follow up routinely and notify us if having any side effects or worsening or persistent symptoms. ER precautions were given.            Cardiac/Vascular    Coronary artery disease of native artery of native heart with stable angina pectoris    Overview     -S/p multiple stents placed; last in April 2023  -followed by Dr. Barros   -on ASA/statin daily and plavix three times weekly  -also with hx of stable angina, on ranexa         Hyperlipidemia with target LDL less than 100    Overview     Hyperlipidemia Medications               atorvastatin (LIPITOR) 40 MG tablet TAKE 1 TABLET DAILY   -chronic condition. Currently stable.    -reports compliance with hyperlipidemia treatment as prescribed  -denies any known adverse effects of medications  -most recent labs listed below; due for repeat labs  Lab Results   Component Value Date    CHOL 149 08/09/2022     Lab Results   Component Value Date    HDL 45 08/09/2022     Lab Results   Component Value Date    LDLCALC 74.2 08/09/2022     Lab Results   Component Value Date    TRIG 149 08/09/2022     Lab Results   Component Value Date    ALT 22 04/12/2023    AST 28 04/12/2023    ALKPHOS 91 04/12/2023    BILITOT 0.5 04/12/2023            Hypertension    Overview       Hypertension Medications               lisinopriL (PRINIVIL,ZESTRIL) 2.5 MG tablet Take 1 tablet (2.5 mg total) by mouth once daily.    metoprolol succinate (TOPROL-XL) 25 MG 24 hr tablet TAKE ONE-HALF (1/2) TABLET DAILY   -above goal today but has been normal  -will call for home  reading  -continue lifestyle modification with low sodium diet and exercise   -discussed hypertension disease course and importance of treating high blood pressure  -patient understood and advised of risk of untreated blood pressure.  ER precautions were given   for symptoms of hypertensive urgency and emergency.             Renal/    Benign prostatic hyperplasia (BPH) with straining on urination    Overview     -chronic  -symptoms improved with flomax 0.8 mg QHS         Stage 3a chronic kidney disease    Overview     -labs remain stable  -renally dose medication  -avoid nephrotoxic agents, including NSAIDs            Endocrine    Secondary hyperparathyroidism    Overview     -2/2 vit d def  -checking vit d/pth with next labs         Relevant Orders    PTH, Intact    Vitamin D deficiency - Primary    Relevant Orders    Vitamin D    PTH, Intact       Orthopedic    Osteoarthritis    Relevant Medications    diclofenac sodium (VOLTAREN) 1 % Gel     Other Visit Diagnoses       Diabetes mellitus screening        Screening PSA (prostate specific antigen)        Relevant Orders    PSA, Screening            Follow up for labs in Oct: cmp,lipid,vit d,pth.    Iveth Stafford MD  _____________________________________________________________________________________________________________________________________________________    CC: follow up of chronic medical conditions     HPI:    Patient is in clinic today as an established patient.    Since last visit, patient's wife has passed away. She has been chronically ill for some time and he was her primary care giver. He is sad about her passing but reports that he is coping as well as would be expected for his situation. He is still taking zoloft and wellbutrin and feels as thought the medication is helping. Denies AE of the medication. He has followed up with cardiology since our last visit as well. He did require another stent placed for treatment of his CAD. Reports doing  well since that time. He is still taking asa daily and is taking plavix 3 times weekly. His BP is elevated today but reports that his has been normal. He denies any ongoing symptoms of HA, vision changes, CP, SOB or palpitations.    No other new complaints today.  Remaining chronic conditions have been reviewed and remain stable. Further detail as stated above.     HM reviewed.     No recent changes to medical/surgical history.    Current Outpatient Medications on File Prior to Visit   Medication Sig Dispense Refill    ALPRAZolam (XANAX) 0.25 MG tablet Take 0.25 mg by mouth every evening.      aspirin (ECOTRIN) 81 MG EC tablet Take 81 mg by mouth once daily.      atorvastatin (LIPITOR) 40 MG tablet TAKE 1 TABLET DAILY 90 tablet 3    buPROPion (WELLBUTRIN XL) 150 MG TB24 tablet Take 1 tablet (150 mg total) by mouth once daily. 90 tablet 3    clopidogreL (PLAVIX) 75 mg tablet Take 1 tablet (75 mg total) by mouth once daily. 30 tablet 11    famotidine (PEPCID) 20 MG tablet Take 20 mg by mouth every evening.      lisinopriL (PRINIVIL,ZESTRIL) 2.5 MG tablet Take 1 tablet (2.5 mg total) by mouth once daily. 90 tablet 3    metoprolol succinate (TOPROL-XL) 25 MG 24 hr tablet TAKE ONE-HALF (1/2) TABLET DAILY 45 tablet 3    morphine (MS CONTIN) 30 MG 12 hr tablet Take 30 mg by mouth 3 (three) times daily.      oxyCODONE-acetaminophen (PERCOCET)  mg per tablet       ranolazine (RANEXA) 500 MG Tb12 Take 1 tablet (500 mg total) by mouth 2 (two) times daily. 180 tablet 3    sertraline (ZOLOFT) 50 MG tablet Take 1 tablet (50 mg total) by mouth once daily. 90 tablet 3    tamsulosin (FLOMAX) 0.4 mg Cap TAKE 2 CAPSULES EVERY EVENING 180 capsule 3    tiZANidine (ZANAFLEX) 4 MG tablet Take 1 tablet by mouth daily as needed.       No current facility-administered medications on file prior to visit.       Review of Systems   Constitutional:  Negative for chills, diaphoresis, fatigue and fever.   HENT:  Negative for  "congestion, ear pain, postnasal drip, sinus pain and sore throat.    Eyes:  Negative for pain and redness.   Respiratory:  Negative for cough, chest tightness and shortness of breath.    Cardiovascular:  Negative for chest pain and leg swelling.   Gastrointestinal:  Negative for abdominal pain, constipation, diarrhea, nausea and vomiting.   Genitourinary:  Negative for dysuria and hematuria.   Musculoskeletal:  Negative for arthralgias and joint swelling.   Skin:  Negative for rash.   Neurological:  Negative for dizziness, syncope and headaches.   Psychiatric/Behavioral:  Negative for dysphoric mood. The patient is not nervous/anxious.      Vitals:    08/31/23 0849   BP: (!) 178/88   Pulse: 75   Temp: 98.2 °F (36.8 °C)   Weight: 92.1 kg (203 lb)   Height: 5' 11" (1.803 m)       Wt Readings from Last 3 Encounters:   08/31/23 92.1 kg (203 lb)   05/03/23 90.6 kg (199 lb 11.8 oz)   04/12/23 90.7 kg (200 lb)       Physical Exam  Constitutional:       General: He is not in acute distress.     Appearance: Normal appearance. He is well-developed.   HENT:      Head: Normocephalic and atraumatic.   Eyes:      Conjunctiva/sclera: Conjunctivae normal.   Cardiovascular:      Rate and Rhythm: Normal rate and regular rhythm.      Pulses: Normal pulses.      Heart sounds: Normal heart sounds. No murmur heard.  Pulmonary:      Effort: Pulmonary effort is normal. No respiratory distress.      Breath sounds: Normal breath sounds.   Abdominal:      General: Bowel sounds are normal. There is no distension.      Palpations: Abdomen is soft.      Tenderness: There is no abdominal tenderness.   Musculoskeletal:         General: Normal range of motion.      Cervical back: Normal range of motion and neck supple.   Skin:     General: Skin is warm and dry.      Findings: No rash.   Neurological:      General: No focal deficit present.      Mental Status: He is alert and oriented to person, place, and time.   Psychiatric:         Mood and " Affect: Mood normal.         Behavior: Behavior normal.     Health Maintenance   Topic Date Due    Shingles Vaccine (1 of 2) Never done    Colorectal Cancer Screening  12/17/2022    Lipid Panel  08/09/2023    High Dose Statin  08/31/2024    TETANUS VACCINE  10/15/2028    Hepatitis C Screening  Completed    Abdominal Aortic Aneurysm Screening  Completed

## 2023-09-04 PROBLEM — R07.2 PRECORDIAL PAIN: Status: RESOLVED | Noted: 2023-04-04 | Resolved: 2023-09-04

## 2023-09-05 ENCOUNTER — TELEPHONE (OUTPATIENT)
Dept: FAMILY MEDICINE | Facility: CLINIC | Age: 73
End: 2023-09-05
Payer: MEDICARE

## 2023-09-05 NOTE — TELEPHONE ENCOUNTER
----- Message from Iveth Stafford MD sent at 9/4/2023  3:47 PM CDT -----  Please add PSA to next scheduled labs

## 2023-09-19 ENCOUNTER — PATIENT MESSAGE (OUTPATIENT)
Dept: FAMILY MEDICINE | Facility: CLINIC | Age: 73
End: 2023-09-19
Payer: MEDICARE

## 2023-09-19 DIAGNOSIS — I25.10 CAD IN NATIVE ARTERY: ICD-10-CM

## 2023-09-20 RX ORDER — METOPROLOL SUCCINATE 25 MG/1
TABLET, EXTENDED RELEASE ORAL
Qty: 45 TABLET | Refills: 3 | Status: SHIPPED | OUTPATIENT
Start: 2023-09-20

## 2023-09-20 NOTE — TELEPHONE ENCOUNTER
No care due was identified.  [facility] Embedded Care Due Messages. Reference number: [id number].   9/19/2023 9:42:52 PM CDT

## 2023-09-20 NOTE — TELEPHONE ENCOUNTER
Refill Routing Note   Medication(s) are not appropriate for processing by [facility] for the following reason(s):      Required vitals abnormal    [facility] action(s):  Defer Care Due:  None identified            Appointments  past 12m or future 3m with PCP    Date Provider   Last Visit   8/31/2023 Iveth Stafford MD   Next Visit   Visit date not found Iveth Stafford MD   ED visits in past 90 days: 0        Note composed:4:20 AM 09/20/2023

## 2023-09-21 ENCOUNTER — PATIENT OUTREACH (OUTPATIENT)
Dept: ADMINISTRATIVE | Facility: HOSPITAL | Age: 73
End: 2023-09-21
Payer: MEDICARE

## 2023-09-21 NOTE — PROGRESS NOTES
Working  HTN Report.    Requested an updated bp reading. States he can't do it right now, but would check it later and send through pt portal. Advised him I would send a mess and asked him to respond to that. He said he would rather send it to Dr Stafford. Told him that was fine, would look for it later.

## 2023-10-26 ENCOUNTER — LAB VISIT (OUTPATIENT)
Dept: LAB | Facility: HOSPITAL | Age: 73
End: 2023-10-26
Attending: INTERNAL MEDICINE
Payer: MEDICARE

## 2023-10-26 DIAGNOSIS — E78.5 HYPERLIPIDEMIA WITH TARGET LDL LESS THAN 100: ICD-10-CM

## 2023-10-26 DIAGNOSIS — Z79.899 ENCOUNTER FOR LONG-TERM (CURRENT) USE OF HIGH-RISK MEDICATION: ICD-10-CM

## 2023-10-26 DIAGNOSIS — N25.81 SECONDARY HYPERPARATHYROIDISM: ICD-10-CM

## 2023-10-26 DIAGNOSIS — E55.9 VITAMIN D DEFICIENCY: ICD-10-CM

## 2023-10-26 LAB
25(OH)D3+25(OH)D2 SERPL-MCNC: 29 NG/ML (ref 30–96)
ALBUMIN SERPL BCP-MCNC: 3.7 G/DL (ref 3.5–5.2)
ALP SERPL-CCNC: 95 U/L (ref 55–135)
ALT SERPL W/O P-5'-P-CCNC: 22 U/L (ref 10–44)
ANION GAP SERPL CALC-SCNC: 10 MMOL/L (ref 8–16)
AST SERPL-CCNC: 23 U/L (ref 10–40)
BILIRUB SERPL-MCNC: 0.4 MG/DL (ref 0.1–1)
BUN SERPL-MCNC: 15 MG/DL (ref 8–23)
CALCIUM SERPL-MCNC: 8.8 MG/DL (ref 8.7–10.5)
CHLORIDE SERPL-SCNC: 105 MMOL/L (ref 95–110)
CHOLEST SERPL-MCNC: 221 MG/DL (ref 120–199)
CHOLEST/HDLC SERPL: 4.2 {RATIO} (ref 2–5)
CO2 SERPL-SCNC: 25 MMOL/L (ref 23–29)
CREAT SERPL-MCNC: 1.2 MG/DL (ref 0.5–1.4)
EST. GFR  (NO RACE VARIABLE): >60 ML/MIN/1.73 M^2
GLUCOSE SERPL-MCNC: 103 MG/DL (ref 70–110)
HDLC SERPL-MCNC: 53 MG/DL (ref 40–75)
HDLC SERPL: 24 % (ref 20–50)
LDLC SERPL CALC-MCNC: 145.6 MG/DL (ref 63–159)
NONHDLC SERPL-MCNC: 168 MG/DL
POTASSIUM SERPL-SCNC: 4.4 MMOL/L (ref 3.5–5.1)
PROT SERPL-MCNC: 6.8 G/DL (ref 6–8.4)
PTH-INTACT SERPL-MCNC: 172.8 PG/ML (ref 9–77)
SODIUM SERPL-SCNC: 140 MMOL/L (ref 136–145)
TRIGL SERPL-MCNC: 112 MG/DL (ref 30–150)

## 2023-10-26 PROCEDURE — 83970 ASSAY OF PARATHORMONE: CPT | Performed by: INTERNAL MEDICINE

## 2023-10-26 PROCEDURE — 36415 COLL VENOUS BLD VENIPUNCTURE: CPT | Mod: PO | Performed by: INTERNAL MEDICINE

## 2023-10-26 PROCEDURE — 80061 LIPID PANEL: CPT | Performed by: INTERNAL MEDICINE

## 2023-10-26 PROCEDURE — 82306 VITAMIN D 25 HYDROXY: CPT | Performed by: INTERNAL MEDICINE

## 2023-10-26 PROCEDURE — 80053 COMPREHEN METABOLIC PANEL: CPT | Performed by: INTERNAL MEDICINE

## 2023-12-17 DIAGNOSIS — N25.81 SECONDARY HYPERPARATHYROIDISM: Primary | ICD-10-CM

## 2023-12-17 DIAGNOSIS — E55.9 VITAMIN D DEFICIENCY: ICD-10-CM

## 2023-12-18 NOTE — PROGRESS NOTES
Results have been released via [employer]. Please verify that these have been viewed by patient. If not, please call patient with results.    Please schedule the following orders:  Renal function, PTH, Vit D in 3 mo    I have sent a message to them with the following interpretation (see below).    I have your recent results.    Last lab results showed that your vitamin D was still low. Make sure you are taking at least 1000 units of vitamin d3 daily. If you are already, then increase to 2000 units daily. The remainder of the labs were normal.    Please do not hesitate to call or message with any additional questions or concerns.    Iveth Stafford MD

## 2024-01-10 ENCOUNTER — PATIENT MESSAGE (OUTPATIENT)
Dept: CARDIOLOGY | Facility: CLINIC | Age: 74
End: 2024-01-10
Payer: MEDICARE

## 2024-01-16 ENCOUNTER — LAB VISIT (OUTPATIENT)
Dept: LAB | Facility: HOSPITAL | Age: 74
End: 2024-01-16
Attending: PHYSICIAN ASSISTANT
Payer: MEDICARE

## 2024-01-16 ENCOUNTER — OFFICE VISIT (OUTPATIENT)
Dept: FAMILY MEDICINE | Facility: CLINIC | Age: 74
End: 2024-01-16
Payer: MEDICARE

## 2024-01-16 VITALS
DIASTOLIC BLOOD PRESSURE: 74 MMHG | HEART RATE: 83 BPM | HEIGHT: 71 IN | TEMPERATURE: 99 F | SYSTOLIC BLOOD PRESSURE: 138 MMHG | BODY MASS INDEX: 28.84 KG/M2 | WEIGHT: 206 LBS

## 2024-01-16 DIAGNOSIS — Z01.818 PREOPERATIVE CLEARANCE: Primary | ICD-10-CM

## 2024-01-16 DIAGNOSIS — Z01.818 PREOPERATIVE CLEARANCE: ICD-10-CM

## 2024-01-16 LAB
ALBUMIN SERPL BCP-MCNC: 3.6 G/DL (ref 3.5–5.2)
ALP SERPL-CCNC: 83 U/L (ref 55–135)
ALT SERPL W/O P-5'-P-CCNC: 25 U/L (ref 10–44)
ANION GAP SERPL CALC-SCNC: 6 MMOL/L (ref 8–16)
AST SERPL-CCNC: 25 U/L (ref 10–40)
BASOPHILS # BLD AUTO: 0.03 K/UL (ref 0–0.2)
BASOPHILS NFR BLD: 0.7 % (ref 0–1.9)
BILIRUB SERPL-MCNC: 0.3 MG/DL (ref 0.1–1)
BUN SERPL-MCNC: 17 MG/DL (ref 8–23)
CALCIUM SERPL-MCNC: 8.5 MG/DL (ref 8.7–10.5)
CHLORIDE SERPL-SCNC: 106 MMOL/L (ref 95–110)
CO2 SERPL-SCNC: 27 MMOL/L (ref 23–29)
CREAT SERPL-MCNC: 1.2 MG/DL (ref 0.5–1.4)
DIFFERENTIAL METHOD BLD: ABNORMAL
EOSINOPHIL # BLD AUTO: 0.2 K/UL (ref 0–0.5)
EOSINOPHIL NFR BLD: 4.8 % (ref 0–8)
ERYTHROCYTE [DISTWIDTH] IN BLOOD BY AUTOMATED COUNT: 13.5 % (ref 11.5–14.5)
EST. GFR  (NO RACE VARIABLE): >60 ML/MIN/1.73 M^2
GLUCOSE SERPL-MCNC: 95 MG/DL (ref 70–110)
HCT VFR BLD AUTO: 35.9 % (ref 40–54)
HGB BLD-MCNC: 11.4 G/DL (ref 14–18)
IMM GRANULOCYTES # BLD AUTO: 0.01 K/UL (ref 0–0.04)
IMM GRANULOCYTES NFR BLD AUTO: 0.2 % (ref 0–0.5)
LYMPHOCYTES # BLD AUTO: 1.3 K/UL (ref 1–4.8)
LYMPHOCYTES NFR BLD: 31.1 % (ref 18–48)
MCH RBC QN AUTO: 29.1 PG (ref 27–31)
MCHC RBC AUTO-ENTMCNC: 31.8 G/DL (ref 32–36)
MCV RBC AUTO: 92 FL (ref 82–98)
MONOCYTES # BLD AUTO: 0.5 K/UL (ref 0.3–1)
MONOCYTES NFR BLD: 11.4 % (ref 4–15)
NEUTROPHILS # BLD AUTO: 2.2 K/UL (ref 1.8–7.7)
NEUTROPHILS NFR BLD: 51.8 % (ref 38–73)
NRBC BLD-RTO: 0 /100 WBC
PLATELET # BLD AUTO: 139 K/UL (ref 150–450)
PMV BLD AUTO: 10 FL (ref 9.2–12.9)
POTASSIUM SERPL-SCNC: 3.9 MMOL/L (ref 3.5–5.1)
PROT SERPL-MCNC: 6.4 G/DL (ref 6–8.4)
RBC # BLD AUTO: 3.92 M/UL (ref 4.6–6.2)
SODIUM SERPL-SCNC: 139 MMOL/L (ref 136–145)
WBC # BLD AUTO: 4.21 K/UL (ref 3.9–12.7)

## 2024-01-16 PROCEDURE — 80053 COMPREHEN METABOLIC PANEL: CPT | Performed by: PHYSICIAN ASSISTANT

## 2024-01-16 PROCEDURE — 36415 COLL VENOUS BLD VENIPUNCTURE: CPT | Mod: PO | Performed by: PHYSICIAN ASSISTANT

## 2024-01-16 PROCEDURE — 93010 ELECTROCARDIOGRAM REPORT: CPT | Mod: S$PBB,,, | Performed by: INTERNAL MEDICINE

## 2024-01-16 PROCEDURE — 85025 COMPLETE CBC W/AUTO DIFF WBC: CPT | Performed by: PHYSICIAN ASSISTANT

## 2024-01-16 PROCEDURE — 99999 PR PBB SHADOW E&M-EST. PATIENT-LVL IV: CPT | Mod: PBBFAC,,, | Performed by: PHYSICIAN ASSISTANT

## 2024-01-16 PROCEDURE — 99214 OFFICE O/P EST MOD 30 MIN: CPT | Mod: S$PBB,,, | Performed by: PHYSICIAN ASSISTANT

## 2024-01-16 PROCEDURE — 93005 ELECTROCARDIOGRAM TRACING: CPT | Mod: PBBFAC,PO | Performed by: INTERNAL MEDICINE

## 2024-01-16 PROCEDURE — 99214 OFFICE O/P EST MOD 30 MIN: CPT | Mod: PBBFAC,PO | Performed by: PHYSICIAN ASSISTANT

## 2024-01-16 NOTE — PROGRESS NOTES
Assessment/Plan:    Problem List Items Addressed This Visit    None  Visit Diagnoses       Preoperative clearance    -  Primary    Relevant Orders    IN OFFICE EKG 12-LEAD (to Muse)    CBC Auto Differential    Comprehensive Metabolic Panel           Assessment:   73 y.o. male with planned surgery A1 pulley release of R trigger fingers.    Known risk factors for perioperative complications: CAD, HTN, HLD, and CKD  Difficulty with intubation is not anticipated.    Cardiac Risk Estimation: per the Revised Cardiac Risk Index (Circ. 100:1043, 1999), the patient's risk factors for cardiac complications include history of ischemic heart disease, putting him in: RCI RISK CLASS II (1 risk factor, risk of major cardiac compl. appr. 1.3%).    Current medications which may produce withdrawal symptoms if withheld perioperatively: none.     Plan:   1. Preoperative workup as follows ECG, hemoglobin, hematocrit, electrolytes, creatinine, glucose, liver function studies.  2. Change in medication regimen before surgery: none, continue medication regimen including morning of surgery, with sip of water.  3. Prophylaxis for cardiac events with perioperative beta-blockers: should be considered, specific regimen per anesthesia.  4. Invasive hemodynamic monitoring perioperatively: at the discretion of anesthesiologist.  5. Deep vein thrombosis prophylaxis postoperatively:regimen to be chosen by surgical team.  6. Surveillance for postoperative MI with ECG immediately postoperatively and on postoperative days 1 and 2 AND troponin levels 24 hours postoperatively and on day 4 or hospital discharge (whichever comes first): at the discretion of anesthesiologist.  7. Other measures: Will provide clearance once reviewing results. Patient will also obtain cardiac clearance.  1/18/24: Labs (CBC, CMP) stable with the exception of slightly decreased platelets. EKG showed sinus rhythm with frequent PVCs and possible left atrial enlargement - stable  when compared to previous EKGs. Patient cleared for surgery from primary care perspective. Also recommend obtaining cardiac clearance prior to proceeding with surgery.     Follow up if symptoms worsen or fail to improve.    Kenzie Hwang PA-C  _____________________________________________________________________________________________________________________________________________________    CC: preop clearance    HPI: Patient is in clinic today as an established patient here for preop clearance to have an A1 pulley release of R trigger fingers. The physician that is performing the surgery is Dr. White at [facility]. The surgery is being planned for TBD. The patient states that there has not been previous problems with sedation. He has had prior surgeries in the past with no adverse effects from anesthesia. He is not currently taking a blood thinner. He was recently on Plavix after angioplasty in April 2023, but stopped the medication about 1 week ago. Per cardiology recommendations - OK to stop six months after angioplasty. He has no history of blood clots or bleeding disorders. Patient is a nonsmoker. No history of BETHEL. He has a history of CAD, HTN, HLD, and CKD. There is no history of PE, DVT, arrhythmia, CHF, and/or COPD. No other complaints today.     Lab Results   Component Value Date    WBC 4.21 01/16/2024    HGB 11.4 (L) 01/16/2024    HCT 35.9 (L) 01/16/2024    MCV 92 01/16/2024     (L) 01/16/2024     Sodium   Date Value Ref Range Status   01/16/2024 139 136 - 145 mmol/L Final     Potassium   Date Value Ref Range Status   01/16/2024 3.9 3.5 - 5.1 mmol/L Final     Chloride   Date Value Ref Range Status   01/16/2024 106 95 - 110 mmol/L Final     CO2   Date Value Ref Range Status   01/16/2024 27 23 - 29 mmol/L Final     Glucose   Date Value Ref Range Status   01/16/2024 95 70 - 110 mg/dL Final     BUN   Date Value Ref Range Status   01/16/2024 17 8 - 23 mg/dL Final     Creatinine   Date Value Ref Range  Status   01/16/2024 1.2 0.5 - 1.4 mg/dL Final     Calcium   Date Value Ref Range Status   01/16/2024 8.5 (L) 8.7 - 10.5 mg/dL Final     Total Protein   Date Value Ref Range Status   01/16/2024 6.4 6.0 - 8.4 g/dL Final     Albumin   Date Value Ref Range Status   01/16/2024 3.6 3.5 - 5.2 g/dL Final     Total Bilirubin   Date Value Ref Range Status   01/16/2024 0.3 0.1 - 1.0 mg/dL Final     Comment:     For infants and newborns, interpretation of results should be based  on gestational age, weight and in agreement with clinical  observations.    Premature Infant recommended reference ranges:  Up to 24 hours.............<8.0 mg/dL  Up to 48 hours............<12.0 mg/dL  3-5 days..................<15.0 mg/dL  6-29 days.................<15.0 mg/dL       Alkaline Phosphatase   Date Value Ref Range Status   01/16/2024 83 55 - 135 U/L Final     AST   Date Value Ref Range Status   01/16/2024 25 10 - 40 U/L Final     ALT   Date Value Ref Range Status   01/16/2024 25 10 - 44 U/L Final     Anion Gap   Date Value Ref Range Status   01/16/2024 6 (L) 8 - 16 mmol/L Final     eGFR   Date Value Ref Range Status   01/16/2024 >60.0 >60 mL/min/1.73 m^2 Final     Past Medical History:   Diagnosis Date    BPH (benign prostatic hypertrophy) with urinary obstruction     CAD (coronary artery disease) May and Yarely 2011    x2    Chronic pain     DDD (degenerative disc disease), lumbar     Depression, major, in remission     Hyperlipidemia LDL goal < 100     Hypertension     MRSA cellulitis     Osteoarthritis     Trigger finger, right ring finger 7/17/2017     Past Surgical History:   Procedure Laterality Date    ANGIOGRAM, CORONARY, WITH LEFT HEART CATHETERIZATION N/A 4/12/2023    Procedure: Angiogram, Coronary, with Left Heart Cath;  Surgeon: Parth Barros MD;  Location: [facility] CATH;  Service: Cardiology;  Laterality: N/A;    COLONOSCOPY N/A 12/17/2019    Procedure: COLONOSCOPY;  Surgeon: Diann Negrete MD;  Location: [facility];   Service: Endoscopy;  Laterality: N/A;    CORONARY ANGIOPLASTY      2 Stents placed a few year ago    Epidural steroid injection      Pain management    INJURY      JOINT REPLACEMENT  2022    KNEE SURGERY      Right, x2    LARYNGOSCOPY Bilateral 2022    Procedure: LARYNGOSCOPY;  Surgeon: Wandy Dan MD;  Location: [facility] OR;  Service: ENT;  Laterality: Bilateral;    MANDIBLE FRACTURE SURGERY      accident during  service    PERCUTANEOUS CORONARY INTERVENTION, ARTERY N/A 2023    Procedure: Percutaneous coronary intervention;  Surgeon: Parth Barros MD;  Location: [facility] CATH;  Service: Cardiology;  Laterality: N/A;    REPAIR OF LIGAMENT OF SHOULDER Right     ROTATOR CUFF REPAIR      right times 2    VASECTOMY      VOCAL FOLD LESION EXCISION Bilateral 2022    Procedure: EXCISION, LESION, VOCAL CORD;  Surgeon: Wandy Dan MD;  Location: [facility] OR;  Service: ENT;  Laterality: Bilateral;     Review of patient's allergies indicates:   Allergen Reactions    Acetaminophen-codeine Rash     #3     Social History     Tobacco Use    Smoking status: Former     Current packs/day: 0.00     Average packs/day: 1 pack/day for 25.0 years (25.0 ttl pk-yrs)     Types: Cigarettes     Start date: 1982     Quit date: 2007     Years since quittin.6    Smokeless tobacco: Never   Substance Use Topics    Alcohol use: No    Drug use: Never     Family History   Problem Relation Age of Onset    Heart disease Father     Vision loss Father     Emphysema Mother     Cancer Mother         unknown    Heart failure Mother     Arthritis Mother     COPD Mother     Heart disease Mother     Prostate cancer Brother     Diabetes Paternal Aunt      Current Outpatient Medications on File Prior to Visit   Medication Sig Dispense Refill    aspirin (ECOTRIN) 81 MG EC tablet Take 81 mg by mouth once daily.      atorvastatin (LIPITOR) 40 MG tablet TAKE 1 TABLET DAILY 90 tablet 3    buPROPion (WELLBUTRIN XL)  150 MG TB24 tablet Take 1 tablet (150 mg total) by mouth once daily. 90 tablet 3    diclofenac sodium (VOLTAREN) 1 % Gel Apply 2 g topically 2 (two) times daily as needed (joint pain). 450 g 1    morphine (MS CONTIN) 30 MG 12 hr tablet Take 30 mg by mouth 3 (three) times daily.      oxyCODONE-acetaminophen (PERCOCET)  mg per tablet       sertraline (ZOLOFT) 50 MG tablet Take 1 tablet (50 mg total) by mouth once daily. 90 tablet 3    tamsulosin (FLOMAX) 0.4 mg Cap TAKE 2 CAPSULES EVERY EVENING 180 capsule 3    tiZANidine (ZANAFLEX) 4 MG tablet Take 1 tablet by mouth daily as needed.      ALPRAZolam (XANAX) 0.25 MG tablet Take 0.25 mg by mouth every evening.      clopidogreL (PLAVIX) 75 mg tablet Take 1 tablet (75 mg total) by mouth once daily. (Patient not taking: Reported on 1/16/2024) 30 tablet 11    famotidine (PEPCID) 20 MG tablet Take 20 mg by mouth every evening.      lisinopriL (PRINIVIL,ZESTRIL) 2.5 MG tablet Take 1 tablet (2.5 mg total) by mouth once daily. 90 tablet 3    metoprolol succinate (TOPROL-XL) 25 MG 24 hr tablet TAKE ONE-HALF (1/2) TABLET DAILY (Patient not taking: Reported on 1/16/2024) 45 tablet 3    ranolazine (RANEXA) 500 MG Tb12 Take 1 tablet (500 mg total) by mouth 2 (two) times daily. (Patient not taking: Reported on 1/16/2024) 180 tablet 3     No current facility-administered medications on file prior to visit.       Review of Systems   Constitutional:  Negative for chills, diaphoresis, fatigue and fever.   HENT:  Negative for congestion, ear pain, postnasal drip, sinus pain and sore throat.    Eyes:  Negative for pain and redness.   Respiratory:  Negative for cough, chest tightness and shortness of breath.    Cardiovascular:  Negative for chest pain and leg swelling.   Gastrointestinal:  Negative for abdominal pain, constipation, diarrhea, nausea and vomiting.   Genitourinary:  Negative for dysuria and hematuria.   Musculoskeletal:  Negative for arthralgias and joint swelling.  "  Skin:  Negative for rash.   Neurological:  Negative for dizziness, syncope and headaches.   Psychiatric/Behavioral:  Negative for dysphoric mood. The patient is not nervous/anxious.        Vitals:    01/16/24 1047   BP: 138/74   Pulse: 83   Temp: 98.6 °F (37 °C)   Weight: 93.4 kg (206 lb)   Height: 5' 11" (1.803 m)       Wt Readings from Last 3 Encounters:   01/16/24 93.4 kg (206 lb)   08/31/23 92.1 kg (203 lb)   05/03/23 90.6 kg (199 lb 11.8 oz)       Physical Exam  Constitutional:       General: He is not in acute distress.     Appearance: Normal appearance. He is well-developed.   HENT:      Head: Normocephalic and atraumatic.   Eyes:      Conjunctiva/sclera: Conjunctivae normal.   Cardiovascular:      Rate and Rhythm: Normal rate and regular rhythm.      Pulses: Normal pulses.      Heart sounds: Normal heart sounds. No murmur heard.  Pulmonary:      Effort: Pulmonary effort is normal. No respiratory distress.      Breath sounds: Normal breath sounds.   Abdominal:      General: Bowel sounds are normal. There is no distension.      Palpations: Abdomen is soft.      Tenderness: There is no abdominal tenderness.   Musculoskeletal:         General: Normal range of motion.      Cervical back: Normal range of motion and neck supple.   Skin:     General: Skin is warm and dry.      Findings: No rash.   Neurological:      General: No focal deficit present.      Mental Status: He is alert and oriented to person, place, and time.   Psychiatric:         Mood and Affect: Mood normal.         Behavior: Behavior normal.         Health Maintenance   Topic Date Due    Shingles Vaccine (1 of 2) Never done    Colorectal Cancer Screening  12/17/2022    High Dose Statin  08/31/2024    Lipid Panel  10/26/2024    TETANUS VACCINE  10/15/2028    Hepatitis C Screening  Completed    Abdominal Aortic Aneurysm Screening  Completed     "

## 2024-01-16 NOTE — Clinical Note
I saw this patient today for preop clearance. I just wanted to make sure he is cleared to proceed with surgery from a cardiac standpoint. He told me that ortho did send over paperwork to your office. His EKG appeared to be stable today in the clinic. He stopped Plavix last week per Dr. Barros's recommendations (6 months after angioplasty). Please let me know. Thank you! 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 The patient was located at his  home address in [place]. The appropriate consent was obtained prior to the conference.  The primary purpose of the meeting was to discuss his genitourinary issues.\par \par Dr. Gallego underwent a prostate MRI to evaluate a PSA elevation (see below). \par \par From his general urologic history Dr. Gallego reports moderate stable urinary symptoms (predominantly obstructive) without nocturia.\par \par Dr. Gallego has mild erectile dysfunction.  He has used Viagra 50mg in the past.\par \par He is taking DHEA for hypogonadism.\par \par PSAs: 4/15/22--4.88*;  3/10/22--4.9; 2/16/21--2.8; 2/25/20--2.9; 12/18/18--3.5; 11/09/17--4.0; 10/17/16--2.9; 5/19/15--3.0; \par *PSAD: 0.10\par \par MRI: 4/27/22--No lesion suspicious for clinically significant prostate cancer identified. PI-RADS 2, 48cc prostate

## 2024-01-18 ENCOUNTER — TELEPHONE (OUTPATIENT)
Dept: FAMILY MEDICINE | Facility: CLINIC | Age: 74
End: 2024-01-18
Payer: MEDICARE

## 2024-01-18 DIAGNOSIS — D69.6 THROMBOCYTOPENIA: Primary | ICD-10-CM

## 2024-01-18 NOTE — TELEPHONE ENCOUNTER
Saw patient for preop on 1/16/24. His CBC and CMP were stable with the exception of slightly decreased platelets. I would like to repeat this labs in 3-4 weeks. EKG was stable. He is cleared from primary care perspective. He just need to obtain clearance from his cardiologist. I have completed his paperwork. Please fax w/ my note.

## 2024-01-18 NOTE — TELEPHONE ENCOUNTER
----- Message from Emma Nguyen sent at 1/18/2024  2:04 PM CST -----  Regarding: self [phone]  .Type:  Patient Returning Call    Who Called: self    Who Left Message for Patient: N/A    Does the patient know what this is regarding?no    Would the patient rather a call back or a response via [facility]? Call back     Best Call Back Number: [phone]

## 2024-01-18 NOTE — PROGRESS NOTES
Results have been released via [employer]. Please verify that these have been viewed by patient. If not, please call patient with results.     Please schedule the following orders: repeat CBC in 3-4 weeks    I have sent a message to them with the following interpretation (see below).    I have reviewed your recent blood work.     CBC NORMAL-The CBC appears to be stable at this time with no sign of major anemia or abnormal white count. Platelets were a little low. We can repeat this in a few weeks.   CMP/BMP NORMAL-The electrolytes all appear stable at this time. This includes kidney functions along with routine electrolytes like sugar, potassium and sodium. The liver enzymes were noted to be stable also.    Please do not hesitate to call or message with any additional questions or concerns.    Kenzie Hwang PA-C

## 2024-01-18 NOTE — TELEPHONE ENCOUNTER
Attempted to call pt via telephone with the following results:   No answer, left voicemail for a call back.  Note and clearance has been faxed over to provided number. Papers left a front.

## 2024-01-22 ENCOUNTER — PATIENT MESSAGE (OUTPATIENT)
Dept: CARDIOLOGY | Facility: CLINIC | Age: 74
End: 2024-01-22
Payer: MEDICARE

## 2024-01-22 ENCOUNTER — TELEPHONE (OUTPATIENT)
Dept: CARDIOLOGY | Facility: CLINIC | Age: 74
End: 2024-01-22
Payer: MEDICARE

## 2024-01-22 NOTE — TELEPHONE ENCOUNTER
Cardiac Clearance  Procedure- A1 pulley release for right middle and small finger  Anesthesia- Local  Pt is on plavix but NOT taking    [facility]  Phone: [phone]  Fax: [phone]

## 2024-02-01 DIAGNOSIS — F32.5 DEPRESSION, MAJOR, IN REMISSION: ICD-10-CM

## 2024-02-01 RX ORDER — SERTRALINE HYDROCHLORIDE 50 MG/1
50 TABLET, FILM COATED ORAL
Qty: 90 TABLET | Refills: 1 | Status: SHIPPED | OUTPATIENT
Start: 2024-02-01 | End: 2024-03-04 | Stop reason: SDUPTHER

## 2024-02-01 NOTE — TELEPHONE ENCOUNTER
No care due was identified.  [facility] Embedded Care Due Messages. Reference number: [id number].   2/01/2024 9:04:35 AM CST

## 2024-02-21 ENCOUNTER — LAB VISIT (OUTPATIENT)
Dept: LAB | Facility: HOSPITAL | Age: 74
End: 2024-02-21
Attending: PHYSICIAN ASSISTANT
Payer: MEDICARE

## 2024-02-21 DIAGNOSIS — D69.6 THROMBOCYTOPENIA: ICD-10-CM

## 2024-02-21 LAB
BASOPHILS # BLD AUTO: 0.04 K/UL (ref 0–0.2)
BASOPHILS NFR BLD: 0.7 % (ref 0–1.9)
DIFFERENTIAL METHOD BLD: ABNORMAL
EOSINOPHIL # BLD AUTO: 0.2 K/UL (ref 0–0.5)
EOSINOPHIL NFR BLD: 3.8 % (ref 0–8)
ERYTHROCYTE [DISTWIDTH] IN BLOOD BY AUTOMATED COUNT: 13.4 % (ref 11.5–14.5)
HCT VFR BLD AUTO: 41.2 % (ref 40–54)
HGB BLD-MCNC: 12.7 G/DL (ref 14–18)
IMM GRANULOCYTES # BLD AUTO: 0.01 K/UL (ref 0–0.04)
IMM GRANULOCYTES NFR BLD AUTO: 0.2 % (ref 0–0.5)
LYMPHOCYTES # BLD AUTO: 1.8 K/UL (ref 1–4.8)
LYMPHOCYTES NFR BLD: 29.6 % (ref 18–48)
MCH RBC QN AUTO: 28.3 PG (ref 27–31)
MCHC RBC AUTO-ENTMCNC: 30.8 G/DL (ref 32–36)
MCV RBC AUTO: 92 FL (ref 82–98)
MONOCYTES # BLD AUTO: 0.5 K/UL (ref 0.3–1)
MONOCYTES NFR BLD: 7.8 % (ref 4–15)
NEUTROPHILS # BLD AUTO: 3.5 K/UL (ref 1.8–7.7)
NEUTROPHILS NFR BLD: 57.9 % (ref 38–73)
NRBC BLD-RTO: 0 /100 WBC
PLATELET # BLD AUTO: 167 K/UL (ref 150–450)
PMV BLD AUTO: 10.5 FL (ref 9.2–12.9)
RBC # BLD AUTO: 4.48 M/UL (ref 4.6–6.2)
WBC # BLD AUTO: 6.04 K/UL (ref 3.9–12.7)

## 2024-02-21 PROCEDURE — 36415 COLL VENOUS BLD VENIPUNCTURE: CPT | Mod: PO | Performed by: PHYSICIAN ASSISTANT

## 2024-02-21 PROCEDURE — 85025 COMPLETE CBC W/AUTO DIFF WBC: CPT | Performed by: PHYSICIAN ASSISTANT

## 2024-02-22 NOTE — PROGRESS NOTES
Results have been released via [employer]. Please verify that these have been viewed by patient. If not, please call patient with results.     I have sent a message to them with the following interpretation (see below).    I have reviewed your recent blood work.     CBC NORMAL-The CBC appears to be stable at this time with no sign of major anemia, abnormal white count or platelet abnormality.    Please do not hesitate to call or message with any additional questions or concerns.    Kenzie Hwang PA-C

## 2024-03-04 ENCOUNTER — OFFICE VISIT (OUTPATIENT)
Dept: FAMILY MEDICINE | Facility: CLINIC | Age: 74
End: 2024-03-04
Payer: MEDICARE

## 2024-03-04 DIAGNOSIS — N25.81 SECONDARY HYPERPARATHYROIDISM: ICD-10-CM

## 2024-03-04 DIAGNOSIS — F32.5 DEPRESSION, MAJOR, IN REMISSION: Primary | ICD-10-CM

## 2024-03-04 DIAGNOSIS — I25.118 CORONARY ARTERY DISEASE OF NATIVE ARTERY OF NATIVE HEART WITH STABLE ANGINA PECTORIS: ICD-10-CM

## 2024-03-04 DIAGNOSIS — N18.31 STAGE 3A CHRONIC KIDNEY DISEASE: ICD-10-CM

## 2024-03-04 DIAGNOSIS — M51.36 DDD (DEGENERATIVE DISC DISEASE), LUMBAR: ICD-10-CM

## 2024-03-04 DIAGNOSIS — M79.632 LEFT FOREARM PAIN: ICD-10-CM

## 2024-03-04 DIAGNOSIS — E55.9 VITAMIN D DEFICIENCY: ICD-10-CM

## 2024-03-04 DIAGNOSIS — E78.5 HYPERLIPIDEMIA WITH TARGET LDL LESS THAN 100: ICD-10-CM

## 2024-03-04 DIAGNOSIS — I10 PRIMARY HYPERTENSION: ICD-10-CM

## 2024-03-04 PROCEDURE — 99214 OFFICE O/P EST MOD 30 MIN: CPT | Mod: 95,,, | Performed by: PHYSICIAN ASSISTANT

## 2024-03-04 RX ORDER — SERTRALINE HYDROCHLORIDE 50 MG/1
50 TABLET, FILM COATED ORAL DAILY
Qty: 90 TABLET | Refills: 3 | Status: SHIPPED | OUTPATIENT
Start: 2024-03-04 | End: 2024-05-30

## 2024-03-04 RX ORDER — ATORVASTATIN CALCIUM 40 MG/1
40 TABLET, FILM COATED ORAL DAILY
Qty: 90 TABLET | Refills: 3 | Status: SHIPPED | OUTPATIENT
Start: 2024-03-04

## 2024-03-04 NOTE — PROGRESS NOTES
Primary Care Telemedicine Note  The patient location is: Patient Home   The chief complaint leading to consultation is: Medication refill  Total time spent with patient: 15 minutes    Visit type: Virtual visit with synchronous audio only and video  Each patient to whom he or she provides medical services by telemedicine is: (1) informed of the relationship between the physician and patient and the respective role of any other health care provider with respect to management of the patient; and (2) notified that he or she may decline to receive medical services by telemedicine and may withdraw from such care at any time.      Assessment/Plan:    Problem List Items Addressed This Visit          Neuro    DDD (degenerative disc disease), lumbar    Overview     -managed by pain management, Dr. Giron  -remains on chronic opiates via pain clinic  -also has PRN muscle relaxants            Psychiatric    Depression, major, in remission - Primary    Overview     -symptoms stable  -recent loss of wife but coping as well as expected  -currently on wellbutrin  mg and zoloft 50 mg QD  -of note, hx of AE with both cymbalta and lexapro  -denies AE of current medications  -discussed risk of discontinuing this medication without tapering  -patient will follow up routinely and notify us if having any side effects or worsening or persistent symptoms. ER precautions were given.         Relevant Medications    sertraline (ZOLOFT) 50 MG tablet       Cardiac/Vascular    Hyperlipidemia with target LDL less than 100    Overview     Hyperlipidemia Medications               atorvastatin (LIPITOR) 40 MG tablet TAKE 1 TABLET DAILY        -chronic condition. Currently stable.    -has not been taking statin; new Rx sent to patient's pharmacy  -denies any known adverse effects of medications  -most recent labs listed below:  Lab Results   Component Value Date    CHOL 221 (H) 10/26/2023     Lab Results   Component Value Date    HDL 53  10/26/2023     Lab Results   Component Value Date    LDLCALC 145.6 10/26/2023     Lab Results   Component Value Date    TRIG 112 10/26/2023     Lab Results   Component Value Date    ALT 25 01/16/2024    AST 25 01/16/2024    ALKPHOS 83 01/16/2024    BILITOT 0.3 01/16/2024            Relevant Medications    atorvastatin (LIPITOR) 40 MG tablet    Hypertension    Overview     Hypertension Medications               metoprolol succinate (TOPROL-XL) 25 MG 24 hr tablet TAKE ONE-HALF (1/2) TABLET DAILY        -at goal today  -continue lifestyle modification with low sodium diet and exercise   -discussed hypertension disease course and importance of treating high blood pressure  -patient understood and advised of risk of untreated blood pressure. ER precautions were given   for symptoms of hypertensive urgency and emergency.          Coronary artery disease of native artery of native heart with stable angina pectoris    Overview     -s/p multiple stents placed; last in April 2023  -followed by Dr. Barros   -stopped Plavix per cardiology recommendations  -also no longer taking ASA/statin; advised patient to restart these medications  -currently asymptomatic         Relevant Medications    atorvastatin (LIPITOR) 40 MG tablet       Renal/    Stage 3a chronic kidney disease    Overview     -labs remain stable  -renally dose medication  -avoid nephrotoxic agents, including NSAIDs            Endocrine    Secondary hyperparathyroidism    Overview     -2/2 vit d def         Vitamin D deficiency    Overview     -last vitamin D low  -start 1000 units of vitamin d3 daily          Other Visit Diagnoses       Left forearm pain        Relevant Orders    Ambulatory referral/consult to Orthopedics            Follow up in about 6 months (around 9/4/2024), or if symptoms worsen or fail to improve.    Kenzie Hwang,  FELISHA  _____________________________________________________________________________________________________________________________________________________    CC: medication refill    HPI: Patient is an established patient who presents today via virtual visit for medication refill.    Chronic conditions have been reviewed and remain stable. Further detail as stated above.     Today, patient reports L forearm pain. Symptoms have been ongoing for several months. Denies previous trauma or injuries. Pain starts in his L wrist and radiates along his forearm to his L elbow. He has limited ROM and stated that he is unable to extend his L arm due to pain. He has not tried anything for relief of symptoms.     HM reviewed. Patient due for c-scope. He declined at this time.    No other complaints today.     Past Medical History:   Diagnosis Date    BPH (benign prostatic hypertrophy) with urinary obstruction     CAD (coronary artery disease) May and Yarely 2011    x2    Chronic pain     DDD (degenerative disc disease), lumbar     Depression, major, in remission     Hyperlipidemia LDL goal < 100     Hypertension     MRSA cellulitis     Osteoarthritis     Trigger finger, right ring finger 7/17/2017     Past Surgical History:   Procedure Laterality Date    ANGIOGRAM, CORONARY, WITH LEFT HEART CATHETERIZATION N/A 4/12/2023    Procedure: Angiogram, Coronary, with Left Heart Cath;  Surgeon: Parth Barros MD;  Location: [facility] CATH;  Service: Cardiology;  Laterality: N/A;    COLONOSCOPY N/A 12/17/2019    Procedure: COLONOSCOPY;  Surgeon: Diann Negrete MD;  Location: [facility] ENDO;  Service: Endoscopy;  Laterality: N/A;    CORONARY ANGIOPLASTY      2 Stents placed a few year ago    Epidural steroid injection      Pain management    INJURY      JOINT REPLACEMENT  Jan 18, 2022    KNEE SURGERY      Right, x2    LARYNGOSCOPY Bilateral 11/9/2022    Procedure: LARYNGOSCOPY;  Surgeon: Wandy Dan MD;  Location: [facility] OR;  Service:  ENT;  Laterality: Bilateral;    MANDIBLE FRACTURE SURGERY      accident during  service    PERCUTANEOUS CORONARY INTERVENTION, ARTERY N/A 2023    Procedure: Percutaneous coronary intervention;  Surgeon: Parth Barros MD;  Location: [facility];  Service: Cardiology;  Laterality: N/A;    REPAIR OF LIGAMENT OF SHOULDER Right     ROTATOR CUFF REPAIR      right times 2    VASECTOMY      VOCAL FOLD LESION EXCISION Bilateral 2022    Procedure: EXCISION, LESION, VOCAL CORD;  Surgeon: Wandy Dan MD;  Location: [facility] OR;  Service: ENT;  Laterality: Bilateral;     Review of patient's allergies indicates:   Allergen Reactions    Acetaminophen-codeine Rash     #3     Social History     Tobacco Use    Smoking status: Former     Current packs/day: 0.00     Average packs/day: 1 pack/day for 25.0 years (25.0 ttl pk-yrs)     Types: Cigarettes     Start date: 1982     Quit date: 2007     Years since quittin.8    Smokeless tobacco: Never   Substance Use Topics    Alcohol use: No    Drug use: Never     Family History   Problem Relation Age of Onset    Heart disease Father     Vision loss Father     Emphysema Mother     Cancer Mother         unknown    Heart failure Mother     Arthritis Mother     COPD Mother     Heart disease Mother     Prostate cancer Brother     Diabetes Paternal Aunt      Current Outpatient Medications on File Prior to Visit   Medication Sig Dispense Refill    aspirin (ECOTRIN) 81 MG EC tablet Take 81 mg by mouth once daily.      buPROPion (WELLBUTRIN XL) 150 MG TB24 tablet Take 1 tablet (150 mg total) by mouth once daily. 90 tablet 3    diclofenac sodium (VOLTAREN) 1 % Gel Apply 2 g topically 2 (two) times daily as needed (joint pain). 450 g 1    famotidine (PEPCID) 20 MG tablet Take 20 mg by mouth every evening.      metoprolol succinate (TOPROL-XL) 25 MG 24 hr tablet TAKE ONE-HALF (1/2) TABLET DAILY (Patient not taking: Reported on 2024) 45 tablet 3    morphine  (MS CONTIN) 30 MG 12 hr tablet Take 30 mg by mouth 3 (three) times daily.      oxyCODONE-acetaminophen (PERCOCET)  mg per tablet       tamsulosin (FLOMAX) 0.4 mg Cap TAKE 2 CAPSULES EVERY EVENING 180 capsule 3    tiZANidine (ZANAFLEX) 4 MG tablet Take 1 tablet by mouth daily as needed.      [DISCONTINUED] ALPRAZolam (XANAX) 0.25 MG tablet Take 0.25 mg by mouth every evening.      [DISCONTINUED] atorvastatin (LIPITOR) 40 MG tablet TAKE 1 TABLET DAILY 90 tablet 3    [DISCONTINUED] clopidogreL (PLAVIX) 75 mg tablet Take 1 tablet (75 mg total) by mouth once daily. (Patient not taking: Reported on 1/16/2024) 30 tablet 11    [DISCONTINUED] lisinopriL (PRINIVIL,ZESTRIL) 2.5 MG tablet Take 1 tablet (2.5 mg total) by mouth once daily. 90 tablet 3    [DISCONTINUED] ranolazine (RANEXA) 500 MG Tb12 Take 1 tablet (500 mg total) by mouth 2 (two) times daily. (Patient not taking: Reported on 1/16/2024) 180 tablet 3    [DISCONTINUED] sertraline (ZOLOFT) 50 MG tablet TAKE 1 TABLET BY MOUTH ONCE DAILY 90 tablet 1     No current facility-administered medications on file prior to visit.       Review of Systems   Constitutional:  Negative for chills, fever and malaise/fatigue.   HENT:  Negative for hearing loss.    Eyes:  Negative for discharge.   Respiratory:  Negative for cough, shortness of breath and wheezing.    Cardiovascular:  Negative for chest pain, palpitations and leg swelling.   Gastrointestinal:  Negative for abdominal pain, blood in stool, constipation, diarrhea and vomiting.   Genitourinary:  Negative for dysuria, frequency and hematuria.   Musculoskeletal:  Negative for back pain, joint pain and neck pain.   Neurological:  Negative for weakness and headaches.   Endo/Heme/Allergies:  Negative for polydipsia.   Psychiatric/Behavioral:  Negative for depression. The patient is not nervous/anxious.          Physical Exam  Constitutional:       General: He is not in acute distress.     Appearance: He is well-developed.    HENT:      Head: Normocephalic and atraumatic.   Pulmonary:      Effort: Pulmonary effort is normal. No respiratory distress.   Abdominal:      General: There is no distension.   Musculoskeletal:         General: Normal range of motion.      Cervical back: Normal range of motion.   Neurological:      Mental Status: He is alert and oriented to person, place, and time.   Psychiatric:         Mood and Affect: Mood normal.         Behavior: Behavior normal.         The patient's Health Maintenance was reviewed and the following appears to be due at this time:  Health Maintenance Due   Topic Date Due    Shingles Vaccine (1 of 2) Never done    RSV Vaccine (Age 60+ and Pregnant patients) (1 - 1-dose 60+ series) Never done    Colorectal Cancer Screening  12/17/2022    COVID-19 Vaccine (9 - 2023-24 season) 09/01/2023

## 2024-03-05 DIAGNOSIS — M25.512 LEFT SHOULDER PAIN, UNSPECIFIED CHRONICITY: Primary | ICD-10-CM

## 2024-03-05 DIAGNOSIS — M79.602 LEFT ARM PAIN: ICD-10-CM

## 2024-03-05 DIAGNOSIS — M25.522 LEFT ELBOW PAIN: ICD-10-CM

## 2024-03-11 ENCOUNTER — OFFICE VISIT (OUTPATIENT)
Dept: ORTHOPEDICS | Facility: CLINIC | Age: 74
End: 2024-03-11
Payer: MEDICARE

## 2024-03-11 ENCOUNTER — HOSPITAL ENCOUNTER (OUTPATIENT)
Dept: RADIOLOGY | Facility: HOSPITAL | Age: 74
Discharge: HOME | End: 2024-03-11
Attending: STUDENT IN AN ORGANIZED HEALTH CARE EDUCATION/TRAINING PROGRAM
Payer: MEDICARE

## 2024-03-11 DIAGNOSIS — M25.522 LEFT ELBOW PAIN: ICD-10-CM

## 2024-03-11 DIAGNOSIS — M79.632 LEFT FOREARM PAIN: ICD-10-CM

## 2024-03-11 DIAGNOSIS — M25.522 LEFT ELBOW PAIN: Primary | ICD-10-CM

## 2024-03-11 DIAGNOSIS — M79.602 LEFT ARM PAIN: ICD-10-CM

## 2024-03-11 DIAGNOSIS — S46.212A RUPTURE OF LEFT BICEPS TENDON, INITIAL ENCOUNTER: ICD-10-CM

## 2024-03-11 PROCEDURE — 73060 X-RAY EXAM OF HUMERUS: CPT | Mod: TC,PO,LT

## 2024-03-11 PROCEDURE — 73080 X-RAY EXAM OF ELBOW: CPT | Mod: 26,LT,, | Performed by: RADIOLOGY

## 2024-03-11 PROCEDURE — 73060 X-RAY EXAM OF HUMERUS: CPT | Mod: 26,LT,, | Performed by: RADIOLOGY

## 2024-03-11 PROCEDURE — 99999 PR PBB SHADOW E&M-EST. PATIENT-LVL III: CPT | Mod: PBBFAC,,, | Performed by: STUDENT IN AN ORGANIZED HEALTH CARE EDUCATION/TRAINING PROGRAM

## 2024-03-11 PROCEDURE — 99213 OFFICE O/P EST LOW 20 MIN: CPT | Mod: PBBFAC,25,PO | Performed by: STUDENT IN AN ORGANIZED HEALTH CARE EDUCATION/TRAINING PROGRAM

## 2024-03-11 PROCEDURE — 99204 OFFICE O/P NEW MOD 45 MIN: CPT | Mod: S$PBB,,, | Performed by: STUDENT IN AN ORGANIZED HEALTH CARE EDUCATION/TRAINING PROGRAM

## 2024-03-11 PROCEDURE — 73080 X-RAY EXAM OF ELBOW: CPT | Mod: TC,PO,LT

## 2024-03-11 NOTE — PROGRESS NOTES
Patient ID: Luigi Murillo  YOB: 1950  MRN: [id number]    Chief Complaint: Left arm pain      Referred By: Kenzie Hwang PA-C    History of Present Illness: Luigi Murillo is a right-hand dominant 73 y.o. male who presents today with left arm pain.  Symptoms have been ongoing for several months. Denies previous trauma or injuries. Pain starts in his L wrist and radiates along his forearm to his L elbow. He has limited ROM and stated that he is unable to extend his L arm due to pain. Redness and swelling is noted at the bicep area, patient says that he doesn't now if something bit him or not. He has not tried anything for relief of symptoms. Pain 9/10 on pain scale    The patient is active in none.  Occupation: none        Past Medical History:   Past Medical History:   Diagnosis Date    BPH (benign prostatic hypertrophy) with urinary obstruction     CAD (coronary artery disease) May and Yarely 2011    x2    Chronic pain     DDD (degenerative disc disease), lumbar     Depression, major, in remission     Hyperlipidemia LDL goal < 100     Hypertension     MRSA cellulitis     Osteoarthritis     Trigger finger, right ring finger 7/17/2017     Past Surgical History:   Procedure Laterality Date    ANGIOGRAM, CORONARY, WITH LEFT HEART CATHETERIZATION N/A 4/12/2023    Procedure: Angiogram, Coronary, with Left Heart Cath;  Surgeon: Parth Barros MD;  Location: [facility] CATH;  Service: Cardiology;  Laterality: N/A;    COLONOSCOPY N/A 12/17/2019    Procedure: COLONOSCOPY;  Surgeon: Dinan Negrete MD;  Location: [facility] ENDO;  Service: Endoscopy;  Laterality: N/A;    CORONARY ANGIOPLASTY      2 Stents placed a few year ago    Epidural steroid injection      Pain management    INJURY      JOINT REPLACEMENT  Jan 18, 2022    KNEE SURGERY      Right, x2    LARYNGOSCOPY Bilateral 11/9/2022    Procedure: LARYNGOSCOPY;  Surgeon: Wandy Dan MD;  Location: [facility] OR;  Service: ENT;  Laterality:  Bilateral;    MANDIBLE FRACTURE SURGERY      accident during  service    PERCUTANEOUS CORONARY INTERVENTION, ARTERY N/A 2023    Procedure: Percutaneous coronary intervention;  Surgeon: Parth Barros MD;  Location: [facility];  Service: Cardiology;  Laterality: N/A;    REPAIR OF LIGAMENT OF SHOULDER Right     ROTATOR CUFF REPAIR      right times 2    VASECTOMY      VOCAL FOLD LESION EXCISION Bilateral 2022    Procedure: EXCISION, LESION, VOCAL CORD;  Surgeon: Wandy Dan MD;  Location: [facility] OR;  Service: ENT;  Laterality: Bilateral;     Family History   Problem Relation Age of Onset    Heart disease Father     Vision loss Father     Emphysema Mother     Cancer Mother         unknown    Heart failure Mother     Arthritis Mother     COPD Mother     Heart disease Mother     Prostate cancer Brother     Diabetes Paternal Aunt      Social History     Socioeconomic History    Marital status:    Tobacco Use    Smoking status: Former     Current packs/day: 0.00     Average packs/day: 1 pack/day for 25.0 years (25.0 ttl pk-yrs)     Types: Cigarettes     Start date: 1982     Quit date: 2007     Years since quittin.8    Smokeless tobacco: Never   Substance and Sexual Activity    Alcohol use: No    Drug use: Never    Sexual activity: Yes     Partners: Female     Social Determinants of Health     Financial Resource Strain: Low Risk  (3/3/2024)    Overall Financial Resource Strain (CARDIA)     Difficulty of Paying Living Expenses: Not hard at all   Food Insecurity: No Food Insecurity (3/3/2024)    Hunger Vital Sign     Worried About Running Out of Food in the Last Year: Never true     Ran Out of Food in the Last Year: Never true   Transportation Needs: No Transportation Needs (3/3/2024)    PRAPARE - Transportation     Lack of Transportation (Medical): No     Lack of Transportation (Non-Medical): No   Physical Activity: Sufficiently Active (3/3/2024)    Exercise Vital Sign      Days of Exercise per Week: 5 days     Minutes of Exercise per Session: 60 min   Stress: Stress Concern Present (3/3/2024)    [place] [employer] - Occupational Stress Questionnaire     Feeling of Stress : Very much   Social Connections: Unknown (3/3/2024)    Social Connection and Isolation Panel [NHANES]     Frequency of Communication with Friends and Family: More than three times a week     Frequency of Social Gatherings with Friends and Family: Once a week     Active Member of Clubs or Organizations: No     Marital Status:    Housing Stability: Low Risk  (3/3/2024)    Housing Stability Vital Sign     Unable to Pay for Housing in the Last Year: No     Number of Places Lived in the Last Year: 1     Unstable Housing in the Last Year: No     Medication List with Changes/Refills   Current Medications    ASPIRIN (ECOTRIN) 81 MG EC TABLET    Take 81 mg by mouth once daily.    ATORVASTATIN (LIPITOR) 40 MG TABLET    Take 1 tablet (40 mg total) by mouth once daily.    BUPROPION (WELLBUTRIN XL) 150 MG TB24 TABLET    Take 1 tablet (150 mg total) by mouth once daily.    DICLOFENAC SODIUM (VOLTAREN) 1 % GEL    Apply 2 g topically 2 (two) times daily as needed (joint pain).    FAMOTIDINE (PEPCID) 20 MG TABLET    Take 20 mg by mouth every evening.    METOPROLOL SUCCINATE (TOPROL-XL) 25 MG 24 HR TABLET    TAKE ONE-HALF (1/2) TABLET DAILY    MORPHINE (MS CONTIN) 30 MG 12 HR TABLET    Take 30 mg by mouth 3 (three) times daily.    OXYCODONE-ACETAMINOPHEN (PERCOCET)  MG PER TABLET        SERTRALINE (ZOLOFT) 50 MG TABLET    Take 1 tablet (50 mg total) by mouth once daily.    TAMSULOSIN (FLOMAX) 0.4 MG CAP    TAKE 2 CAPSULES EVERY EVENING    TIZANIDINE (ZANAFLEX) 4 MG TABLET    Take 1 tablet by mouth daily as needed.     Review of patient's allergies indicates:   Allergen Reactions    Acetaminophen-codeine Rash     #3       Physical Exam:   There is no height or weight on file to calculate  BMI.    GENERAL: Well appearing, in no acute distress.  HEAD: Normocephalic and atraumatic.  ENT: External ears and nose grossly normal.  EYES: EOMI bilaterally  PULMONARY: Respirations are grossly even and non-labored.  NEURO: Awake, alert, and oriented x 3.  SKIN: No obvious rashes appreciated.  PSYCH: Mood & affect are appropriate.    Detailed MSK exam:     Left shoulder exam:   -ROM: abduction 130, forward flexion 130, external rotation 80, internal rotation 70  -empty can test negative, resisted ER negative, belly press negative  -liu test negative, neers test negative, whipple test negative  -biceps load test negative, yerguson test positive, Warren's test negative  -sensation intact, pulses 2+  -TTP: bicipital groove    Left elbow exam:   -TTP: Lateral epicondyle, Medial epicondyle, and Antecubital fossa  -ROM: extension +5, flexion 110  -Resisted wrist extension: positive  -Resisted 3rd finger extension: positive  -Resisted wrist flexion: negative  -Resisted pronation: positive  -Resisted supination: positive  -Sensation intact  -Pulses 2+  - strength 4+/5  -large ecchymosis over distal biceps      Imaging:  X-Ray Humerus 2 View Left  Narrative: EXAM: XR HUMERUS 2 VIEW LEFT    CLINICAL HISTORY: Left arm pain    FINDINGS:  No acute bony changes.  Impression:  No acute bony changes.    Finalized on: 3/11/2024 3:16 PM By:  Ross Taylor MD  [facility]# [id number]      2024-03-11 15:18:22.551    [facility]        Relevant imaging results were reviewed and interpreted by me and per my read shows no acute abnormalities.  This was discussed with the patient and / or family today.     Assessment:  Luigi Murillo is a 73 y.o. male presenting with left elbow/upper arm pain.   History, physical and radiographs are consistent with a likely diagnosis of possible distal biceps tendon tear vs soft tissue infection from bug bite possibly.   Plan: MRI ordered. Ice as needed. Consider shoulder/elbow referral pending MRI results.  Continue conservative management for pain.   Follow up after MRI to go over results and determine next steps in management. All questions answered.      Left elbow pain  -     MRI Elbow Joint Without Contrast Left; Future; Expected date: 03/11/2024    Left forearm pain  -     Ambulatory referral/consult to Orthopedics    Rupture of left biceps tendon, initial encounter  -     MRI Elbow Joint Without Contrast Left; Future; Expected date: 03/11/2024  -     MRI Humerus Without Contrast Left; Future; Expected date: 03/11/2024           A copy of today's visit note has been sent to the referring provider.     Electronically signed:  Terrell Hoang MD, MPH  03/11/2024  2:25 PM

## 2024-03-11 NOTE — PATIENT INSTRUCTIONS
Assessment:  Luigi Murillo is a 73 y.o. male No chief complaint on file.      Encounter Diagnosis   Name Primary?    Left forearm pain         Plan:  MRI Stat of left elbow and humerus at [facility]  Patient may ice every 2 hours for 15 minutes as needed to control pain and swelling.   Follow up after MRI     Follow-up: after MRI.    Thank you for choosing [facility] and Dr. Terrell Hoang for your orthopedic & sports medicine care. It is our goal to provide you with exceptional care that will help keep you healthy, active, and get you back in the game.    Please do not hesitate to reach out to us via email, phone, or MyChart with any questions, concerns, or feedback.    If you felt that you received exemplary care today, please consider leaving us feedback on [employer] at:  [email or web address]    If you are experiencing pain/discomfort ,or have questions after 5pm and would like to be connected to the [facility] on-call team, please call this number and specify which Sports Medicine provider is treating you: [phone]

## 2024-03-13 ENCOUNTER — PATIENT MESSAGE (OUTPATIENT)
Dept: ORTHOPEDICS | Facility: CLINIC | Age: 74
End: 2024-03-13
Payer: MEDICARE

## 2024-03-14 ENCOUNTER — TELEPHONE (OUTPATIENT)
Dept: ORTHOPEDICS | Facility: CLINIC | Age: 74
End: 2024-03-14
Payer: MEDICARE

## 2024-03-14 ENCOUNTER — TELEPHONE (OUTPATIENT)
Dept: SPORTS MEDICINE | Facility: CLINIC | Age: 74
End: 2024-03-14
Payer: MEDICARE

## 2024-03-14 DIAGNOSIS — M79.602 LEFT ARM PAIN: Primary | ICD-10-CM

## 2024-03-14 DIAGNOSIS — M25.522 LEFT ELBOW PAIN: ICD-10-CM

## 2024-03-14 DIAGNOSIS — M25.522 LEFT ELBOW PAIN: Primary | ICD-10-CM

## 2024-03-14 DIAGNOSIS — M25.512 LEFT SHOULDER PAIN, UNSPECIFIED CHRONICITY: ICD-10-CM

## 2024-03-14 DIAGNOSIS — M79.602 LEFT ARM PAIN: ICD-10-CM

## 2024-03-14 DIAGNOSIS — M25.512 LEFT SHOULDER PAIN, UNSPECIFIED CHRONICITY: Primary | ICD-10-CM

## 2024-03-14 NOTE — TELEPHONE ENCOUNTER
Called patient and let him know that he needs to be at [facility] for 1 pm on Friday, 3/15 for labs prior to MRI.  Patient verbalized understanding of instructions.

## 2024-03-14 NOTE — TELEPHONE ENCOUNTER
Spoke with Steven in Radiology, Radiologist stated that patient will need MRI of humerus to be with and without contrast and will need stat creatinine test done.  Our office will need to place order and contact patient to let him know to have test done at [facility] prior to MRI.   ----- Message from Steven Benavides sent at 3/14/2024  1:57 PM CDT -----  Good Afternoon,    Mr. Murillo will need to have a STAT Creatinine done before his MRI tomorrow at 2:00PM. Can you please put an order in to be scheduled?    Thank you,  Steven  Radiology Dept.

## 2024-03-14 NOTE — TELEPHONE ENCOUNTER
Spoke with Steven in radiology, order for MRI to humerus was supposed to be without contrast, so STAT creatii  ----- Message from Steven Benavides sent at 3/14/2024  1:57 PM CDT -----  Good Afternoon,    Mr. Murillo will need to have a STAT Creatinine done before his MRI tomorrow at 2:00PM. Can you please put an order in to be scheduled?    Thank you,  Steven  Radiology Dept.

## 2024-03-15 ENCOUNTER — HOSPITAL ENCOUNTER (OUTPATIENT)
Dept: RADIOLOGY | Facility: HOSPITAL | Age: 74
Discharge: HOME | End: 2024-03-15
Attending: STUDENT IN AN ORGANIZED HEALTH CARE EDUCATION/TRAINING PROGRAM
Payer: MEDICARE

## 2024-03-15 DIAGNOSIS — S46.212A RUPTURE OF LEFT BICEPS TENDON, INITIAL ENCOUNTER: ICD-10-CM

## 2024-03-15 DIAGNOSIS — M25.512 LEFT SHOULDER PAIN, UNSPECIFIED CHRONICITY: ICD-10-CM

## 2024-03-15 DIAGNOSIS — M25.522 LEFT ELBOW PAIN: ICD-10-CM

## 2024-03-15 PROCEDURE — 73218 MRI UPPER EXTREMITY W/O DYE: CPT | Mod: 26,LT,, | Performed by: RADIOLOGY

## 2024-03-15 PROCEDURE — 73221 MRI JOINT UPR EXTREM W/O DYE: CPT | Mod: 26,LT,, | Performed by: RADIOLOGY

## 2024-03-15 PROCEDURE — 73218 MRI UPPER EXTREMITY W/O DYE: CPT | Mod: TC,LT

## 2024-03-15 PROCEDURE — 73221 MRI JOINT UPR EXTREM W/O DYE: CPT | Mod: TC,LT

## 2024-03-15 RX ORDER — GADOBUTROL 604.72 MG/ML
10 INJECTION INTRAVENOUS
Status: DISCONTINUED | OUTPATIENT
Start: 2024-03-15 | End: 2024-03-15

## 2024-05-17 NOTE — PROGRESS NOTES
MST triggered for weight loss unsure/poor appetite PTA. Chart reviewed. No nutrition intervention indicated at this time. RD available via consult. Will follow per policy.     This note was completed with dictation software and grammatical errors may exist.    CC:Knee pain, shoulder pain    HPI: The patient is a 67-year-old man with history of CAD and stent, hypertension, degenerative joint disease in the right shoulder and knee who presents in referral from Dr. Traore for help with pain.  He returns in follow-up today with multiple pain complaints.  He continues to have right shoulder, low back and right knee pain. He recently underwent left knee arthroscopy on 11/30 with Dr. Galvin.  He continues to recover from this but feels some benefit so far.  He plans on having knee replacements and about 2 years after he retires.  Otherwise, he continues to do well with his Butrans patch, feels that this controls his pain well.    Pain intervention history:  He is status post right L3 and L4 transforaminal epidural steroid injections on 3/14/14 with 85% relief at first, now reporting what sounds like about 25% relief.  He is status post right L3 and L4 transforaminal epidural steroid injection on 6/4/14 with 0% relief.  He is status post right knee geniculate nerve block on 11/4/16 with 0% relief so radiofrequency ablation was not scheduled.    Urine drug screen on 1/22/14 was positive for buprenorphine and tramadol but negative for hydrocodone which he was apparently taking.    Repeat drug screen on 2/20/14 again positive for buprenorphine and tramadol but negative for hydrocodone.  5/20/14 and 6/25/14 urine drug screens both consistent.    ROS:He reports fatigability, chest pain at times, easy bruising, joint stiffness, joint swelling and back pain.  Balance of review of systems negative.    Medical, surgical, family and social history reviewed elsewhere in record.    Medications/Allergies: See med card    Vitals:    01/07/19 1007   BP: 120/73   Pulse: 74   Resp: 18   Temp: 98.3 °F (36.8 °C)   TempSrc: Oral   Weight: 85.2 kg (187 lb 13.3 oz)   PainSc:   4   PainLoc: Back         Physical  exam:  Gen: A and O x3, pleasant, well-groomed  Skin: No rashes or obvious lesions  HEENT: PERRLA  CVS: Regular rate and rhythm, normal S1 and S2, no murmurs.  Resp: Clear to auscultation bilaterally, no wheezes or rales.  Abdomen: Soft, NT/ND, normal bowel sounds present.  Musculoskeletal:  No antalgic gait.      Neuro:  Upper extremities: 5/5 strength bilaterally   Lower extremities: 5/5 strength bilaterally  Reflexes: Brachioradialis 2+, Bicep 2+, Tricep 2+. Patellar 2+, Achilles 2+ bilaterally  Sensory: Intact and symmetrical to light touch and pinprick in C2-T1 dermatomes bilaterally.  Intact and symmetrical to light touch and pinprick in L2-S1 dermatomes bilaterally.    Lumbar spine:  Range of motion is mildly limited with extension and full with flexion with increased pain during each maneuver.  Oblique extension causes mild increased pain on the corresponding side.  Jamshid's test is negative bilaterally.  Straight leg raise is negative bilaterally.  Internal/external rotation of hip is negative bilaterally.  Myofascial exam: Mild tenderness to palpation to the bilateral lumbar paraspinous muscles.    Right knee: Mild crepitus on range of motion with pain on passive flexion and extension, mild tenderness to superior portion of the joint, lateral portion, no redness or erythema or swelling.    Left knee:  Deferred due to recent surgery.    Right shoulder: Range of motion is full but painful with flexion, abduction and internal/external rotation.  Empty can test positive.  Moderate generalized tenderness to palpation to the anterior and lateral shoulder.  Mild tenderness to palpation to the right scapular region.    Imagin11 Xray right knee:   THERE IS SPURRING OF THE SUPERIOR PATELLAR FACET AND NARROWING OF THE PATELLOFEMORAL JOINT SPACE. NO JOINT EFFUSION OR ACUTE OSSEOUS ABNORMALITY IS SEEN.    14 MRI lumbar spine  At the T12-L1 level, no significant disk bulge, central canal stenosis, or  neural foraminal stenosis is noted. Mild ligamentous hypertrophy is noted  At the L1-L2 level, no significant disk bulge, central canal stenosis, or neural foraminal stenosis is noted. Mild ligamentous hypertrophy is noted   At the L2-L3 level, minimal disk bulging may be noted one to 2 mm. Facet arthropathy and ligamentous hypertrophy is noted. Minimal bilateral neural foraminal narrowing is noted. The central canal is approximately 11 mm and may be minimally narrowed.  At the L3-L4 level, broad-based disk bulging appears to be present of approximately 3 to 4 mm with an asymmetric component greater towards the right neuroforamen a 4 mm that may relate to protrusion. Facet arthropathy and ligamentous hypertrophy is noted. Mild central canal stenosis is noted to 9 mm. Mild to moderate right neuroforaminal narrowing appears to present with possible contact of the exiting nerve root on the right. Mild left neural foraminal narrowing is noted.  At the L4-L5 level the disk osteophyte protrusion appears to be present paracentric to the right of approximately 5 mm. Mild to moderate central canal narrowing is noted to 8 mm. Mild to moderate right nerve foraminal narrowing is noted with mild left neuroforaminal narrowing. Contact of the exiting right nerve root may be present. Increased T2 signal is noted suggestive of an annular tear paracentric to the right  At the L5-S1 level, ligamentous hypertrophy is noted in. No significant disk bulge, central canal stenosis, or neural foraminal stenosis is noted.      Assessment:  The patient is a 67-year-old man with history of CAD and stent, hypertension, degenerative joint disease in the right shoulder and knee who presents in referral from Dr. Traore for help with pain.   1. Chronic pain of left knee     2. Chronic pain of right knee     3. Chronic right shoulder pain     4. DDD (degenerative disc disease), lumbar     5. Opioid contract exists         Plan:  1.  Dr. Giron  provided prescriptions for Butrans 20 micrograms/hour.  I have reviewed the [place] Board of Pharmacy website and there are no abberancies.    2.  Follow-up in 3 months or sooner as needed.    Greater than 50% of this 15 min visit was spent counseling the patient.

## 2024-05-29 DIAGNOSIS — F32.5 DEPRESSION, MAJOR, IN REMISSION: ICD-10-CM

## 2024-05-29 DIAGNOSIS — E78.5 HYPERLIPIDEMIA WITH TARGET LDL LESS THAN 100: ICD-10-CM

## 2024-05-29 DIAGNOSIS — I25.118 CORONARY ARTERY DISEASE OF NATIVE ARTERY OF NATIVE HEART WITH STABLE ANGINA PECTORIS: ICD-10-CM

## 2024-05-29 RX ORDER — ATORVASTATIN CALCIUM 40 MG/1
TABLET, FILM COATED ORAL
Refills: 0 | OUTPATIENT
Start: 2024-05-29

## 2024-05-29 NOTE — TELEPHONE ENCOUNTER
Refill Decision Note   Luigi Murillo  is requesting a refill authorization.  Brief Assessment and Rationale for Refill:  Quick Discontinue     Medication Therapy Plan:    Pharmacy is requesting new scripts for the following medications without required information, (sig/ frequency/qty/etc)      Medication Reconciliation Completed: No     Comments: Pharmacies have been requesting medications for patients without required information, (sig, frequency, qty, etc.). In addition, requests are sent for medication(s) pt. are currently not taking, and medications patients have never taken.    We have spoken to the pharmacies about these request types and advised their teams previously that we are unable to assess these New Script requests and require all details for these requests. This is a known issue and has been reported.     Note composed:1:20 PM 05/29/2024

## 2024-05-29 NOTE — TELEPHONE ENCOUNTER
Care Due:                  Date            Visit Type   Department     Provider  --------------------------------------------------------------------------------                                EP -                              PRIMARY      [facility] FAMILY  Last Visit: 08-      CARE ([facility])   MEDICINE       Iveth Stafford  Next Visit: None Scheduled  None         None Found                                                            Last  Test          Frequency    Reason                     Performed    Due Date  --------------------------------------------------------------------------------    Office Visit  12 months..  diclofenac...............  08- 08-    [facility] Embedded Care Due Messages. Reference number: [id number].   5/29/2024 1:10:15 PM CDT

## 2024-05-30 RX ORDER — BUPROPION HYDROCHLORIDE 150 MG/1
150 TABLET ORAL
Qty: 90 TABLET | Refills: 0 | Status: SHIPPED | OUTPATIENT
Start: 2024-05-30

## 2024-05-30 RX ORDER — SERTRALINE HYDROCHLORIDE 50 MG/1
50 TABLET, FILM COATED ORAL
Qty: 90 TABLET | Refills: 0 | Status: SHIPPED | OUTPATIENT
Start: 2024-05-30

## 2024-05-30 NOTE — TELEPHONE ENCOUNTER
Provider Staff:  Action required for this patient    Requires appointment      Please see care gap opportunities below in Care Due Message.    Thanks!  [facility]     Appointments      Date Provider   Last Visit   8/31/2023 Iveth Stafford MD   Next Visit   5/29/2024 Iveth Stafford MD     Refill Decision Note   Luigi Murillo  is requesting a refill authorization.  Brief Assessment and Rationale for Refill:  Approve     Medication Therapy Plan:         Comments:     Note composed:8:18 AM 05/30/2024

## 2024-09-23 DIAGNOSIS — E78.5 HYPERLIPIDEMIA WITH TARGET LDL LESS THAN 100: ICD-10-CM

## 2024-09-23 DIAGNOSIS — I25.118 CORONARY ARTERY DISEASE OF NATIVE ARTERY OF NATIVE HEART WITH STABLE ANGINA PECTORIS: ICD-10-CM

## 2024-09-24 RX ORDER — ATORVASTATIN CALCIUM 40 MG/1
40 TABLET, FILM COATED ORAL DAILY
Qty: 90 TABLET | Refills: 3 | Status: SHIPPED | OUTPATIENT
Start: 2024-09-24

## 2024-09-24 NOTE — TELEPHONE ENCOUNTER
Refill Routing Note   Medication(s) are not appropriate for processing by [facility] for the following reason(s):        Non-participating provider    [facility] action(s):  Route               Appointments  past 12m or future 3m with PCP    Date Provider   Last Visit   Visit date not found Kenzie Hwang PA-C   Next Visit   Visit date not found Kenzie Hwang PA-C   ED visits in past 90 days: 0        Note composed:11:14 AM 09/24/2024

## 2024-10-01 ENCOUNTER — PATIENT MESSAGE (OUTPATIENT)
Dept: FAMILY MEDICINE | Facility: CLINIC | Age: 74
End: 2024-10-01
Payer: MEDICARE

## 2024-10-01 DIAGNOSIS — F32.5 DEPRESSION, MAJOR, IN REMISSION: Primary | ICD-10-CM

## 2024-10-01 RX ORDER — BUPROPION HYDROCHLORIDE 150 MG/1
150 TABLET ORAL DAILY
Qty: 30 TABLET | Refills: 0 | Status: SHIPPED | OUTPATIENT
Start: 2024-10-01 | End: 2024-10-31

## 2024-10-01 NOTE — TELEPHONE ENCOUNTER
I have signed for the following orders AND/OR meds.  Please call the patient and ask the patient to schedule the testing AND/OR inform about any medications that were sent. Medications have been sent to pharmacy listed below      No orders of the defined types were placed in this encounter.      Medications Ordered This Encounter   Medications    buPROPion (WELLBUTRIN XL) 150 MG TB24 tablet     Sig: Take 1 tablet (150 mg total) by mouth once daily.     Dispense:  30 tablet     Refill:  0         [facility]  for [facility] - [place] [address]  Phone: [phone] Fax: [phone]    [facility] - [place] [address]  Phone: [phone] Fax: [phone]    [facility] - [place], [place] - [address] [address] [address]  [address] [facility] [address]  Phone: [phone] Fax: [phone]

## 2024-10-15 DIAGNOSIS — F32.5 DEPRESSION, MAJOR, IN REMISSION: ICD-10-CM

## 2024-10-15 DIAGNOSIS — I25.10 CAD IN NATIVE ARTERY: ICD-10-CM

## 2024-10-16 RX ORDER — METOPROLOL SUCCINATE 25 MG/1
TABLET, EXTENDED RELEASE ORAL
Qty: 45 TABLET | Refills: 0 | Status: SHIPPED | OUTPATIENT
Start: 2024-10-16

## 2024-10-16 RX ORDER — BUPROPION HYDROCHLORIDE 150 MG/1
150 TABLET ORAL DAILY
Qty: 90 TABLET | Refills: 3 | Status: SHIPPED | OUTPATIENT
Start: 2024-10-16

## 2024-10-16 RX ORDER — SERTRALINE HYDROCHLORIDE 50 MG/1
50 TABLET, FILM COATED ORAL
Qty: 90 TABLET | Refills: 0 | Status: SHIPPED | OUTPATIENT
Start: 2024-10-16

## 2024-10-16 NOTE — TELEPHONE ENCOUNTER
Care Due:                  Date            Visit Type   Department     Provider  --------------------------------------------------------------------------------                                EP -                              PRIMARY      [facility] FAMILY  Last Visit: 08-      CARE ([facility])   MEDICINE       Iveth Stafford  Next Visit: None Scheduled  None         None Found                                                            Last  Test          Frequency    Reason                     Performed    Due Date  --------------------------------------------------------------------------------    Office Visit  12 months..  diclofenac, sertraline...  08- 08-    [facility] Embedded Care Due Messages. Reference number: [id number].   10/15/2024 10:02:50 PM CDT

## 2024-10-16 NOTE — TELEPHONE ENCOUNTER
Provider Staff:  Action required for this patient    Requires appointment      Please see care gap opportunities below in Care Due Message.    Thanks!  [facility]     Appointments      Date Provider   Last Visit   8/31/2023 Iveth Stafford MD   Next Visit   Visit date not found Iveth Stafford MD     Refill Decision Note   Luigi Murillo  is requesting a refill authorization.  Brief Assessment and Rationale for Refill:  Approve     Medication Therapy Plan:         Comments:     Note composed:12:26 AM 10/16/2024

## 2024-10-16 NOTE — TELEPHONE ENCOUNTER
Refill Routing Note   Medication(s) are not appropriate for processing by [facility] for the following reason(s):        Non-participating provider  Responsible provider unclear    [facility] action(s):  Route             Appointments  past 12m or future 3m with PCP    Date Provider   Last Visit   3/4/2024 Kenzie Hwang PA-C   Next Visit   Visit date not found Kenzie Hwang PA-C   ED visits in past 90 days: 0        Note composed:7:47 AM 10/16/2024

## 2024-10-17 ENCOUNTER — LAB VISIT (OUTPATIENT)
Dept: LAB | Facility: HOSPITAL | Age: 74
End: 2024-10-17
Attending: INTERNAL MEDICINE
Payer: MEDICARE

## 2024-10-17 ENCOUNTER — OFFICE VISIT (OUTPATIENT)
Dept: FAMILY MEDICINE | Facility: CLINIC | Age: 74
End: 2024-10-17
Payer: MEDICARE

## 2024-10-17 VITALS
DIASTOLIC BLOOD PRESSURE: 78 MMHG | HEIGHT: 71 IN | HEART RATE: 75 BPM | TEMPERATURE: 98 F | WEIGHT: 204 LBS | SYSTOLIC BLOOD PRESSURE: 160 MMHG | BODY MASS INDEX: 28.56 KG/M2

## 2024-10-17 DIAGNOSIS — N40.1 BENIGN PROSTATIC HYPERPLASIA (BPH) WITH STRAINING ON URINATION: ICD-10-CM

## 2024-10-17 DIAGNOSIS — E55.9 VITAMIN D DEFICIENCY: ICD-10-CM

## 2024-10-17 DIAGNOSIS — N18.31 STAGE 3A CHRONIC KIDNEY DISEASE: ICD-10-CM

## 2024-10-17 DIAGNOSIS — R10.12 LUQ ABDOMINAL PAIN: ICD-10-CM

## 2024-10-17 DIAGNOSIS — I10 PRIMARY HYPERTENSION: Primary | ICD-10-CM

## 2024-10-17 DIAGNOSIS — N25.81 SECONDARY HYPERPARATHYROIDISM: ICD-10-CM

## 2024-10-17 DIAGNOSIS — Z79.899 ENCOUNTER FOR LONG-TERM (CURRENT) USE OF HIGH-RISK MEDICATION: ICD-10-CM

## 2024-10-17 DIAGNOSIS — Z12.5 SCREENING PSA (PROSTATE SPECIFIC ANTIGEN): ICD-10-CM

## 2024-10-17 DIAGNOSIS — Z12.11 COLON CANCER SCREENING: ICD-10-CM

## 2024-10-17 DIAGNOSIS — E78.5 HYPERLIPIDEMIA WITH TARGET LDL LESS THAN 100: ICD-10-CM

## 2024-10-17 DIAGNOSIS — I10 PRIMARY HYPERTENSION: ICD-10-CM

## 2024-10-17 DIAGNOSIS — I25.118 CORONARY ARTERY DISEASE OF NATIVE ARTERY OF NATIVE HEART WITH STABLE ANGINA PECTORIS: ICD-10-CM

## 2024-10-17 DIAGNOSIS — I25.10 CAD IN NATIVE ARTERY: ICD-10-CM

## 2024-10-17 DIAGNOSIS — R39.16 BENIGN PROSTATIC HYPERPLASIA (BPH) WITH STRAINING ON URINATION: ICD-10-CM

## 2024-10-17 LAB
ALBUMIN SERPL BCP-MCNC: 3.9 G/DL (ref 3.5–5.2)
ALBUMIN SERPL BCP-MCNC: 3.9 G/DL (ref 3.5–5.2)
ALP SERPL-CCNC: 97 U/L (ref 40–150)
ALT SERPL W/O P-5'-P-CCNC: 27 U/L (ref 10–44)
ANION GAP SERPL CALC-SCNC: 8 MMOL/L (ref 8–16)
ANION GAP SERPL CALC-SCNC: 8 MMOL/L (ref 8–16)
AST SERPL-CCNC: 27 U/L (ref 10–40)
BASOPHILS # BLD AUTO: 0.03 K/UL (ref 0–0.2)
BASOPHILS NFR BLD: 0.5 % (ref 0–1.9)
BILIRUB SERPL-MCNC: 0.6 MG/DL (ref 0.1–1)
BUN SERPL-MCNC: 20 MG/DL (ref 8–23)
BUN SERPL-MCNC: 20 MG/DL (ref 8–23)
CALCIUM SERPL-MCNC: 8.9 MG/DL (ref 8.7–10.5)
CALCIUM SERPL-MCNC: 8.9 MG/DL (ref 8.7–10.5)
CHLORIDE SERPL-SCNC: 105 MMOL/L (ref 95–110)
CHLORIDE SERPL-SCNC: 105 MMOL/L (ref 95–110)
CHOLEST SERPL-MCNC: 184 MG/DL (ref 120–199)
CHOLEST/HDLC SERPL: 3.1 {RATIO} (ref 2–5)
CO2 SERPL-SCNC: 25 MMOL/L (ref 23–29)
CO2 SERPL-SCNC: 25 MMOL/L (ref 23–29)
CREAT SERPL-MCNC: 1.1 MG/DL (ref 0.5–1.4)
CREAT SERPL-MCNC: 1.1 MG/DL (ref 0.5–1.4)
CREAT UR-MCNC: 163 MG/DL (ref 23–375)
DIFFERENTIAL METHOD BLD: ABNORMAL
EOSINOPHIL # BLD AUTO: 0.2 K/UL (ref 0–0.5)
EOSINOPHIL NFR BLD: 2.9 % (ref 0–8)
ERYTHROCYTE [DISTWIDTH] IN BLOOD BY AUTOMATED COUNT: 13.2 % (ref 11.5–14.5)
EST. GFR  (NO RACE VARIABLE): >60 ML/MIN/1.73 M^2
EST. GFR  (NO RACE VARIABLE): >60 ML/MIN/1.73 M^2
GLUCOSE SERPL-MCNC: 96 MG/DL (ref 70–110)
GLUCOSE SERPL-MCNC: 96 MG/DL (ref 70–110)
HCT VFR BLD AUTO: 40.4 % (ref 40–54)
HDLC SERPL-MCNC: 59 MG/DL (ref 40–75)
HDLC SERPL: 32.1 % (ref 20–50)
HGB BLD-MCNC: 13.1 G/DL (ref 14–18)
IMM GRANULOCYTES # BLD AUTO: 0.02 K/UL (ref 0–0.04)
IMM GRANULOCYTES NFR BLD AUTO: 0.3 % (ref 0–0.5)
LDLC SERPL CALC-MCNC: 106 MG/DL (ref 63–159)
LYMPHOCYTES # BLD AUTO: 1.7 K/UL (ref 1–4.8)
LYMPHOCYTES NFR BLD: 28.5 % (ref 18–48)
MCH RBC QN AUTO: 29.8 PG (ref 27–31)
MCHC RBC AUTO-ENTMCNC: 32.4 G/DL (ref 32–36)
MCV RBC AUTO: 92 FL (ref 82–98)
MONOCYTES # BLD AUTO: 0.5 K/UL (ref 0.3–1)
MONOCYTES NFR BLD: 7.8 % (ref 4–15)
NEUTROPHILS # BLD AUTO: 3.5 K/UL (ref 1.8–7.7)
NEUTROPHILS NFR BLD: 60 % (ref 38–73)
NONHDLC SERPL-MCNC: 125 MG/DL
NRBC BLD-RTO: 0 /100 WBC
PHOSPHATE SERPL-MCNC: 2.8 MG/DL (ref 2.7–4.5)
PLATELET # BLD AUTO: 140 K/UL (ref 150–450)
PMV BLD AUTO: 10 FL (ref 9.2–12.9)
POTASSIUM SERPL-SCNC: 4.4 MMOL/L (ref 3.5–5.1)
POTASSIUM SERPL-SCNC: 4.4 MMOL/L (ref 3.5–5.1)
PROT SERPL-MCNC: 6.9 G/DL (ref 6–8.4)
PROT UR-MCNC: 35 MG/DL (ref 0–15)
PROT/CREAT UR: 0.21 MG/G{CREAT} (ref 0–0.2)
RBC # BLD AUTO: 4.4 M/UL (ref 4.6–6.2)
SODIUM SERPL-SCNC: 138 MMOL/L (ref 136–145)
SODIUM SERPL-SCNC: 138 MMOL/L (ref 136–145)
TRIGL SERPL-MCNC: 95 MG/DL (ref 30–150)
WBC # BLD AUTO: 5.9 K/UL (ref 3.9–12.7)

## 2024-10-17 PROCEDURE — 80053 COMPREHEN METABOLIC PANEL: CPT | Performed by: INTERNAL MEDICINE

## 2024-10-17 PROCEDURE — 36415 COLL VENOUS BLD VENIPUNCTURE: CPT | Mod: PO | Performed by: INTERNAL MEDICINE

## 2024-10-17 PROCEDURE — 85025 COMPLETE CBC W/AUTO DIFF WBC: CPT | Performed by: INTERNAL MEDICINE

## 2024-10-17 PROCEDURE — 82570 ASSAY OF URINE CREATININE: CPT | Performed by: INTERNAL MEDICINE

## 2024-10-17 PROCEDURE — 84156 ASSAY OF PROTEIN URINE: CPT | Performed by: INTERNAL MEDICINE

## 2024-10-17 PROCEDURE — 99215 OFFICE O/P EST HI 40 MIN: CPT | Mod: PBBFAC,PO | Performed by: INTERNAL MEDICINE

## 2024-10-17 PROCEDURE — 82306 VITAMIN D 25 HYDROXY: CPT | Performed by: INTERNAL MEDICINE

## 2024-10-17 PROCEDURE — G2211 COMPLEX E/M VISIT ADD ON: HCPCS | Mod: S$PBB,,, | Performed by: INTERNAL MEDICINE

## 2024-10-17 PROCEDURE — 84153 ASSAY OF PSA TOTAL: CPT | Performed by: INTERNAL MEDICINE

## 2024-10-17 PROCEDURE — 84443 ASSAY THYROID STIM HORMONE: CPT | Performed by: INTERNAL MEDICINE

## 2024-10-17 PROCEDURE — 83970 ASSAY OF PARATHORMONE: CPT | Performed by: INTERNAL MEDICINE

## 2024-10-17 PROCEDURE — 99214 OFFICE O/P EST MOD 30 MIN: CPT | Mod: S$PBB,,, | Performed by: INTERNAL MEDICINE

## 2024-10-17 PROCEDURE — 84100 ASSAY OF PHOSPHORUS: CPT | Performed by: INTERNAL MEDICINE

## 2024-10-17 PROCEDURE — 80061 LIPID PANEL: CPT | Performed by: INTERNAL MEDICINE

## 2024-10-17 PROCEDURE — 99999 PR PBB SHADOW E&M-EST. PATIENT-LVL V: CPT | Mod: PBBFAC,,, | Performed by: INTERNAL MEDICINE

## 2024-10-18 LAB
25(OH)D3+25(OH)D2 SERPL-MCNC: 30 NG/ML (ref 30–96)
COMPLEXED PSA SERPL-MCNC: 0.35 NG/ML (ref 0–4)
PTH-INTACT SERPL-MCNC: 135.1 PG/ML (ref 9–77)
TSH SERPL DL<=0.005 MIU/L-ACNC: 1.57 UIU/ML (ref 0.4–4)

## 2024-10-21 ENCOUNTER — HOSPITAL ENCOUNTER (OUTPATIENT)
Dept: RADIOLOGY | Facility: HOSPITAL | Age: 74
Discharge: HOME | End: 2024-10-21
Attending: INTERNAL MEDICINE
Payer: MEDICARE

## 2024-10-21 DIAGNOSIS — R10.12 LUQ ABDOMINAL PAIN: ICD-10-CM

## 2024-10-21 PROCEDURE — 76700 US EXAM ABDOM COMPLETE: CPT | Mod: TC,PO

## 2024-10-21 PROCEDURE — 76700 US EXAM ABDOM COMPLETE: CPT | Mod: 26,,, | Performed by: RADIOLOGY

## 2024-10-27 RX ORDER — METOPROLOL SUCCINATE 50 MG/1
50 TABLET, EXTENDED RELEASE ORAL DAILY
Qty: 90 TABLET | Refills: 3 | Status: SHIPPED | OUTPATIENT
Start: 2024-10-27

## 2024-10-27 RX ORDER — PANTOPRAZOLE SODIUM 40 MG/1
40 TABLET, DELAYED RELEASE ORAL DAILY
Qty: 30 TABLET | Refills: 0 | Status: SHIPPED | OUTPATIENT
Start: 2024-10-27 | End: 2025-10-27

## 2024-10-27 RX ORDER — TAMSULOSIN HYDROCHLORIDE 0.4 MG/1
2 CAPSULE ORAL NIGHTLY
Qty: 180 CAPSULE | Refills: 3 | Status: SHIPPED | OUTPATIENT
Start: 2024-10-27

## 2024-10-29 DIAGNOSIS — Z00.00 ENCOUNTER FOR MEDICARE ANNUAL WELLNESS EXAM: ICD-10-CM

## 2024-10-31 ENCOUNTER — CLINICAL SUPPORT (OUTPATIENT)
Dept: FAMILY MEDICINE | Facility: CLINIC | Age: 74
End: 2024-10-31
Payer: MEDICARE

## 2024-10-31 ENCOUNTER — TELEPHONE (OUTPATIENT)
Dept: FAMILY MEDICINE | Facility: CLINIC | Age: 74
End: 2024-10-31

## 2024-10-31 ENCOUNTER — PATIENT MESSAGE (OUTPATIENT)
Dept: CARDIOLOGY | Facility: CLINIC | Age: 74
End: 2024-10-31
Payer: MEDICARE

## 2024-10-31 VITALS — DIASTOLIC BLOOD PRESSURE: 73 MMHG | HEART RATE: 55 BPM | SYSTOLIC BLOOD PRESSURE: 127 MMHG

## 2024-10-31 DIAGNOSIS — Z01.30 BP CHECK: Primary | ICD-10-CM

## 2024-10-31 PROCEDURE — 99999 PR PBB SHADOW E&M-EST. PATIENT-LVL II: CPT | Mod: PBBFAC,,,

## 2024-10-31 PROCEDURE — 99212 OFFICE O/P EST SF 10 MIN: CPT | Mod: PBBFAC,PO

## 2024-11-05 ENCOUNTER — TELEPHONE (OUTPATIENT)
Dept: CARDIOLOGY | Facility: CLINIC | Age: 74
End: 2024-11-05

## 2024-11-05 ENCOUNTER — OFFICE VISIT (OUTPATIENT)
Dept: CARDIOLOGY | Facility: CLINIC | Age: 74
End: 2024-11-05
Payer: MEDICARE

## 2024-11-05 ENCOUNTER — PATIENT MESSAGE (OUTPATIENT)
Dept: CARDIOLOGY | Facility: CLINIC | Age: 74
End: 2024-11-05

## 2024-11-05 ENCOUNTER — TELEPHONE (OUTPATIENT)
Dept: CARDIOLOGY | Facility: CLINIC | Age: 74
End: 2024-11-05
Payer: MEDICARE

## 2024-11-05 VITALS
DIASTOLIC BLOOD PRESSURE: 83 MMHG | HEIGHT: 71 IN | BODY MASS INDEX: 28.43 KG/M2 | WEIGHT: 203.06 LBS | HEART RATE: 64 BPM | SYSTOLIC BLOOD PRESSURE: 160 MMHG

## 2024-11-05 DIAGNOSIS — E78.5 HYPERLIPIDEMIA WITH TARGET LDL LESS THAN 100: ICD-10-CM

## 2024-11-05 DIAGNOSIS — Z01.810 PREOP CARDIOVASCULAR EXAM: Primary | ICD-10-CM

## 2024-11-05 DIAGNOSIS — N18.31 STAGE 3A CHRONIC KIDNEY DISEASE: ICD-10-CM

## 2024-11-05 DIAGNOSIS — Z95.5 STATUS POST CORONARY ARTERY STENT PLACEMENT: ICD-10-CM

## 2024-11-05 DIAGNOSIS — I10 PRIMARY HYPERTENSION: ICD-10-CM

## 2024-11-05 DIAGNOSIS — I25.118 CORONARY ARTERY DISEASE OF NATIVE ARTERY OF NATIVE HEART WITH STABLE ANGINA PECTORIS: ICD-10-CM

## 2024-11-05 LAB
OHS QRS DURATION: 70 MS
OHS QTC CALCULATION: 410 MS

## 2024-11-05 PROCEDURE — 99213 OFFICE O/P EST LOW 20 MIN: CPT | Mod: PBBFAC,25,PO

## 2024-11-05 PROCEDURE — 99999 PR PBB SHADOW E&M-EST. PATIENT-LVL III: CPT | Mod: PBBFAC,,,

## 2024-11-05 PROCEDURE — 93010 ELECTROCARDIOGRAM REPORT: CPT | Mod: ,,, | Performed by: INTERNAL MEDICINE

## 2024-11-05 PROCEDURE — 93005 ELECTROCARDIOGRAM TRACING: CPT | Mod: PO

## 2024-11-05 NOTE — PROGRESS NOTES
[facility]  Date: 11/5/24    Patient: Luigi Murillo, 1950, [id number]  Primary Care Provider: Iveth Stafford MD     Chief Complaint: Cardiac clearance     Subjective:       Luigi Murillo is a 73 y.o. male who presents for cardiac clearance. They follow with Dr. Barros.    CBC, CMP, lipid panel stable. At last office visit, patient with low-normal BP and excessive bruising for which lisinopril and ranolazine stopped and ASA decreased to once weekly.     Since then, patient without any active cardiac complaints. Notes some shortness of breath with heavy activity, but nothing out of the normal. Also with tolerable BLE edema. Reports metoprolol dosage being increased by PCP for high BP reading in clinic. Patient states new dosage dropped SBP to 60s- now back on original dosing, however, unsure if it is 12.5 mg or 25 mg qd. Unsure of average SBP at home. Denies chest discomfort, palpitations, dizziness, syncope, orthopnea, PND.    Focused Past History includes:  Coronary artery disease s/p remote stent placement, PTCA 4/2023  Stress echo 10/2020: no ischemia, LVEF 60%, PASP 30  C 4/2023: patent mid LAD stent, 99% stenosis of mid ramus intermedius which was successfully angioplastied with 2.0x12 mm balloon, patent stent in prox PDA, 20-30% stenosis in prox 2nd OM, 80-90% tandem stenosis of prox-mid nondominant RCA  Hypertension  Hyperlipidemia   Chronic kidney disease     Current Outpatient Medications   Medication Sig    aspirin (ECOTRIN) 81 MG EC tablet Take 81 mg by mouth once daily.    atorvastatin (LIPITOR) 40 MG tablet Take 1 tablet (40 mg total) by mouth once daily.    buPROPion (WELLBUTRIN XL) 150 MG TB24 tablet Take 1 tablet (150 mg total) by mouth once daily.    metoprolol succinate (TOPROL-XL) 50 MG 24 hr tablet Take 1 tablet (50 mg total) by mouth once daily.    morphine (MS CONTIN) 30 MG 12 hr tablet Take 30 mg by mouth 3 (three) times daily.     oxyCODONE-acetaminophen (PERCOCET)  mg per tablet     sertraline (ZOLOFT) 50 MG tablet TAKE 1 TABLET DAILY    tamsulosin (FLOMAX) 0.4 mg Cap Take 2 capsules (0.8 mg total) by mouth every evening.    diclofenac sodium (VOLTAREN) 1 % Gel Apply 2 g topically 2 (two) times daily as needed (joint pain). (Patient not taking: Reported on 11/5/2024)    pantoprazole (PROTONIX) 40 MG tablet Take 1 tablet (40 mg total) by mouth once daily. (Patient not taking: Reported on 11/5/2024)     No current facility-administered medications for this visit.            Objective       Review of Systems  Constitutional: negative for fevers, night sweats, and weight loss  Eyes: negative for visual disturbance, diplopia  Respiratory: negative for cough, hemoptysis, sputum, and wheezing  Cardiovascular: see HPI  Gastrointestinal: negative for abdominal pain, bright red blood per rectum, change in bowel habits, dysphagia, melena, and reflux symptoms  Genitourinary:negative for dysuria, frequency, and hematuria  Hematologic/lymphatic: negative for bleeding, easy bruising, and lymphadenopathy  Musculoskeletal:negative for arthralgias, back pain, and myalgias  Neurological: negative for gait problems, paresthesia, speech problems, vertigo, and weakness  Behavioral/Psych: negative for excessive alcohol consumption, illegal drug usage, and sleep disturbance    -------------------------------------    BPH (benign prostatic hypertrophy) with urinary obstruction    CAD (coronary artery disease)    x2    Chronic pain    DDD (degenerative disc disease), lumbar    Depression, major, in remission    Hyperlipidemia LDL goal < 100    Hypertension    MRSA cellulitis    Osteoarthritis    Trigger finger, right ring finger     ----------------------------    Angiogram, coronary, with left heart catheterization    Procedure: Angiogram, Coronary, with Left Heart Cath;  Surgeon: Parth Barros MD;  Location: [facility];  Service: Cardiology;   "Laterality: N/A;    Colonoscopy    Procedure: COLONOSCOPY;  Surgeon: Diann Negrete MD;  Location: [facility] ENDO;  Service: Endoscopy;  Laterality: N/A;    Coronary angioplasty    2 Stents placed a few year ago    Epidural steroid injection    Pain management    Injury    Joint replacement    Knee surgery    Right, x2    Laryngoscopy    Procedure: LARYNGOSCOPY;  Surgeon: Wandy Dan MD;  Location: [facility] OR;  Service: ENT;  Laterality: Bilateral;    Mandible fracture surgery    accident during  service    Percutaneous coronary intervention, artery    Procedure: Percutaneous coronary intervention;  Surgeon: Parth Barros MD;  Location: [facility] CATH;  Service: Cardiology;  Laterality: N/A;    Repair of ligament of shoulder    Rotator cuff repair    right times 2    Vasectomy    Vocal fold lesion excision    Procedure: EXCISION, LESION, VOCAL CORD;  Surgeon: Wandy Dan MD;  Location: [facility] OR;  Service: ENT;  Laterality: Bilateral;        Family History   Problem Relation Name Age of Onset    Heart disease Father Tyshawn Murillo     Vision loss Father Tyshawn Murillo     Emphysema Mother clemente Findling     Cancer Mother clemente Findsuzi         unknown    Heart failure Mother clemente Findsuzi     Arthritis Mother clemente Findling     COPD Mother clemente Findling     Heart disease Mother clemente Raymundo     Prostate cancer Brother      Diabetes Paternal Aunt Michelle Murillo      Social History     Tobacco Use    Smoking status: Former     Current packs/day: 0.00     Average packs/day: 1 pack/day for 25.0 years (25.0 ttl pk-yrs)     Types: Cigarettes     Start date: 1982     Quit date: 2007     Years since quittin.5    Smokeless tobacco: Never   Substance Use Topics    Alcohol use: No    Drug use: Never       Physical Exam  BP (!) 160/83 (BP Location: Left arm, Patient Position: Sitting)   Pulse 64   Ht 5' 11" (1.803 m)   Wt 92.1 kg (203 lb 0.7 oz)   BMI 28.32 kg/m²   Body surface area is 2.15 meters " squared.  Body mass index is 28.32 kg/m².    General appearance: alert, appears stated age, cooperative, and no distress  Head: Normocephalic, without obvious abnormality, atraumatic  Neck: no carotid bruit, no JVD, and supple, symmetrical, trachea midline  Lungs: clear to auscultation bilaterally  Heart: regular rate and rhythm; S1, S2 normal, no murmur, click, rub or gallop  Abdomen: soft, non-tender, no distended  Extremities: extremities atraumatic, 1+ pitting edema bilaterally   Skin: warm, no cyanosis, no pathologic ecchymosis in exposed portions  Neurologic: Grossly normal. A&O x3      Lab Review   Lab Results   Component Value Date    WBC 5.90 10/17/2024    HGB 13.1 (L) 10/17/2024    HCT 40.4 10/17/2024    MCV 92 10/17/2024     (L) 10/17/2024         BMP  Lab Results   Component Value Date     10/17/2024     10/17/2024    K 4.4 10/17/2024    K 4.4 10/17/2024     10/17/2024     10/17/2024    CO2 25 10/17/2024    CO2 25 10/17/2024    BUN 20 10/17/2024    BUN 20 10/17/2024    CREATININE 1.1 10/17/2024    CREATININE 1.1 10/17/2024    CALCIUM 8.9 10/17/2024    CALCIUM 8.9 10/17/2024    ANIONGAP 8 10/17/2024    ANIONGAP 8 10/17/2024    ESTGFRAFRICA >60.0 02/26/2021    EGFRNONAA >60.0 02/26/2021       Lab Results   Component Value Date    LABPROT 10.5 06/10/2011    ALBUMIN 3.9 10/17/2024    ALBUMIN 3.9 10/17/2024       Lab Results   Component Value Date    ALT 27 10/17/2024    AST 27 10/17/2024    ALKPHOS 97 10/17/2024    BILITOT 0.6 10/17/2024       Lab Results   Component Value Date    TSH 1.572 10/17/2024       Lab Results   Component Value Date    CHOL 184 10/17/2024    CHOL 221 (H) 10/26/2023    CHOL 149 08/09/2022     Lab Results   Component Value Date    HDL 59 10/17/2024    HDL 53 10/26/2023    HDL 45 08/09/2022     Lab Results   Component Value Date    LDLCALC 106.0 10/17/2024    LDLCALC 145.6 10/26/2023    LDLCALC 74.2 08/09/2022     Lab Results   Component Value Date     TRIG 95 10/17/2024    TRIG 112 10/26/2023    TRIG 149 08/09/2022     Lab Results   Component Value Date    CHOLHDL 32.1 10/17/2024    CHOLHDL 24.0 10/26/2023    CHOLHDL 30.2 08/09/2022        EKG 11/5/24: NSR 71 bpm, , QRS 70, no ST changes         Assessment & Plan:     This is a 73 y.o. male with CAD s/p PCI, HTN, HLD, CKD. Reports stable ESCAMILLA and BLE edema for several months. Unsure of antihypertensive doses at home. Requests cardiac clearance for R finger surgery with Dr. White.     1. Preoperative cardiovascular exam  - EKG today, [facility] 4/2023 stable  - Patient with ESCAMILLA and BLE edema. Recommend further evaluation with TTE prior to undergoing surgery.   - Can hold ASA and clopidogrel for 5 days prior to procedure. Restart postoperatively as soon as patient able to tolerate.   - With the above recommendations and pending TTE results, the patient is optimized for the above mentioned procedure.     2. Coronary artery disease s/p PCI 4/2023  - Continue ASA 81 mg once weekly   - Continue atorvastatin 40 mg qd   - Continue clopidogrel 75 mg qd     3. Primary hypertension  - Uncontrolled  - Continue metoprolol succinate 12.5 ??? mg qd  - Reconcile home medications and message me with current dosages.  - Send BP log. If BP persistently > 140/90, can consider adding low dose lisinopril.    4. Hyperlipidemia   - , HDL 59  - Continue ASA 81 mg once weekly   - Continue atorvastatin 40 mg qd     5. Stage 3a chronic kidney disease  - Cr stable at 1.1  - Continue routine labs and management per primary care       Emphasized the importance of modifying lifestyle related risk factors including exercise, diet most resembling a Mediterranean diet.    Please follow up in 6 months or sooner if needed.      Carrie Wheeler PA-C  [facility]  Office: [phone]

## 2024-11-15 ENCOUNTER — HOSPITAL ENCOUNTER (OUTPATIENT)
Dept: CARDIOLOGY | Facility: HOSPITAL | Age: 74
Discharge: HOME | End: 2024-11-15
Payer: MEDICARE

## 2024-11-15 VITALS — WEIGHT: 203 LBS | BODY MASS INDEX: 28.42 KG/M2 | HEIGHT: 71 IN

## 2024-11-15 DIAGNOSIS — I10 PRIMARY HYPERTENSION: ICD-10-CM

## 2024-11-15 DIAGNOSIS — I25.118 CORONARY ARTERY DISEASE OF NATIVE ARTERY OF NATIVE HEART WITH STABLE ANGINA PECTORIS: ICD-10-CM

## 2024-11-15 DIAGNOSIS — Z95.5 STATUS POST CORONARY ARTERY STENT PLACEMENT: ICD-10-CM

## 2024-11-15 DIAGNOSIS — Z01.810 PREOP CARDIOVASCULAR EXAM: ICD-10-CM

## 2024-11-15 LAB
ASCENDING AORTA: 2.9 CM
AV INDEX (PROSTH): 0.69
AV MEAN GRADIENT: 3.4 MMHG
AV PEAK GRADIENT: 6.8 MMHG
AV VALVE AREA BY VELOCITY RATIO: 2.4 CM²
AV VALVE AREA: 2.4 CM²
AV VELOCITY RATIO: 0.69
BSA FOR ECHO PROCEDURE: 2.15 M2
CV ECHO LV RWT: 0.4 CM
DOP CALC AO PEAK VEL: 1.3 M/S
DOP CALC AO VTI: 30.4 CM
DOP CALC LVOT AREA: 3.5 CM2
DOP CALC LVOT DIAMETER: 2.1 CM
DOP CALC LVOT PEAK VEL: 0.9 M/S
DOP CALC LVOT STROKE VOLUME: 72.7 CM3
DOP CALCLVOT PEAK VEL VTI: 21 CM
E WAVE DECELERATION TIME: 172.04 MSEC
E/A RATIO: 1.32
E/E' RATIO: 12.24 M/S
ECHO LV POSTERIOR WALL: 0.9 CM (ref 0.6–1.1)
FRACTIONAL SHORTENING: 33.3 % (ref 28–44)
INTERVENTRICULAR SEPTUM: 1.1 CM (ref 0.6–1.1)
LEFT ATRIUM AREA SYSTOLIC (APICAL 2 CHAMBER): 17.64 CM2
LEFT ATRIUM AREA SYSTOLIC (APICAL 4 CHAMBER): 20.02 CM2
LEFT ATRIUM SIZE: 3.77 CM
LEFT ATRIUM VOLUME INDEX MOD: 28.8 ML/M2
LEFT ATRIUM VOLUME MOD: 61.08 ML
LEFT INTERNAL DIMENSION IN SYSTOLE: 3 CM (ref 2.1–4)
LEFT VENTRICLE DIASTOLIC VOLUME INDEX: 42.66 ML/M2
LEFT VENTRICLE DIASTOLIC VOLUME: 90.43 ML
LEFT VENTRICLE END SYSTOLIC VOLUME APICAL 2 CHAMBER: 55.16 ML
LEFT VENTRICLE END SYSTOLIC VOLUME APICAL 4 CHAMBER: 60.79 ML
LEFT VENTRICLE MASS INDEX: 72.3 G/M2
LEFT VENTRICLE SYSTOLIC VOLUME INDEX: 16.4 ML/M2
LEFT VENTRICLE SYSTOLIC VOLUME: 34.81 ML
LEFT VENTRICULAR INTERNAL DIMENSION IN DIASTOLE: 4.5 CM (ref 3.5–6)
LEFT VENTRICULAR MASS: 153.3 G
LV LATERAL E/E' RATIO: 10.4 M/S
LV SEPTAL E/E' RATIO: 14.86 M/S
LVED V (TEICH): 90.43 ML
LVES V (TEICH): 34.81 ML
LVOT MG: 1.83 MMHG
LVOT MV: 0.65 CM/S
MV PEAK A VEL: 0.79 M/S
MV PEAK E VEL: 1.04 M/S
MV STENOSIS PRESSURE HALF TIME: 49.89 MS
MV VALVE AREA P 1/2 METHOD: 4.41 CM2
PISA TR MAX VEL: 3.06 M/S
PULM VEIN S/D RATIO: 1.06
PV PEAK D VEL: 0.71 M/S
PV PEAK S VEL: 0.75 M/S
RIGHT VENTRICLE DIASTOLIC LENGTH: 7.2 CM
RIGHT VENTRICLE DIASTOLIC MID DIMENSION: 2.1 CM
RIGHT VENTRICULAR END-DIASTOLIC DIMENSION: 3.23 CM
RIGHT VENTRICULAR LENGTH IN DIASTOLE (APICAL 4-CHAMBER VIEW): 7.15 CM
RV MID DIAMA: 2.05 CM
RV TISSUE DOPPLER FREE WALL SYSTOLIC VELOCITY 1 (APICAL 4 CHAMBER VIEW): 14.16 CM/S
SINUS: 2.91 CM
STJ: 3.04 CM
TDI LATERAL: 0.1 M/S
TDI SEPTAL: 0.07 M/S
TDI: 0.09 M/S
TR MAX PG: 37 MMHG
TRICUSPID ANNULAR PLANE SYSTOLIC EXCURSION: 1.98 CM
Z-SCORE OF LEFT VENTRICULAR DIMENSION IN END DIASTOLE: -3.98
Z-SCORE OF LEFT VENTRICULAR DIMENSION IN END SYSTOLE: -2.45

## 2024-11-15 PROCEDURE — 93306 TTE W/DOPPLER COMPLETE: CPT | Mod: PO

## 2024-11-15 PROCEDURE — 93306 TTE W/DOPPLER COMPLETE: CPT | Mod: 26,,, | Performed by: INTERNAL MEDICINE

## 2024-11-18 LAB
ASCENDING AORTA: 2.9 CM
AV INDEX (PROSTH): 0.69
AV MEAN GRADIENT: 3.4 MMHG
AV PEAK GRADIENT: 6.8 MMHG
AV VALVE AREA BY VELOCITY RATIO: 2.4 CM²
AV VALVE AREA: 2.4 CM²
AV VELOCITY RATIO: 0.69
BSA FOR ECHO PROCEDURE: 2.15 M2
CV ECHO LV RWT: 0.4 CM
DOP CALC AO PEAK VEL: 1.3 M/S
DOP CALC AO VTI: 30.4 CM
DOP CALC LVOT AREA: 3.5 CM2
DOP CALC LVOT DIAMETER: 2.1 CM
DOP CALC LVOT PEAK VEL: 0.9 M/S
DOP CALC LVOT STROKE VOLUME: 72.7 CM3
DOP CALCLVOT PEAK VEL VTI: 21 CM
E WAVE DECELERATION TIME: 172.04 MSEC
E/A RATIO: 1.32
E/E' RATIO: 12.24 M/S
ECHO LV POSTERIOR WALL: 0.9 CM (ref 0.6–1.1)
FRACTIONAL SHORTENING: 33.3 % (ref 28–44)
INTERVENTRICULAR SEPTUM: 1.1 CM (ref 0.6–1.1)
LEFT ATRIUM AREA SYSTOLIC (APICAL 2 CHAMBER): 17.64 CM2
LEFT ATRIUM AREA SYSTOLIC (APICAL 4 CHAMBER): 20.02 CM2
LEFT ATRIUM SIZE: 3.77 CM
LEFT ATRIUM VOLUME INDEX MOD: 28.8 ML/M2
LEFT ATRIUM VOLUME MOD: 61.08 ML
LEFT INTERNAL DIMENSION IN SYSTOLE: 3 CM (ref 2.1–4)
LEFT VENTRICLE DIASTOLIC VOLUME INDEX: 42.66 ML/M2
LEFT VENTRICLE DIASTOLIC VOLUME: 90.43 ML
LEFT VENTRICLE END SYSTOLIC VOLUME APICAL 2 CHAMBER: 55.16 ML
LEFT VENTRICLE END SYSTOLIC VOLUME APICAL 4 CHAMBER: 60.79 ML
LEFT VENTRICLE MASS INDEX: 72.3 G/M2
LEFT VENTRICLE SYSTOLIC VOLUME INDEX: 16.4 ML/M2
LEFT VENTRICLE SYSTOLIC VOLUME: 34.81 ML
LEFT VENTRICULAR INTERNAL DIMENSION IN DIASTOLE: 4.5 CM (ref 3.5–6)
LEFT VENTRICULAR MASS: 153.3 G
LV LATERAL E/E' RATIO: 10.4 M/S
LV SEPTAL E/E' RATIO: 14.86 M/S
LVED V (TEICH): 90.43 ML
LVES V (TEICH): 34.81 ML
LVOT MG: 1.83 MMHG
LVOT MV: 0.65 CM/S
MV PEAK A VEL: 0.79 M/S
MV PEAK E VEL: 1.04 M/S
MV STENOSIS PRESSURE HALF TIME: 49.89 MS
MV VALVE AREA P 1/2 METHOD: 4.41 CM2
PISA TR MAX VEL: 3.06 M/S
PULM VEIN S/D RATIO: 1.06
PV PEAK D VEL: 0.71 M/S
PV PEAK S VEL: 0.75 M/S
RA PRESSURE ESTIMATED: 3 MMHG
RIGHT VENTRICLE DIASTOLIC LENGTH: 7.2 CM
RIGHT VENTRICLE DIASTOLIC MID DIMENSION: 2.1 CM
RIGHT VENTRICULAR END-DIASTOLIC DIMENSION: 3.23 CM
RIGHT VENTRICULAR LENGTH IN DIASTOLE (APICAL 4-CHAMBER VIEW): 7.15 CM
RV MID DIAMA: 2.05 CM
RV TB RVSP: 6 MMHG
RV TISSUE DOPPLER FREE WALL SYSTOLIC VELOCITY 1 (APICAL 4 CHAMBER VIEW): 14.16 CM/S
SINUS: 2.91 CM
STJ: 3.04 CM
TDI LATERAL: 0.1 M/S
TDI SEPTAL: 0.07 M/S
TDI: 0.09 M/S
TR MAX PG: 37 MMHG
TRICUSPID ANNULAR PLANE SYSTOLIC EXCURSION: 1.98 CM
TV REST PULMONARY ARTERY PRESSURE: 40 MMHG
Z-SCORE OF LEFT VENTRICULAR DIMENSION IN END DIASTOLE: -3.98
Z-SCORE OF LEFT VENTRICULAR DIMENSION IN END SYSTOLE: -2.45

## 2024-11-22 ENCOUNTER — PATIENT MESSAGE (OUTPATIENT)
Dept: FAMILY MEDICINE | Facility: CLINIC | Age: 74
End: 2024-11-22
Payer: MEDICARE

## 2024-11-22 ENCOUNTER — TELEPHONE (OUTPATIENT)
Dept: FAMILY MEDICINE | Facility: CLINIC | Age: 74
End: 2024-11-22
Payer: MEDICARE

## 2024-11-22 NOTE — TELEPHONE ENCOUNTER
Pt states that he need a clearance for hand surgery. Pt doesn't have a surgery scheduled yet, states that he was told to get a clearance first. Pt last saw you in October and never mentioned anything regarding surgery, Pt stated that he thought that you were aware. Pls advise if pt needs another appt, pt already cleared by cardiology.

## 2024-11-22 NOTE — TELEPHONE ENCOUNTER
----- Message from Archana sent at 11/22/2024  3:32 PM CST -----  Contact: Luigi  .Type:  Patient Returning Call    Who Called: Luigi   Who Left Message for Patient: no message left   Does the patient know what this is regarding?: yes   Would the patient rather a call back or a response via [facility]?  Call back   Best Call Back Number: .. [phone]  Additional Information:  pre op clearance     Thanks

## 2024-11-25 NOTE — TELEPHONE ENCOUNTER
Spoke with pt regarding recommendations. Pt has appt scheduled for Wednesday, did you still want to see him?

## 2024-11-26 ENCOUNTER — PATIENT MESSAGE (OUTPATIENT)
Dept: CARDIOLOGY | Facility: CLINIC | Age: 74
End: 2024-11-26
Payer: MEDICARE

## 2024-12-03 ENCOUNTER — TELEPHONE (OUTPATIENT)
Dept: FAMILY MEDICINE | Facility: CLINIC | Age: 74
End: 2024-12-03
Payer: MEDICARE

## 2024-12-03 NOTE — TELEPHONE ENCOUNTER
Spoke with pt, informed office note faxed to Dr. White's office. Pt also informed to let us know when surgery is scheduled, so we can get him set up for xray/labs.

## 2024-12-03 NOTE — TELEPHONE ENCOUNTER
----- Message from Iveth Stafford MD sent at 12/2/2024 12:54 PM CST -----  I added an addendum note for his pre-op clearance. Ok to print this note out and fax to surgical office. I recommend that he get an updated chest xray and cbc/bmp prior to surgery. Please schedule through our office if his surgeon does not already have this scheduled at their office.

## 2024-12-30 DIAGNOSIS — F32.5 DEPRESSION, MAJOR, IN REMISSION: ICD-10-CM

## 2024-12-30 NOTE — TELEPHONE ENCOUNTER
No care due was identified.  [facility] Embedded Care Due Messages. Reference number: [id number].   12/30/2024 2:04:55 PM CST

## 2024-12-31 RX ORDER — SERTRALINE HYDROCHLORIDE 50 MG/1
50 TABLET, FILM COATED ORAL
Qty: 90 TABLET | Refills: 3 | Status: SHIPPED | OUTPATIENT
Start: 2024-12-31

## 2024-12-31 NOTE — TELEPHONE ENCOUNTER
Refill Decision Note   Luigi Chidi  is requesting a refill authorization.  Brief Assessment and Rationale for Refill:  Approve     Medication Therapy Plan:         Comments:     Note composed:9:49 AM 12/31/2024

## 2025-02-22 DIAGNOSIS — Z00.00 ENCOUNTER FOR MEDICARE ANNUAL WELLNESS EXAM: ICD-10-CM

## 2025-03-06 ENCOUNTER — PATIENT MESSAGE (OUTPATIENT)
Dept: ADMINISTRATIVE | Facility: HOSPITAL | Age: 75
End: 2025-03-06
Payer: MEDICARE

## 2025-04-22 ENCOUNTER — PATIENT OUTREACH (OUTPATIENT)
Dept: ADMINISTRATIVE | Facility: HOSPITAL | Age: 75
End: 2025-04-22
Payer: MEDICARE

## 2025-04-22 DIAGNOSIS — N18.31 STAGE 3A CHRONIC KIDNEY DISEASE: Primary | ICD-10-CM

## 2025-04-22 NOTE — PROGRESS NOTES
Working CKD Lab Report.  Pt has lab appt scheduled, 4/28/25.  Micro Albumin urine ordered and linked to appt,

## 2025-05-01 ENCOUNTER — LAB VISIT (OUTPATIENT)
Dept: LAB | Facility: HOSPITAL | Age: 75
End: 2025-05-01
Attending: INTERNAL MEDICINE
Payer: MEDICARE

## 2025-05-01 DIAGNOSIS — N25.81 SECONDARY HYPERPARATHYROIDISM: ICD-10-CM

## 2025-05-01 DIAGNOSIS — N18.31 STAGE 3A CHRONIC KIDNEY DISEASE: ICD-10-CM

## 2025-05-01 DIAGNOSIS — Z79.899 ENCOUNTER FOR LONG-TERM (CURRENT) USE OF HIGH-RISK MEDICATION: ICD-10-CM

## 2025-05-01 LAB
25(OH)D3+25(OH)D2 SERPL-MCNC: 34 NG/ML (ref 30–96)
ABSOLUTE EOSINOPHIL (OHS): 0.16 K/UL
ABSOLUTE MONOCYTE (OHS): 0.33 K/UL (ref 0.3–1)
ABSOLUTE NEUTROPHIL COUNT (OHS): 2.82 K/UL (ref 1.8–7.7)
ALBUMIN SERPL BCP-MCNC: 3.7 G/DL (ref 3.5–5.2)
ANION GAP (OHS): 7 MMOL/L (ref 8–16)
BASOPHILS # BLD AUTO: 0.04 K/UL
BASOPHILS NFR BLD AUTO: 0.7 %
BUN SERPL-MCNC: 13 MG/DL (ref 8–23)
CALCIUM SERPL-MCNC: 8.3 MG/DL (ref 8.7–10.5)
CHLORIDE SERPL-SCNC: 106 MMOL/L (ref 95–110)
CO2 SERPL-SCNC: 27 MMOL/L (ref 23–29)
CREAT SERPL-MCNC: 1.4 MG/DL (ref 0.5–1.4)
ERYTHROCYTE [DISTWIDTH] IN BLOOD BY AUTOMATED COUNT: 13 % (ref 11.5–14.5)
GFR SERPLBLD CREATININE-BSD FMLA CKD-EPI: 53 ML/MIN/1.73/M2
GLUCOSE SERPL-MCNC: 114 MG/DL (ref 70–110)
HCT VFR BLD AUTO: 39.8 % (ref 40–54)
HGB BLD-MCNC: 12.3 GM/DL (ref 14–18)
IMM GRANULOCYTES # BLD AUTO: 0.01 K/UL (ref 0–0.04)
IMM GRANULOCYTES NFR BLD AUTO: 0.2 % (ref 0–0.5)
LYMPHOCYTES # BLD AUTO: 1.98 K/UL (ref 1–4.8)
MCH RBC QN AUTO: 28.6 PG (ref 27–31)
MCHC RBC AUTO-ENTMCNC: 30.9 G/DL (ref 32–36)
MCV RBC AUTO: 93 FL (ref 82–98)
NUCLEATED RBC (/100WBC) (OHS): 0 /100 WBC
PHOSPHATE SERPL-MCNC: 3.2 MG/DL (ref 2.7–4.5)
PLATELET # BLD AUTO: 160 K/UL (ref 150–450)
PMV BLD AUTO: 10.1 FL (ref 9.2–12.9)
POTASSIUM SERPL-SCNC: 4.7 MMOL/L (ref 3.5–5.1)
PTH-INTACT SERPL-MCNC: 216.2 PG/ML (ref 9–77)
RBC # BLD AUTO: 4.3 M/UL (ref 4.6–6.2)
RELATIVE EOSINOPHIL (OHS): 3 %
RELATIVE LYMPHOCYTE (OHS): 37.1 % (ref 18–48)
RELATIVE MONOCYTE (OHS): 6.2 % (ref 4–15)
RELATIVE NEUTROPHIL (OHS): 52.8 % (ref 38–73)
SODIUM SERPL-SCNC: 140 MMOL/L (ref 136–145)
WBC # BLD AUTO: 5.34 K/UL (ref 3.9–12.7)

## 2025-05-01 PROCEDURE — 82570 ASSAY OF URINE CREATININE: CPT

## 2025-05-01 PROCEDURE — 80069 RENAL FUNCTION PANEL: CPT

## 2025-05-01 PROCEDURE — 85025 COMPLETE CBC W/AUTO DIFF WBC: CPT

## 2025-05-01 PROCEDURE — 36415 COLL VENOUS BLD VENIPUNCTURE: CPT | Mod: PO

## 2025-05-01 PROCEDURE — 82306 VITAMIN D 25 HYDROXY: CPT

## 2025-05-01 PROCEDURE — 83970 ASSAY OF PARATHORMONE: CPT

## 2025-05-02 LAB
ALBUMIN/CREAT UR: 6.3 UG/MG
CREAT UR-MCNC: 255 MG/DL (ref 23–375)
MICROALBUMIN UR-MCNC: 16 UG/ML (ref ?–5000)

## 2025-05-08 ENCOUNTER — PATIENT MESSAGE (OUTPATIENT)
Dept: FAMILY MEDICINE | Facility: CLINIC | Age: 75
End: 2025-05-08
Payer: MEDICARE

## 2025-05-08 ENCOUNTER — OFFICE VISIT (OUTPATIENT)
Dept: CARDIOLOGY | Facility: CLINIC | Age: 75
End: 2025-05-08
Payer: MEDICARE

## 2025-05-08 VITALS
HEIGHT: 71 IN | BODY MASS INDEX: 28.24 KG/M2 | SYSTOLIC BLOOD PRESSURE: 179 MMHG | DIASTOLIC BLOOD PRESSURE: 94 MMHG | HEART RATE: 75 BPM | WEIGHT: 201.75 LBS

## 2025-05-08 DIAGNOSIS — N18.31 STAGE 3A CHRONIC KIDNEY DISEASE: ICD-10-CM

## 2025-05-08 DIAGNOSIS — I25.118 CORONARY ARTERY DISEASE OF NATIVE ARTERY OF NATIVE HEART WITH STABLE ANGINA PECTORIS: Primary | ICD-10-CM

## 2025-05-08 DIAGNOSIS — I10 PRIMARY HYPERTENSION: ICD-10-CM

## 2025-05-08 DIAGNOSIS — Z95.5 STATUS POST CORONARY ARTERY STENT PLACEMENT: ICD-10-CM

## 2025-05-08 DIAGNOSIS — N25.81 SECONDARY HYPERPARATHYROIDISM OF RENAL ORIGIN: ICD-10-CM

## 2025-05-08 DIAGNOSIS — E78.5 HYPERLIPIDEMIA WITH TARGET LDL LESS THAN 100: ICD-10-CM

## 2025-05-08 PROCEDURE — 99213 OFFICE O/P EST LOW 20 MIN: CPT | Mod: PBBFAC,PO

## 2025-05-08 PROCEDURE — 99999 PR PBB SHADOW E&M-EST. PATIENT-LVL III: CPT | Mod: PBBFAC,,,

## 2025-05-08 RX ORDER — METOPROLOL SUCCINATE 25 MG/1
25 TABLET, EXTENDED RELEASE ORAL DAILY
Qty: 90 TABLET | Refills: 3 | Status: SHIPPED | OUTPATIENT
Start: 2025-05-08 | End: 2026-05-08

## 2025-05-08 RX ORDER — LISINOPRIL 2.5 MG/1
2.5 TABLET ORAL DAILY
Qty: 90 TABLET | Refills: 3 | Status: SHIPPED | OUTPATIENT
Start: 2025-05-08 | End: 2026-05-08

## 2025-05-08 NOTE — PROGRESS NOTES
[facility]  Date: 5/8/25    Patient: Luigi Murillo, 1950, [id number]  Primary Care Provider: Iveth Stafford MD     Chief Complaint: Follow up     Subjective:       Luigi Murillo is a 74 y.o. male who presents for follow up. They follow with Dr. Barros.    CBC, CMP, lipid panel stable. At last office visit, patient with varying blood pressure on unknown dosage of metoprolol for which medications reconciled and adjusted.    Since then, patient without active cardiac complaints. Notes minimal success with surgery for Dupuytren's contracture- may require further surgical intervention in the near future if no improvement in symptoms. Average SBP 170s at home. Denies chest discomfort, dyspnea, palpitations, dizziness, syncope, orthopnea, PND, edema.    Focused Past History includes:  Coronary artery disease s/p remote stent placement, PTCA 4/2023  Stress echo 10/2020: no ischemia, LVEF 60%, PASP 30  LHC 4/2023: patent mid LAD stent, 99% stenosis of mid ramus intermedius which was successfully angioplastied with 2.0x12 mm balloon, patent stent in prox PDA, 20-30% stenosis in prox 2nd OM, 80-90% tandem stenosis of prox-mid nondominant RCA  TTE 11/2024: LVEF 60-65%, trace MR, trace TR, PASP 40  Hypertension  Hyperlipidemia   Chronic kidney disease     Current Outpatient Medications   Medication Sig    aspirin (ECOTRIN) 81 MG EC tablet Take 81 mg by mouth once daily.    atorvastatin (LIPITOR) 40 MG tablet Take 1 tablet (40 mg total) by mouth once daily.    buPROPion (WELLBUTRIN XL) 150 MG TB24 tablet Take 1 tablet (150 mg total) by mouth once daily.    morphine (MS CONTIN) 30 MG 12 hr tablet Take 30 mg by mouth 3 (three) times daily.    oxyCODONE-acetaminophen (PERCOCET)  mg per tablet     sertraline (ZOLOFT) 50 MG tablet TAKE 1 TABLET DAILY    tamsulosin (FLOMAX) 0.4 mg Cap Take 2 capsules (0.8 mg total) by mouth every evening.    lisinopriL (PRINIVIL,ZESTRIL) 2.5 MG tablet Take  1 tablet (2.5 mg total) by mouth once daily.    metoprolol succinate (TOPROL-XL) 25 MG 24 hr tablet Take 1 tablet (25 mg total) by mouth once daily.     No current facility-administered medications for this visit.            Objective       Review of Systems  Constitutional: negative for fevers, night sweats, and weight loss  Eyes: negative for visual disturbance, diplopia  Respiratory: negative for cough, hemoptysis, sputum, and wheezing  Cardiovascular: see HPI  Gastrointestinal: negative for abdominal pain, bright red blood per rectum, change in bowel habits, dysphagia, melena, and reflux symptoms  Genitourinary:negative for dysuria, frequency, and hematuria  Hematologic/lymphatic: negative for bleeding, easy bruising, and lymphadenopathy  Musculoskeletal:negative for arthralgias, back pain, and myalgias  Neurological: negative for gait problems, paresthesia, speech problems, vertigo, and weakness  Behavioral/Psych: negative for excessive alcohol consumption, illegal drug usage, and sleep disturbance    -------------------------------------    BPH (benign prostatic hypertrophy) with urinary obstruction    CAD (coronary artery disease)    x2    Chronic pain    DDD (degenerative disc disease), lumbar    Depression, major, in remission    Hyperlipidemia LDL goal < 100    Hypertension    MRSA cellulitis    Osteoarthritis    Trigger finger, right ring finger     ----------------------------    Angiogram, coronary, with left heart catheterization    Procedure: Angiogram, Coronary, with Left Heart Cath;  Surgeon: Parth Barros MD;  Location: [facility] CATH;  Service: Cardiology;  Laterality: N/A;    Colonoscopy    Procedure: COLONOSCOPY;  Surgeon: Diann Negrete MD;  Location: [facility] ENDO;  Service: Endoscopy;  Laterality: N/A;    Coronary angioplasty    2 Stents placed a few year ago    Epidural steroid injection    Pain management    Injury    Joint replacement    Knee surgery    Right, x2    Laryngoscopy     "Procedure: LARYNGOSCOPY;  Surgeon: Wandy Dan MD;  Location: [facility] OR;  Service: ENT;  Laterality: Bilateral;    Mandible fracture surgery    accident during  service    Percutaneous coronary intervention, artery    Procedure: Percutaneous coronary intervention;  Surgeon: Parth Barros MD;  Location: [facility];  Service: Cardiology;  Laterality: N/A;    Repair of ligament of shoulder    Rotator cuff repair    right times 2    Vasectomy    Vocal fold lesion excision    Procedure: EXCISION, LESION, VOCAL CORD;  Surgeon: Wandy Dan MD;  Location: [facility] OR;  Service: ENT;  Laterality: Bilateral;        Family History   Problem Relation Name Age of Onset    Heart disease Father Tyshawn Murillo     Vision loss Father Tyshawn Murillo     Emphysema Mother clemente Findsuzi     Cancer Mother clemente Findsuzi         unknown    Heart failure Mother clemente Findsuzi     Arthritis Mother clemente Findsuzi     COPD Mother clemente Findsuzi     Heart disease Mother clemente Raymundo     Prostate cancer Brother      Diabetes Paternal Aunt Michelle Murillo      Social History     Tobacco Use    Smoking status: Former     Current packs/day: 0.00     Average packs/day: 1 pack/day for 25.0 years (25.0 ttl pk-yrs)     Types: Cigarettes     Start date: 1982     Quit date: 2007     Years since quittin.0    Smokeless tobacco: Never   Substance Use Topics    Alcohol use: No    Drug use: Never       Physical Exam  BP (!) 179/94 (Patient Position: Sitting)   Pulse 75   Ht 5' 11" (1.803 m)   Wt 91.5 kg (201 lb 11.5 oz)   BMI 28.13 kg/m²   Body surface area is 2.14 meters squared.  Body mass index is 28.13 kg/m².    General appearance: alert, appears stated age, cooperative, and no distress  Head: Normocephalic, without obvious abnormality, atraumatic  Neck: no carotid bruit, no JVD, and supple, symmetrical, trachea midline  Lungs: clear to auscultation bilaterally  Heart: regular rate and rhythm; S1, S2 normal, no murmur, click, " rub or gallop  Abdomen: soft, non-tender, no distended  Extremities: extremities atraumatic, no pitting edema   Skin: warm, no cyanosis, no pathologic ecchymosis in exposed portions  Neurologic: Grossly normal. A&O x3      Lab Review   Lab Results   Component Value Date    WBC 5.34 05/01/2025    HGB 12.3 (L) 05/01/2025    HCT 39.8 (L) 05/01/2025    MCV 93 05/01/2025     05/01/2025         BMP  Lab Results   Component Value Date     05/01/2025    K 4.7 05/01/2025     05/01/2025    CO2 27 05/01/2025    BUN 13 05/01/2025    CREATININE 1.4 05/01/2025    CALCIUM 8.3 (L) 05/01/2025    ANIONGAP 7 (L) 05/01/2025    ESTGFRAFRICA >60.0 02/26/2021    EGFRNONAA >60.0 02/26/2021       Lab Results   Component Value Date    LABPROT 10.5 06/10/2011    ALBUMIN 3.7 05/01/2025       Lab Results   Component Value Date    ALT 21 01/29/2025    AST 24 01/29/2025    ALKPHOS 99 01/29/2025    BILITOT 0.4 01/29/2025       Lab Results   Component Value Date    TSH 1.572 10/17/2024       Lab Results   Component Value Date    CHOL 184 10/17/2024    CHOL 221 (H) 10/26/2023    CHOL 149 08/09/2022     Lab Results   Component Value Date    HDL 59 10/17/2024    HDL 53 10/26/2023    HDL 45 08/09/2022     Lab Results   Component Value Date    LDLCALC 106.0 10/17/2024    LDLCALC 145.6 10/26/2023    LDLCALC 74.2 08/09/2022     Lab Results   Component Value Date    TRIG 95 10/17/2024    TRIG 112 10/26/2023    TRIG 149 08/09/2022     Lab Results   Component Value Date    CHOLHDL 32.1 10/17/2024    CHOLHDL 24.0 10/26/2023    CHOLHDL 30.2 08/09/2022        EKG 11/5/24: NSR 71 bpm, , QRS 70, no ST changes         Assessment & Plan:     This is a 74 y.o. male with CAD s/p PCI, HTN, HLD, CKD. No active cardiac complaints. Average SBP 170s at home.      1. Coronary artery disease s/p PCI 4/2023  - No anginal equivalents   - Continue ASA 81 mg once weekly   - Continue atorvastatin 40 mg qd   - Continue clopidogrel 75 mg qd     3. Primary  hypertension  - Uncontrolled  - Continue metoprolol succinate 25 mg qd  - Start lisinopril 2.5 mg qd   - Maintain BP log     4. Hyperlipidemia   - , HDL 59  - Continue ASA 81 mg once weekly   - Continue atorvastatin 40 mg qd     5. Stage 3a chronic kidney disease  - Cr stable at 1.4  - Continue routine labs and management per primary care       Emphasized the importance of modifying lifestyle related risk factors including exercise, diet most resembling a Mediterranean diet.    Please follow up in 6 months or sooner if needed.      Carrie Wheeler PA-C  [facility]  Office: [phone]

## 2025-05-19 ENCOUNTER — RESULTS FOLLOW-UP (OUTPATIENT)
Dept: FAMILY MEDICINE | Facility: CLINIC | Age: 75
End: 2025-05-19

## 2025-05-19 NOTE — TELEPHONE ENCOUNTER
Recent labs are stable except that PTH is higher. I think this could be related to decreased kidney function. Kidney function still showing a decline but at least stable compared to the last blood work, no worsening. I am placing a referral to nephrology for further evaluation.     I have signed for the following orders AND/OR meds.  Please call the patient and ask the patient to schedule the testing AND/OR inform about any medications that were sent. Medications have been sent to pharmacy listed below      Orders Placed This Encounter   Procedures    Ambulatory referral/consult to Nephrology     Standing Status:   Future     Expected Date:   5/26/2025     Expiration Date:   6/19/2026     Referral Priority:   Routine     Referral Type:   Consultation     Referral Reason:   Specialty Services Required     Requested Specialty:   Nephrology     Number of Visits Requested:   1              [facility] - [address] [address]  Phone: [phone] Fax: [phone]    [facility] - [place], [place] - [address] [facility]  [address] [facility] [address]  Phone: [phone] Fax: [phone]

## 2025-05-22 ENCOUNTER — TELEPHONE (OUTPATIENT)
Dept: NEPHROLOGY | Facility: CLINIC | Age: 75
End: 2025-05-22
Payer: MEDICARE

## 2025-05-22 NOTE — TELEPHONE ENCOUNTER
----- Message from Linda sent at 5/22/2025  4:20 PM CDT -----  Contact: Patient  Type:  Patient Returning CallWho Called:PatientWho Left Message for Patient:UnknownDoes the patient know what this is regarding?:YesWould the patient rather a call back or a response via [facility]? CallBe Call Back Number:[phone] Information: Please call to advise

## 2025-06-10 ENCOUNTER — PATIENT MESSAGE (OUTPATIENT)
Dept: FAMILY MEDICINE | Facility: CLINIC | Age: 75
End: 2025-06-10
Payer: MEDICARE

## 2025-07-29 ENCOUNTER — PATIENT MESSAGE (OUTPATIENT)
Dept: ADMINISTRATIVE | Facility: HOSPITAL | Age: 75
End: 2025-07-29
Payer: MEDICARE

## 2025-08-07 ENCOUNTER — PATIENT MESSAGE (OUTPATIENT)
Dept: FAMILY MEDICINE | Facility: CLINIC | Age: 75
End: 2025-08-07
Payer: MEDICARE

## 2025-08-07 ENCOUNTER — TELEPHONE (OUTPATIENT)
Dept: CARDIOLOGY | Facility: CLINIC | Age: 75
End: 2025-08-07
Payer: MEDICARE

## 2025-08-07 NOTE — TELEPHONE ENCOUNTER
[facility]  RT SF Ray amputation / MP + Palm needle aponeurotomy  Anesthesia : [facility]    Aspirin   Phone # [phone]  Fax # [phone]

## 2025-08-11 ENCOUNTER — OFFICE VISIT (OUTPATIENT)
Dept: FAMILY MEDICINE | Facility: CLINIC | Age: 75
End: 2025-08-11
Payer: MEDICARE

## 2025-08-11 ENCOUNTER — HOSPITAL ENCOUNTER (OUTPATIENT)
Dept: RADIOLOGY | Facility: HOSPITAL | Age: 75
Discharge: HOME | End: 2025-08-11
Attending: PHYSICIAN ASSISTANT
Payer: MEDICARE

## 2025-08-11 VITALS
WEIGHT: 193.63 LBS | BODY MASS INDEX: 27.11 KG/M2 | HEART RATE: 92 BPM | SYSTOLIC BLOOD PRESSURE: 148 MMHG | RESPIRATION RATE: 16 BRPM | OXYGEN SATURATION: 98 % | DIASTOLIC BLOOD PRESSURE: 84 MMHG | HEIGHT: 71 IN

## 2025-08-11 DIAGNOSIS — Z01.818 PREOPERATIVE CLEARANCE: Primary | ICD-10-CM

## 2025-08-11 DIAGNOSIS — I25.118 CORONARY ARTERY DISEASE OF NATIVE ARTERY OF NATIVE HEART WITH STABLE ANGINA PECTORIS: ICD-10-CM

## 2025-08-11 DIAGNOSIS — Z01.818 PREOPERATIVE CLEARANCE: ICD-10-CM

## 2025-08-11 DIAGNOSIS — M51.360 DEGENERATION OF INTERVERTEBRAL DISC OF LUMBAR REGION WITH DISCOGENIC BACK PAIN: ICD-10-CM

## 2025-08-11 DIAGNOSIS — I10 PRIMARY HYPERTENSION: ICD-10-CM

## 2025-08-11 DIAGNOSIS — N18.31 STAGE 3A CHRONIC KIDNEY DISEASE: ICD-10-CM

## 2025-08-11 DIAGNOSIS — E78.5 HYPERLIPIDEMIA WITH TARGET LDL LESS THAN 100: ICD-10-CM

## 2025-08-11 PROCEDURE — G2211 COMPLEX E/M VISIT ADD ON: HCPCS | Mod: ,,, | Performed by: PHYSICIAN ASSISTANT

## 2025-08-11 PROCEDURE — 93005 ELECTROCARDIOGRAM TRACING: CPT | Mod: PBBFAC,PO | Performed by: INTERNAL MEDICINE

## 2025-08-11 PROCEDURE — 99214 OFFICE O/P EST MOD 30 MIN: CPT | Mod: PBBFAC,25,PO | Performed by: PHYSICIAN ASSISTANT

## 2025-08-11 PROCEDURE — 71046 X-RAY EXAM CHEST 2 VIEWS: CPT | Mod: 26,,, | Performed by: RADIOLOGY

## 2025-08-11 PROCEDURE — 71046 X-RAY EXAM CHEST 2 VIEWS: CPT | Mod: TC,PO

## 2025-08-11 PROCEDURE — 93010 ELECTROCARDIOGRAM REPORT: CPT | Mod: S$PBB,,, | Performed by: INTERNAL MEDICINE

## 2025-08-11 PROCEDURE — 99999 PR PBB SHADOW E&M-EST. PATIENT-LVL IV: CPT | Mod: PBBFAC,,, | Performed by: PHYSICIAN ASSISTANT

## 2025-08-11 PROCEDURE — 99214 OFFICE O/P EST MOD 30 MIN: CPT | Mod: S$PBB,,, | Performed by: PHYSICIAN ASSISTANT

## 2025-08-11 RX ORDER — LISINOPRIL 2.5 MG/1
2.5 TABLET ORAL DAILY
Qty: 90 TABLET | Refills: 3 | Status: SHIPPED | OUTPATIENT
Start: 2025-08-11 | End: 2026-08-11

## 2025-08-11 RX ORDER — METOPROLOL SUCCINATE 25 MG/1
25 TABLET, EXTENDED RELEASE ORAL DAILY
Qty: 90 TABLET | Refills: 3 | Status: SHIPPED | OUTPATIENT
Start: 2025-08-11 | End: 2026-08-11

## 2025-08-12 ENCOUNTER — TELEPHONE (OUTPATIENT)
Dept: FAMILY MEDICINE | Facility: CLINIC | Age: 75
End: 2025-08-12
Payer: MEDICARE

## 2025-08-12 LAB
OHS QRS DURATION: 70 MS
OHS QTC CALCULATION: 430 MS

## 2025-08-19 ENCOUNTER — TELEPHONE (OUTPATIENT)
Dept: FAMILY MEDICINE | Facility: CLINIC | Age: 75
End: 2025-08-19
Payer: MEDICARE

## 2025-08-20 ENCOUNTER — TELEPHONE (OUTPATIENT)
Dept: PRIMARY CARE CLINIC | Facility: CLINIC | Age: 75
End: 2025-08-20
Payer: MEDICARE

## 2025-08-29 ENCOUNTER — PATIENT MESSAGE (OUTPATIENT)
Dept: ADMINISTRATIVE | Facility: HOSPITAL | Age: 75
End: 2025-08-29
Payer: MEDICARE

## (undated) DEVICE — SEE MEDLINE ITEM 157131

## (undated) DEVICE — APPLICATOR CHLORAPREP ORN 26ML

## (undated) DEVICE — GLOVE BIOGEL PI ORTHO PRO SZ7

## (undated) DEVICE — GLOVE SURGICAL LATEX SZ 6.5

## (undated) DEVICE — DRESSING SURGICAL 1/2X1/2

## (undated) DEVICE — STOCKINETTE TUBULAR 2PL 6 X 4

## (undated) DEVICE — SEE MEDLINE ITEM 146308

## (undated) DEVICE — Device

## (undated) DEVICE — SUT 4-0 ETHILON 18 PS-2

## (undated) DEVICE — SEE MEDLINE ITEM 152522

## (undated) DEVICE — GLOVE SURGICAL LATEX SZ 7

## (undated) DEVICE — SEE MEDLINE ITEM 157173

## (undated) DEVICE — TUBING SUC UNIV W/CONN 12FT

## (undated) DEVICE — SUPPORT ULNA NERVE PROTECTOR

## (undated) DEVICE — PAD UNDERPAD 30X30

## (undated) DEVICE — DRAPE HALF SURGICAL 40X58IN

## (undated) DEVICE — COVER OVERHEAD SURG LT BLUE

## (undated) DEVICE — TOURNIQUET SB QC DP 18X4IN

## (undated) DEVICE — DRESSING XEROFORM FOIL PK 1X8

## (undated) DEVICE — PAD CAST SPECIALIST STRL 3

## (undated) DEVICE — TOWEL OR DISP STRL BLUE 4/PK

## (undated) DEVICE — SOL 9P NACL IRR PIC IL

## (undated) DEVICE — COVER PROXIMA MAYO STAND

## (undated) DEVICE — SEE MEDLINE ITEM 157027

## (undated) DEVICE — SEE MEDLINE ITEM 157117

## (undated) DEVICE — DRESSING TELFA STRL 4X3 LF

## (undated) DEVICE — SYR 10CC LUER LOCK

## (undated) DEVICE — KIT ANTIFOG

## (undated) DEVICE — PAD CAST 2 IN X 4YDS STERILE

## (undated) DEVICE — CAUTERY TIP 2 3/4

## (undated) DEVICE — GAUZE SPONGE 4X4 12PLY

## (undated) DEVICE — BANDAGE ELASTIC 2X5 VELCRO ST

## (undated) DEVICE — NDL SAFETY 25G X 1.5 ECLIPSE

## (undated) DEVICE — SOL 0.9% NACL IRRI.IN STERIL

## (undated) DEVICE — ELECTRODE REM PLYHSV RETURN 9

## (undated) DEVICE — BLADE SURG #15 CARBON STEEL

## (undated) DEVICE — MANIFOLD 4 PORT

## (undated) DEVICE — POSITIONER HEAD DONUT 9IN FOAM